# Patient Record
Sex: FEMALE | Race: ASIAN | NOT HISPANIC OR LATINO | URBAN - METROPOLITAN AREA
[De-identification: names, ages, dates, MRNs, and addresses within clinical notes are randomized per-mention and may not be internally consistent; named-entity substitution may affect disease eponyms.]

---

## 2018-03-01 ENCOUNTER — OUTPATIENT (OUTPATIENT)
Dept: OUTPATIENT SERVICES | Facility: HOSPITAL | Age: 83
LOS: 1 days | End: 2018-03-01
Payer: MEDICAID

## 2018-03-01 DIAGNOSIS — Z90.89 ACQUIRED ABSENCE OF OTHER ORGANS: Chronic | ICD-10-CM

## 2018-03-01 PROCEDURE — G9001: CPT

## 2018-03-09 ENCOUNTER — INPATIENT (INPATIENT)
Facility: HOSPITAL | Age: 83
LOS: 7 days | Discharge: HOME | DRG: 689 | End: 2018-03-17
Attending: HOSPITALIST | Admitting: SURGERY
Payer: MEDICARE

## 2018-03-09 VITALS
HEIGHT: 58 IN | SYSTOLIC BLOOD PRESSURE: 198 MMHG | HEART RATE: 87 BPM | OXYGEN SATURATION: 98 % | TEMPERATURE: 101 F | RESPIRATION RATE: 18 BRPM | DIASTOLIC BLOOD PRESSURE: 70 MMHG | WEIGHT: 134.92 LBS

## 2018-03-09 DIAGNOSIS — Z90.89 ACQUIRED ABSENCE OF OTHER ORGANS: Chronic | ICD-10-CM

## 2018-03-09 LAB
ALBUMIN SERPL ELPH-MCNC: 3.7 G/DL — SIGNIFICANT CHANGE UP (ref 3.3–5.2)
ALP SERPL-CCNC: 111 U/L — SIGNIFICANT CHANGE UP (ref 40–120)
ALT FLD-CCNC: 11 U/L — SIGNIFICANT CHANGE UP
ANION GAP SERPL CALC-SCNC: 13 MMOL/L — SIGNIFICANT CHANGE UP (ref 5–17)
APPEARANCE UR: CLEAR — SIGNIFICANT CHANGE UP
APTT BLD: 34.2 SEC — SIGNIFICANT CHANGE UP (ref 27.5–37.4)
AST SERPL-CCNC: 21 U/L — SIGNIFICANT CHANGE UP
BACTERIA # UR AUTO: ABNORMAL
BASOPHILS # BLD AUTO: 0 K/UL — SIGNIFICANT CHANGE UP (ref 0–0.2)
BASOPHILS NFR BLD AUTO: 0.5 % — SIGNIFICANT CHANGE UP (ref 0–2)
BILIRUB SERPL-MCNC: 0.2 MG/DL — LOW (ref 0.4–2)
BILIRUB UR-MCNC: NEGATIVE — SIGNIFICANT CHANGE UP
BUN SERPL-MCNC: 25 MG/DL — HIGH (ref 8–20)
CALCIUM SERPL-MCNC: 9.3 MG/DL — SIGNIFICANT CHANGE UP (ref 8.6–10.2)
CHLORIDE SERPL-SCNC: 100 MMOL/L — SIGNIFICANT CHANGE UP (ref 98–107)
CO2 SERPL-SCNC: 28 MMOL/L — SIGNIFICANT CHANGE UP (ref 22–29)
COLOR SPEC: YELLOW — SIGNIFICANT CHANGE UP
CREAT SERPL-MCNC: 1 MG/DL — SIGNIFICANT CHANGE UP (ref 0.5–1.3)
DIFF PNL FLD: ABNORMAL
EOSINOPHIL # BLD AUTO: 0 K/UL — SIGNIFICANT CHANGE UP (ref 0–0.5)
EOSINOPHIL NFR BLD AUTO: 0.6 % — SIGNIFICANT CHANGE UP (ref 0–6)
EPI CELLS # UR: ABNORMAL
GLUCOSE SERPL-MCNC: 171 MG/DL — HIGH (ref 70–115)
GLUCOSE UR QL: NEGATIVE MG/DL — SIGNIFICANT CHANGE UP
HCT VFR BLD CALC: 36 % — LOW (ref 37–47)
HGB BLD-MCNC: 11.4 G/DL — LOW (ref 12–16)
INR BLD: 1.03 RATIO — SIGNIFICANT CHANGE UP (ref 0.88–1.16)
KETONES UR-MCNC: NEGATIVE — SIGNIFICANT CHANGE UP
LEUKOCYTE ESTERASE UR-ACNC: ABNORMAL
LYMPHOCYTES # BLD AUTO: 1.2 K/UL — SIGNIFICANT CHANGE UP (ref 1–4.8)
LYMPHOCYTES # BLD AUTO: 19.7 % — LOW (ref 20–55)
MCHC RBC-ENTMCNC: 27.1 PG — SIGNIFICANT CHANGE UP (ref 27–31)
MCHC RBC-ENTMCNC: 31.7 G/DL — LOW (ref 32–36)
MCV RBC AUTO: 85.7 FL — SIGNIFICANT CHANGE UP (ref 81–99)
MONOCYTES # BLD AUTO: 0.7 K/UL — SIGNIFICANT CHANGE UP (ref 0–0.8)
MONOCYTES NFR BLD AUTO: 10.6 % — HIGH (ref 3–10)
NEUTROPHILS # BLD AUTO: 4.3 K/UL — SIGNIFICANT CHANGE UP (ref 1.8–8)
NEUTROPHILS NFR BLD AUTO: 68.6 % — SIGNIFICANT CHANGE UP (ref 37–73)
NITRITE UR-MCNC: POSITIVE
PH UR: 6 — SIGNIFICANT CHANGE UP (ref 5–8)
PLATELET # BLD AUTO: 274 K/UL — SIGNIFICANT CHANGE UP (ref 150–400)
POTASSIUM SERPL-MCNC: 4.7 MMOL/L — SIGNIFICANT CHANGE UP (ref 3.5–5.3)
POTASSIUM SERPL-SCNC: 4.7 MMOL/L — SIGNIFICANT CHANGE UP (ref 3.5–5.3)
PROT SERPL-MCNC: 7.1 G/DL — SIGNIFICANT CHANGE UP (ref 6.6–8.7)
PROT UR-MCNC: 100 MG/DL
PROTHROM AB SERPL-ACNC: 11.3 SEC — SIGNIFICANT CHANGE UP (ref 9.8–12.7)
RBC # BLD: 4.2 M/UL — LOW (ref 4.4–5.2)
RBC # FLD: 13.3 % — SIGNIFICANT CHANGE UP (ref 11–15.6)
RBC CASTS # UR COMP ASSIST: ABNORMAL /HPF (ref 0–4)
SODIUM SERPL-SCNC: 141 MMOL/L — SIGNIFICANT CHANGE UP (ref 135–145)
SP GR SPEC: 1.02 — SIGNIFICANT CHANGE UP (ref 1.01–1.02)
UROBILINOGEN FLD QL: NEGATIVE MG/DL — SIGNIFICANT CHANGE UP
WBC # BLD: 6.3 K/UL — SIGNIFICANT CHANGE UP (ref 4.8–10.8)
WBC # FLD AUTO: 6.3 K/UL — SIGNIFICANT CHANGE UP (ref 4.8–10.8)
WBC UR QL: ABNORMAL

## 2018-03-09 PROCEDURE — 93010 ELECTROCARDIOGRAM REPORT: CPT

## 2018-03-09 PROCEDURE — 71045 X-RAY EXAM CHEST 1 VIEW: CPT | Mod: 26

## 2018-03-09 PROCEDURE — 99285 EMERGENCY DEPT VISIT HI MDM: CPT

## 2018-03-09 PROCEDURE — 70450 CT HEAD/BRAIN W/O DYE: CPT | Mod: 26

## 2018-03-09 RX ORDER — SODIUM CHLORIDE 9 MG/ML
3 INJECTION INTRAMUSCULAR; INTRAVENOUS; SUBCUTANEOUS ONCE
Qty: 0 | Refills: 0 | Status: COMPLETED | OUTPATIENT
Start: 2018-03-09 | End: 2018-03-09

## 2018-03-09 RX ORDER — CEFTRIAXONE 500 MG/1
1 INJECTION, POWDER, FOR SOLUTION INTRAMUSCULAR; INTRAVENOUS ONCE
Qty: 0 | Refills: 0 | Status: DISCONTINUED | OUTPATIENT
Start: 2018-03-09 | End: 2018-03-09

## 2018-03-09 RX ORDER — VANCOMYCIN HCL 1 G
1000 VIAL (EA) INTRAVENOUS ONCE
Qty: 0 | Refills: 0 | Status: COMPLETED | OUTPATIENT
Start: 2018-03-09 | End: 2018-03-09

## 2018-03-09 RX ORDER — PIPERACILLIN AND TAZOBACTAM 4; .5 G/20ML; G/20ML
3.38 INJECTION, POWDER, LYOPHILIZED, FOR SOLUTION INTRAVENOUS ONCE
Qty: 0 | Refills: 0 | Status: COMPLETED | OUTPATIENT
Start: 2018-03-09 | End: 2018-03-09

## 2018-03-09 RX ORDER — LABETALOL HCL 100 MG
10 TABLET ORAL ONCE
Qty: 0 | Refills: 0 | Status: COMPLETED | OUTPATIENT
Start: 2018-03-09 | End: 2018-03-09

## 2018-03-09 RX ADMIN — PIPERACILLIN AND TAZOBACTAM 200 GRAM(S): 4; .5 INJECTION, POWDER, LYOPHILIZED, FOR SOLUTION INTRAVENOUS at 17:00

## 2018-03-09 RX ADMIN — SODIUM CHLORIDE 3 MILLILITER(S): 9 INJECTION INTRAMUSCULAR; INTRAVENOUS; SUBCUTANEOUS at 17:06

## 2018-03-09 RX ADMIN — Medication 250 MILLIGRAM(S): at 17:00

## 2018-03-09 RX ADMIN — Medication 1 MILLIGRAM(S): at 22:03

## 2018-03-09 NOTE — ED PROVIDER NOTE - OBJECTIVE STATEMENT
AS PER EMS CAME FROM DIALYSIS WITH AMS AS PER TRIAGE. PT NOT VERBAL UPON ARRIVAL AND NO FAMILY IS WITH HER. PT FOUND TO HAVE FEVER AND CODE SEPSIS CALLED   MED HXAtrial fibrillation    CVA (cerebral vascular accident)    Diabetes    High cholesterol    Hypertension

## 2018-03-09 NOTE — ED ADULT NURSE REASSESSMENT NOTE - NS ED NURSE REASSESS COMMENT FT1
Pt. received at 1930, awake, nonverbal, moves all extremities but does not follow commands. Family at bedside, states pt. does not receive Dialysis as EMS reported. Son Jack ([phone]) at bedside to provide Hx. Pt was home and not acting self, states pt. is normally A&Ox3 at baseline and walks without help. as per family pt. is altered, pt. pending CT of head.

## 2018-03-09 NOTE — ED ADULT NURSE NOTE - PMH
Atrial fibrillation    CVA (cerebral vascular accident)    Diabetes    High cholesterol    Hypertension

## 2018-03-09 NOTE — ED PROVIDER NOTE - SHIFT CHANGE DETAILS
PT WITH AMS AND FEVER RECTAL 101.3 IN RESUS ALTHOUGH NOT DOCUMENTED. EVAL SIG UTI  AWAITING CT RESULT FOR ADMIT  RECVD AB  DR WOODARD PCP

## 2018-03-09 NOTE — ED ADULT NURSE NOTE - OBJECTIVE STATEMENT
Patient found laying in stretcher, awake, alert, altered mental status, breathing unlabored.  Patient sent from dialysis due to increased altered mental status.  responsive to pain.

## 2018-03-09 NOTE — ED PROVIDER NOTE - PROGRESS NOTE DETAILS
surgery informed me that patient is to be admitted to SICU under Dr. Cunningham for subdural hematoma. Also confirmed with surgery that they know about the UTI; surgery will treat UTI.

## 2018-03-09 NOTE — ED PROVIDER NOTE - ENMT, MLM
Airway patent,  . Mouth with normal mucosa. Throat has no vesicles, no oropharyngeal exudates and uvula is midline.

## 2018-03-09 NOTE — ED ADULT TRIAGE NOTE - CHIEF COMPLAINT QUOTE
BIBA, patient is awake and not providing any hx, as per ems, patient was sent for eval of ams, patient has a hx of ams post dialysis, no family present for further hx

## 2018-03-10 DIAGNOSIS — N30.00 ACUTE CYSTITIS WITHOUT HEMATURIA: ICD-10-CM

## 2018-03-10 DIAGNOSIS — I10 ESSENTIAL (PRIMARY) HYPERTENSION: ICD-10-CM

## 2018-03-10 DIAGNOSIS — I63.513 CEREBRAL INFARCTION DUE TO UNSPECIFIED OCCLUSION OR STENOSIS OF BILATERAL MIDDLE CEREBRAL ARTERIES: ICD-10-CM

## 2018-03-10 DIAGNOSIS — E44.0 MODERATE PROTEIN-CALORIE MALNUTRITION: ICD-10-CM

## 2018-03-10 DIAGNOSIS — I62.00 NONTRAUMATIC SUBDURAL HEMORRHAGE, UNSPECIFIED: ICD-10-CM

## 2018-03-10 DIAGNOSIS — E11.9 TYPE 2 DIABETES MELLITUS WITHOUT COMPLICATIONS: ICD-10-CM

## 2018-03-10 DIAGNOSIS — N39.0 URINARY TRACT INFECTION, SITE NOT SPECIFIED: ICD-10-CM

## 2018-03-10 LAB
ANION GAP SERPL CALC-SCNC: 10 MMOL/L — SIGNIFICANT CHANGE UP (ref 5–17)
BASOPHILS # BLD AUTO: 0 K/UL — SIGNIFICANT CHANGE UP (ref 0–0.2)
BASOPHILS NFR BLD AUTO: 0.6 % — SIGNIFICANT CHANGE UP (ref 0–2)
BUN SERPL-MCNC: 21 MG/DL — HIGH (ref 8–20)
CALCIUM SERPL-MCNC: 8.7 MG/DL — SIGNIFICANT CHANGE UP (ref 8.6–10.2)
CHLORIDE SERPL-SCNC: 100 MMOL/L — SIGNIFICANT CHANGE UP (ref 98–107)
CO2 SERPL-SCNC: 29 MMOL/L — SIGNIFICANT CHANGE UP (ref 22–29)
CREAT SERPL-MCNC: 0.91 MG/DL — SIGNIFICANT CHANGE UP (ref 0.5–1.3)
EOSINOPHIL # BLD AUTO: 0.1 K/UL — SIGNIFICANT CHANGE UP (ref 0–0.5)
EOSINOPHIL NFR BLD AUTO: 1.9 % — SIGNIFICANT CHANGE UP (ref 0–6)
GLUCOSE BLDC GLUCOMTR-MCNC: 75 MG/DL — SIGNIFICANT CHANGE UP (ref 70–99)
GLUCOSE BLDC GLUCOMTR-MCNC: 84 MG/DL — SIGNIFICANT CHANGE UP (ref 70–99)
GLUCOSE BLDC GLUCOMTR-MCNC: 86 MG/DL — SIGNIFICANT CHANGE UP (ref 70–99)
GLUCOSE BLDC GLUCOMTR-MCNC: 96 MG/DL — SIGNIFICANT CHANGE UP (ref 70–99)
GLUCOSE BLDC GLUCOMTR-MCNC: 97 MG/DL — SIGNIFICANT CHANGE UP (ref 70–99)
GLUCOSE SERPL-MCNC: 86 MG/DL — SIGNIFICANT CHANGE UP (ref 70–115)
HBA1C BLD-MCNC: 5.7 % — HIGH (ref 4–5.6)
HCT VFR BLD CALC: 34 % — LOW (ref 37–47)
HGB BLD-MCNC: 11 G/DL — LOW (ref 12–16)
LYMPHOCYTES # BLD AUTO: 1.5 K/UL — SIGNIFICANT CHANGE UP (ref 1–4.8)
LYMPHOCYTES # BLD AUTO: 29.1 % — SIGNIFICANT CHANGE UP (ref 20–55)
MAGNESIUM SERPL-MCNC: 2 MG/DL — SIGNIFICANT CHANGE UP (ref 1.6–2.6)
MCHC RBC-ENTMCNC: 27 PG — SIGNIFICANT CHANGE UP (ref 27–31)
MCHC RBC-ENTMCNC: 32.4 G/DL — SIGNIFICANT CHANGE UP (ref 32–36)
MCV RBC AUTO: 83.3 FL — SIGNIFICANT CHANGE UP (ref 81–99)
MONOCYTES # BLD AUTO: 0.6 K/UL — SIGNIFICANT CHANGE UP (ref 0–0.8)
MONOCYTES NFR BLD AUTO: 10.9 % — HIGH (ref 3–10)
NEUTROPHILS # BLD AUTO: 3 K/UL — SIGNIFICANT CHANGE UP (ref 1.8–8)
NEUTROPHILS NFR BLD AUTO: 57.1 % — SIGNIFICANT CHANGE UP (ref 37–73)
PHOSPHATE SERPL-MCNC: 3.5 MG/DL — SIGNIFICANT CHANGE UP (ref 2.4–4.7)
PLATELET # BLD AUTO: 248 K/UL — SIGNIFICANT CHANGE UP (ref 150–400)
POTASSIUM SERPL-MCNC: 4.3 MMOL/L — SIGNIFICANT CHANGE UP (ref 3.5–5.3)
POTASSIUM SERPL-SCNC: 4.3 MMOL/L — SIGNIFICANT CHANGE UP (ref 3.5–5.3)
RBC # BLD: 4.08 M/UL — LOW (ref 4.4–5.2)
RBC # FLD: 13.2 % — SIGNIFICANT CHANGE UP (ref 11–15.6)
SODIUM SERPL-SCNC: 139 MMOL/L — SIGNIFICANT CHANGE UP (ref 135–145)
T3 SERPL-MCNC: 82 NG/DL — SIGNIFICANT CHANGE UP (ref 80–200)
T4 AB SER-ACNC: 6.2 UG/DL — SIGNIFICANT CHANGE UP (ref 4.5–12)
TSH SERPL-MCNC: 3 UIU/ML — SIGNIFICANT CHANGE UP (ref 0.27–4.2)
WBC # BLD: 5.2 K/UL — SIGNIFICANT CHANGE UP (ref 4.8–10.8)
WBC # FLD AUTO: 5.2 K/UL — SIGNIFICANT CHANGE UP (ref 4.8–10.8)

## 2018-03-10 PROCEDURE — 99291 CRITICAL CARE FIRST HOUR: CPT

## 2018-03-10 PROCEDURE — 70450 CT HEAD/BRAIN W/O DYE: CPT | Mod: 26

## 2018-03-10 PROCEDURE — 99222 1ST HOSP IP/OBS MODERATE 55: CPT | Mod: AI

## 2018-03-10 RX ORDER — ACETAMINOPHEN 500 MG
1000 TABLET ORAL ONCE
Qty: 0 | Refills: 0 | Status: COMPLETED | OUTPATIENT
Start: 2018-03-10 | End: 2018-03-10

## 2018-03-10 RX ORDER — PIPERACILLIN AND TAZOBACTAM 4; .5 G/20ML; G/20ML
3.38 INJECTION, POWDER, LYOPHILIZED, FOR SOLUTION INTRAVENOUS EVERY 8 HOURS
Qty: 0 | Refills: 0 | Status: DISCONTINUED | OUTPATIENT
Start: 2018-03-10 | End: 2018-03-11

## 2018-03-10 RX ORDER — SODIUM CHLORIDE 9 MG/ML
1000 INJECTION, SOLUTION INTRAVENOUS
Qty: 0 | Refills: 0 | Status: DISCONTINUED | OUTPATIENT
Start: 2018-03-10 | End: 2018-03-16

## 2018-03-10 RX ORDER — SODIUM CHLORIDE 9 MG/ML
1000 INJECTION INTRAMUSCULAR; INTRAVENOUS; SUBCUTANEOUS
Qty: 0 | Refills: 0 | Status: DISCONTINUED | OUTPATIENT
Start: 2018-03-10 | End: 2018-03-10

## 2018-03-10 RX ORDER — HYDRALAZINE HCL 50 MG
5 TABLET ORAL ONCE
Qty: 0 | Refills: 0 | Status: COMPLETED | OUTPATIENT
Start: 2018-03-10 | End: 2018-03-10

## 2018-03-10 RX ORDER — PIPERACILLIN AND TAZOBACTAM 4; .5 G/20ML; G/20ML
3.38 INJECTION, POWDER, LYOPHILIZED, FOR SOLUTION INTRAVENOUS ONCE
Qty: 0 | Refills: 0 | Status: COMPLETED | OUTPATIENT
Start: 2018-03-10 | End: 2018-03-10

## 2018-03-10 RX ORDER — ONDANSETRON 8 MG/1
4 TABLET, FILM COATED ORAL EVERY 6 HOURS
Qty: 0 | Refills: 0 | Status: DISCONTINUED | OUTPATIENT
Start: 2018-03-10 | End: 2018-03-17

## 2018-03-10 RX ORDER — INSULIN LISPRO 100/ML
VIAL (ML) SUBCUTANEOUS EVERY 4 HOURS
Qty: 0 | Refills: 0 | Status: DISCONTINUED | OUTPATIENT
Start: 2018-03-10 | End: 2018-03-11

## 2018-03-10 RX ADMIN — PIPERACILLIN AND TAZOBACTAM 200 GRAM(S): 4; .5 INJECTION, POWDER, LYOPHILIZED, FOR SOLUTION INTRAVENOUS at 11:31

## 2018-03-10 RX ADMIN — SODIUM CHLORIDE 75 MILLILITER(S): 9 INJECTION, SOLUTION INTRAVENOUS at 11:31

## 2018-03-10 RX ADMIN — SODIUM CHLORIDE 75 MILLILITER(S): 9 INJECTION INTRAMUSCULAR; INTRAVENOUS; SUBCUTANEOUS at 06:07

## 2018-03-10 RX ADMIN — PIPERACILLIN AND TAZOBACTAM 25 GRAM(S): 4; .5 INJECTION, POWDER, LYOPHILIZED, FOR SOLUTION INTRAVENOUS at 18:08

## 2018-03-10 RX ADMIN — Medication 1.25 MILLIGRAM(S): at 23:22

## 2018-03-10 RX ADMIN — Medication 1.25 MILLIGRAM(S): at 06:07

## 2018-03-10 RX ADMIN — Medication 400 MILLIGRAM(S): at 14:50

## 2018-03-10 RX ADMIN — Medication 1.25 MILLIGRAM(S): at 18:08

## 2018-03-10 RX ADMIN — Medication 1000 MILLIGRAM(S): at 15:00

## 2018-03-10 RX ADMIN — Medication 5 MILLIGRAM(S): at 23:34

## 2018-03-10 RX ADMIN — Medication 1.25 MILLIGRAM(S): at 12:53

## 2018-03-10 NOTE — H&P ADULT - NEUROLOGICAL COMMENTS
Non-verbal, No facial droop noted. Opens eyes to pain. Mumbles a few inappropriate words. Not able to follow simple commands. Moves all extremities spontaneously, Localizes to pain in all extremities.

## 2018-03-10 NOTE — H&P ADULT - HISTORY OF PRESENT ILLNESS
88F with PMH Afib, CVA, DM, benign heart murmur presents to ED with "Altered mental status" and "fever". Per her son, the patient became "non-verbal this AM". Per son, the patient does have dementia.  She is ambulatory with assistance.  She can usually speak and say her name/.  She was not able to do that this morning, and that was when her family became concerned.  Per the son, the patient also fell and hit her head about 2 weeks ago. Since her fall she had been in her usual state of health until today. Her appetite is unchanged. No nausea or vomiting as per son. She had been complaining of headache since her fall.  They did not seek any medical attention for the fall. The patient is prone to urinary tract infections. Pt currently takes ASA 81mg and last dose was yesterday 3/8/18.      Patient is lying comfortably in bed. She appears to be lethargic and non-verbal. Able to open her eyes to pain, but unable to sustain eye opening for more than a few seconds. Not able to follow simple commands. AOx0. She is moving all extremities spontaneously. Localizes to pain in all extremities.

## 2018-03-10 NOTE — CONSULT NOTE ADULT - ATTENDING COMMENTS
NSGY Attg:    see above  CT reviewed  unlikely for bilateral subdural collections to account for acute altered MS  will follow  supportive care per ICU

## 2018-03-10 NOTE — H&P ADULT - ASSESSMENT
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

## 2018-03-10 NOTE — ED ADULT NURSE REASSESSMENT NOTE - NS ED NURSE REASSESS COMMENT FT1
SICU informed of pt. bed, questioned about pt. BP. ED MD Acuña states pt. does not need to be treated for current pressure. ok for transport to SICU

## 2018-03-10 NOTE — PROGRESS NOTE ADULT - SUBJECTIVE AND OBJECTIVE BOX
INTERVAL HPI/OVERNIGHT EVENTS/SUBJECTIVE:  Admitted to ICU For SDH. Patient has dementia at baseline but not acting like herself per family. Upon arrival to ICU following simple commands and opening eye to verbal. Best GCS 13. Given Vanc/zosyn in ED for code sepsis.       ICU Vital Signs Last 24 Hrs  T(C): 36.8 (10 Mar 2018 04:00), Max: 38.3 (09 Mar 2018 16:22)  T(F): 98.3 (10 Mar 2018 04:00), Max: 100.9 (09 Mar 2018 16:22)  HR: 64 (10 Mar 2018 03:53) (63 - 87)  BP: 134/87 (10 Mar 2018 03:53) (134/87 - 198/70)  BP(mean): 101 (10 Mar 2018 03:53) (101 - 101)  ABP: --  ABP(mean): --  RR: 17 (10 Mar 2018 03:53) (16 - 18)  SpO2: 100% (10 Mar 2018 03:53) (98% - 100%)      I&O's Detail            MEDICATIONS  (STANDING):  insulin lispro (HumaLOG) corrective regimen sliding scale   SubCutaneous every 4 hours  sodium chloride 0.9%. 1000 milliLiter(s) (75 mL/Hr) IV Continuous <Continuous>    MEDICATIONS  (PRN):  ondansetron Injectable 4 milliGRAM(s) IV Push every 6 hours PRN Nausea      NUTRITION/IVF: NPO NS @  75    MISC:     PHYSICAL EXAM:    Gen:lethargic but arousable     Eyes: pupils reactive     Neurological: gcs 13     ENMT: no issues     Neck:full painfree rom     Pulmonary: cta bilat     Cardiovascular: NSR     Gastrointestinal:soft nd nttp     Genitourinary:voiding     Back: nttp     Extremities: nttp, moving all     Skin:intact               LABS:  CBC Full  -  ( 09 Mar 2018 17:01 )  WBC Count : 6.3 K/uL  Hemoglobin : 11.4 g/dL  Hematocrit : 36.0 %  Platelet Count - Automated : 274 K/uL  Mean Cell Volume : 85.7 fl  Mean Cell Hemoglobin : 27.1 pg  Mean Cell Hemoglobin Concentration : 31.7 g/dL  Auto Neutrophil # : 4.3 K/uL  Auto Lymphocyte # : 1.2 K/uL  Auto Monocyte # : 0.7 K/uL  Auto Eosinophil # : 0.0 K/uL  Auto Basophil # : 0.0 K/uL  Auto Neutrophil % : 68.6 %  Auto Lymphocyte % : 19.7 %  Auto Monocyte % : 10.6 %  Auto Eosinophil % : 0.6 %  Auto Basophil % : 0.5 %        141  |  100  |  25.0<H>  ----------------------------<  171<H>  4.7   |  28.0  |  1.00    Ca    9.3      09 Mar 2018 17:01    TPro  7.1  /  Alb  3.7  /  TBili  0.2<L>  /  DBili  x   /  AST  21  /  ALT  11  /  AlkPhos  111  03-09    PT/INR - ( 09 Mar 2018 17:01 )   PT: 11.3 sec;   INR: 1.03 ratio         PTT - ( 09 Mar 2018 17:01 )  PTT:34.2 sec  Urinalysis Basic - ( 09 Mar 2018 17:03 )    Color: Yellow / Appearance: Clear / S.020 / pH: x  Gluc: x / Ketone: Negative  / Bili: Negative / Urobili: Negative mg/dL   Blood: x / Protein: 100 mg/dL / Nitrite: Positive   Leuk Esterase: Trace / RBC: 3-5 /HPF / WBC 6-10   Sq Epi: x / Non Sq Epi: Moderate / Bacteria: Many      RECENT CULTURES:      LIVER FUNCTIONS - ( 09 Mar 2018 17:01 )  Alb: 3.7 g/dL / Pro: 7.1 g/dL / ALK PHOS: 111 U/L / ALT: 11 U/L / AST: 21 U/L / GGT: x               CAPILLARY BLOOD GLUCOSE      RADIOLOGY & ADDITIONAL STUDIES:    ASSESSMENT/PLAN:  88yFemale presenting with SDH and + UA    Neuro: neurochecks q1 hr, no narcotic use, sleep hygiene repeat head CT at 0530    CV: monitor hemodynamics, on BP meds at home, may need to convert to IV     Pulm: no issues, O2 only as needed to maintain stats >92%     GI/Nutrition:NPO, possible bedside swallow eval after repreat head CT     /Renal: voiding, monitor I & Os, target Na     ID: given vanc/zosyn for code sespis in ED. No documented fever leukocytosis or tachycardia. Treating +UA and h/o UTIs    Lines/Tubes: PIV     Endo: FS 24, type2 DM on metformin held for now, corrective ISS ordered if needed     Skin: no issues     Proph:  scd, no pharm ppx   Dispo: SICU       CRITICAL CARE TIME SPENT:

## 2018-03-10 NOTE — ED ADULT NURSE REASSESSMENT NOTE - NS ED NURSE REASSESS COMMENT FT1
CT results discussed with MD Brand. Pt. remains altered, sleeping but opens eyes to verbal stimulation. Vital signs remain stable, pt. moves all extremities but does not follow commands. will continue to monitor and maintain safety. CT results discussed with MD Brand. Pt. remains altered, sleeping but opens eyes to verbal stimulation. unable to obtain accurate NIH scale due to pt. inability to follow commands. Vital signs remain stable, pt. moves all extremities but does not follow commands. will continue to monitor and maintain safety.

## 2018-03-10 NOTE — CONSULT NOTE ADULT - ASSESSMENT
ASSESSMENT/PLAN:    88F with PMH Afib, CVA, DM, benign heart murmur presents to ED with "Altered mental status" and "fever". 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 No associated midline shift. Small focal more acute appearing right frontal convexity subdural hemorrhage measuring up to 5 mm in depth".   There is no acute neurosurgical intervention indicated at this time.      s/w her son Jack Zhang (health care proxy) by phone. Per her son, "they do not want any surgical intervention at this time for the patient".      -Discussed case and treatment plan with Dr. Jose Martin Disla. The CT Scan images were reviewed by Dr. Jose Martin Disla.   -Recommend admit to Medicine/ICU for further evaluation & management  -Continue neurochecks  -HOLD anticoagulation/antiplatelet therapy  -SBP control (GOAL <140)  -HOB to 30 degrees  -Repeat CT Scan in 6 hours with low threshold to repeat scan sooner if there are any neurological changes in the patient's condition  -Defer the treatment and management of patient's co-morbid medical conditions to the medicine team (i.e. UTI, infectious, metabolic, etc.)    -Discussed all treatment recommendations above with Dr. South Brand. ASSESSMENT/PLAN:    88F with PMH Afib, CVA, DM, benign heart murmur presents to ED with "Altered mental status" and "fever". 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 No associated midline shift. Small focal more acute appearing right frontal convexity subdural hemorrhage measuring up to 5 mm in depth".   There is no acute neurosurgical intervention indicated at this time.      s/w her son Jack Zhang (health care proxy) by phone. Per her son, "they do not want any surgical intervention at this time for the patient".      -Discussed case and treatment plan with Dr. Jose Martin Disla. The CT Scan images were reviewed by Dr. Jose Martin Disla.   -Recommend admit to Medicine/ICU for further evaluation & management  -Continue neurochecks  -HOLD anticoagulation/antiplatelet therapy  -SBP control (GOAL <140)  -HOB to 30 degrees  -Repeat CT Scan in 6 hours with low threshold to repeat scan sooner if there are any neurological changes in the patient's condition  -Defer the treatment and management of patient's co-morbid medical conditions to the medicine/ICU team (i.e. UTI, infectious, metabolic, etc.)    -Discussed all treatment recommendations above with Dr. South Brand.

## 2018-03-10 NOTE — H&P ADULT - ATTENDING COMMENTS
Agree with above.  The patient is an 88 year old female who fell about 2 weeks ago. The patient was not evaluated at the time of the fall, but was brought in to the ER last night after the patient's son noted a change in her mental status becoming more somnolent and withdrawn.  The patient on exam is a GCS 10, no overt signs of traumatic injury, HEENT NC/AT EOMI, pupils reactive to light, no cervical tenderness, trachea midline, chest bilateral air entry, abdomen is soft non tender, extremities grossly without deformity.  Head CT scan reveals a SDH.  The patient is to be admitted to the SICU for neuro checks, neurosurgery consultation, and repeat head CT scan. The patient will benefit from antibiotic therapy for UTI.

## 2018-03-10 NOTE — PROGRESS NOTE ADULT - ATTENDING COMMENTS
by appearance she moves all four extremtieis but doesn't speak; not sure re command of english. Her CT shows a number of findings that are chronic: bilateral cerebral infarctions and chronic hygromas in addition to more acute appearing right frontal SDH with NO mass affect. I'm not sure how to account for an acute behavioral change given the apparent chronicity of these findings, all existing labs are ok, will check TFT's....double back on medication changes she might have gotten at home...? by appearance she moves all four extremtieis but doesn't speak; not sure re command of english. Her CT shows a number of findings that are chronic: bilateral cerebral infarctions and chronic hygromas in addition to more acute appearing right frontal SDH with NO mass affect. I'm not sure how to account for an acute behavioral change given the apparent chronicity of these findings, she does have a positive urine culture though reportedly this is chronic, all existing labs are ok, will check TFT's....double back on medication changes she might have gotten at home...? Failing a different dx urosepsis might account for her altered mental status so we will treat and await C / S from the urine cx, check on bloods.

## 2018-03-10 NOTE — CONSULT NOTE ADULT - SUBJECTIVE AND OBJECTIVE BOX
HPI:  Source:  ED Physician Dr. Dawkins and phone call with son (Jack Zhang)  Patient is non-verbal.     88F with PMH Afib, CVA, DM, benign heart murmur presents to ED with "Altered mental status" and "fever". Per her son, the patient became "non-verbal this AM" and they decided to call 911 around 4pm to take her to the emergency room.  Per son, the patient does have "dementia".  She is ambulatory with assistance.  She can usually speak and say her "name/".  She was not able to do that this morning, and that was when her family became concerned.  Per the son, the patient also fell and hit her head about 2 weeks ago.  They did not seek any medical attention for the fall. The patient is prone to urinary tract infections. Pt currently takes ASA 81mg and last dose was yesterday 3/8/18.      Patient is lying comfortably in bed. She appears to be lethargic and non-verbal. Able to open her eyes to voice, but unable to sustain eye opening for more than a few seconds. Not able to follow simple commands. AOx0. She is moving all extremities spontaneously. Localizes to pain in all extremities.     PAST MEDICAL & SURGICAL HISTORY:  CVA (cerebral vascular accident)  Diabetes  Atrial fibrillation  High cholesterol  Hypertension  Delivered by  section  S/P tonsillectomy    REVIEW OF SYSTEMS: Unable to obtain a ROS from the patient    Allergies:  No Known Drug Allergies  Shellfish (Rash)    MEDICATIONS  (STANDING): Unable to obtain a list of home medications from patient/son.     SOCIAL HISTORY: Unknown ETOH/Cigarette smoking history.  FAMILY HISTORY:  No pertinent family history in first degree relatives    Vital Signs Last 24 Hrs  T(C): 36.8 (09 Mar 2018 19:30), Max: 38.3 (09 Mar 2018 16:22)  T(F): 98.3 (09 Mar 2018 19:30), Max: 100.9 (09 Mar 2018 16:22)  HR: 63 (10 Mar 2018 01:01) (63 - 87)  BP: 149/66 (10 Mar 2018 01:01) (149/66 - 198/70)  BP(mean): --  RR: 16 (10 Mar 2018 01:01) (16 - 18)  SpO2: 100% (10 Mar 2018 01:01) (98% - 100%)    PHYSICAL EXAM:  GENERAL: Appears lethargic. NAD. Non-verbal.  HEAD:  Atraumatic, Normocephalic  EYES: EOMI, PERRLA, conjunctiva and sclera clear, Unable to assess for visual fields.   ENMT: No tonsillar erythema, exudates, or enlargement; Moist mucous membranes, Good dentition, No lesions  NECK: Supple, No JVD, Normal thyroid  NERVOUS SYSTEM:  Alert & Oriented X 0, Non-verbal, No facial droop noted. Opens eyes to voice, but unable to sustain eye opening for more than a few seconds. Mumbles a few inappropriate words. Not able to follow simple commands. Moves all extremities spontaneously, Localizes to pain in all extremities.   CHEST/LUNG: Clear to percussion bilaterally; No rales, rhonchi, wheezing, or rubs  HEART: Regular rate and rhythm; No murmurs, rubs, or gallops  ABDOMEN: Soft, Nontender, Nondistended; Bowel sounds present  EXTREMITIES:  2+ Peripheral Pulses, No clubbing, cyanosis, or edema  LYMPH: No lymphadenopathy noted  SKIN: No rashes or lesions    LABS:                        11.4   6.3   )-----------( 274      ( 09 Mar 2018 17:01 )             36.0     03-09    141  |  100  |  25.0<H>  ----------------------------<  171<H>  4.7   |  28.0  |  1.00    Ca    9.3      09 Mar 2018 17:01    TPro  7.1  /  Alb  3.7  /  TBili  0.2<L>  /  DBili  x   /  AST  21  /  ALT  11  /  AlkPhos  111  03-09    PT/INR - ( 09 Mar 2018 17:01 )   PT: 11.3 sec;   INR: 1.03 ratio         PTT - ( 09 Mar 2018 17:01 )  PTT:34.2 sec  Urinalysis Basic - ( 09 Mar 2018 17:03 )    Color: Yellow / Appearance: Clear / S.020 / pH: x  Gluc: x / Ketone: Negative  / Bili: Negative / Urobili: Negative mg/dL   Blood: x / Protein: 100 mg/dL / Nitrite: Positive   Leuk Esterase: Trace / RBC: 3-5 /HPF / WBC 6-10   Sq Epi: x / Non Sq Epi: Moderate / Bacteria: Many      RADIOLOGY & ADDITIONAL STUDIES:  ~~~~~~~~~~~~~~~~~~~~~~~~~~~~~~  from: CT Head No Cont (18 @ 23:53) >  EXAM:  CT BRAIN                          PROCEDURE DATE:  2018    INTERPRETATION:  CLINICAL HISTORY:  Altered mental status. Fever and UTI.  TECHNIQUE:  CT of the head without contrast.  Contiguous transaxial images of the head were acquired from the skull   base to the vertex without the administration of iodinated contrast.   Coronal and sagittal reformatted images are provided.     COMPARISON: MRI of the brain and CT abdomen 9/3/2016     FINDINGS:  There is motion artifact present which degrades image quality.    There is a mixed attenuation, predominantly isodense to hypodense to   adjacent brain parenchyma, left frontal, parietal and temporal convexity   subdural collection measuring up to 8 mm in depth. Additional focal  smaller more hyperdense subdural collection is seen along the right   frontal convexity measuring 5 mm. There is no acute parenchymal   hemorrhage, mass effect from vasogenic edema or evidence for large   vascular territory infarct. There is a chronic right parietal temporal   lobes infarct as well as small bilateral basal ganglia infarcts. There   are extensive chronic microvascular changes.    There is cerebral volume loss with secondary proportional prominence of   ventricles and sulci. There is no midline shift.    The calvarium is intact.  There are no osteoblastic or lytic calvarial or   skull base lesions.  The paranasal sinuses and mastoid air cells are   clear.    IMPRESSION:  Subacute on chronic left frontoparietal temporal convexity subdural   hemorrhage measuring up to 8 mm in maximal depth. No associated midline   shift. Small focal more acute appearing right frontal convexity subdural   hemorrhage measuring up to 5 mm in depth.     No acute parenchymal hemorrhage or evidence for large acute vascular   territory infarct.  Chronic infarcts and extensive microvascular changes   as above.    I discussed the findings with Dr. Brand at 12:29 AM on 3/10/2018 with   read back verification.      DEDE GARCIA, RADIOLOGIST  This document has been electronically signed. Mar 10 2018 12:29AM    < end of copied text > HPI:  Source:  ED Physician Dr. Dawkins and phone call with son (Jack Zhang)  Patient is non-verbal.     88F with PMH Afib, CVA, DM, benign heart murmur presents to ED with "Altered mental status" and "fever". Per her son, the patient became "non-verbal this AM" and they decided to call 911 around 4pm to take her to the emergency room.  Per son, the patient does have "dementia".  She is ambulatory with assistance.  She can usually speak and say her "name/".  She was not able to do that this morning, and that was when her family became concerned.  Per the son, the patient also fell and hit her head about 2 weeks ago.  They did not seek any medical attention for the fall. The patient is prone to urinary tract infections. Pt currently takes ASA 81mg and last dose was yesterday 3/8/18.      Patient is lying comfortably in bed. She appears to be lethargic and non-verbal. Able to open her eyes to voice, but unable to sustain eye opening for more than a few seconds. Not able to follow simple commands. AOx0. She is moving all extremities spontaneously. Localizes to pain in all extremities.     PAST MEDICAL & SURGICAL HISTORY:  CVA (cerebral vascular accident)  Diabetes  Atrial fibrillation  High cholesterol  Hypertension  Delivered by  section  S/P tonsillectomy    REVIEW OF SYSTEMS: Unable to obtain a ROS from the patient    Allergies:  No Known Drug Allergies  Shellfish (Rash)    MEDICATIONS  (STANDING):   acetaminophen 325 mg oral tablet: 2 tab(s) orally every 6 hours, As needed, Mild Pain (1 - 3)  · 	enalapril 5 mg oral tablet: 1 tab(s) orally once a day  · 	aspirin:  orally   · 	diltiazem 180 mg/24 hours oral tablet, extended release: 1 tab(s) orally once a day  · 	pravastatin 20 mg oral tablet: 1 tab(s) orally once a day (at bedtime)  · 	metFORMIN 500 mg oral tablet: 1 tab(s) orally     SOCIAL HISTORY: Unknown ETOH/Cigarette smoking history.  FAMILY HISTORY:  No pertinent family history in first degree relatives    Vital Signs Last 24 Hrs  T(C): 36.8 (09 Mar 2018 19:30), Max: 38.3 (09 Mar 2018 16:22)  T(F): 98.3 (09 Mar 2018 19:30), Max: 100.9 (09 Mar 2018 16:22)  HR: 63 (10 Mar 2018 01:01) (63 - 87)  BP: 149/66 (10 Mar 2018 01:01) (149/66 - 198/70)  BP(mean): --  RR: 16 (10 Mar 2018 01:01) (16 - 18)  SpO2: 100% (10 Mar 2018 01:) (98% - 100%)    PHYSICAL EXAM:  GENERAL: Appears lethargic. NAD. Non-verbal.  HEAD:  Atraumatic, Normocephalic  EYES: EOMI, PERRLA, conjunctiva and sclera clear, Unable to assess for visual fields.   ENMT: No tonsillar erythema, exudates, or enlargement; Moist mucous membranes, Good dentition, No lesions  NECK: Supple, No JVD, Normal thyroid  NERVOUS SYSTEM:  Alert & Oriented X 0, Non-verbal, No facial droop noted. Opens eyes to voice, but unable to sustain eye opening for more than a few seconds. Mumbles a few inappropriate words. Not able to follow simple commands. Moves all extremities spontaneously, Localizes to pain in all extremities.   CHEST/LUNG: Clear to percussion bilaterally; No rales, rhonchi, wheezing, or rubs  HEART: Regular rate and rhythm; No murmurs, rubs, or gallops  ABDOMEN: Soft, Nontender, Nondistended; Bowel sounds present  EXTREMITIES:  2+ Peripheral Pulses, No clubbing, cyanosis, or edema  LYMPH: No lymphadenopathy noted  SKIN: No rashes or lesions    LABS:                        11.4   6.3   )-----------( 274      ( 09 Mar 2018 17:01 )             36.0     03-    141  |  100  |  25.0<H>  ----------------------------<  171<H>  4.7   |  28.0  |  1.00    Ca    9.3      09 Mar 2018 17:01    TPro  7.1  /  Alb  3.7  /  TBili  0.2<L>  /  DBili  x   /  AST  21  /  ALT  11  /  AlkPhos  111  03-09    PT/INR - ( 09 Mar 2018 17:01 )   PT: 11.3 sec;   INR: 1.03 ratio         PTT - ( 09 Mar 2018 17:01 )  PTT:34.2 sec  Urinalysis Basic - ( 09 Mar 2018 17:03 )    Color: Yellow / Appearance: Clear / S.020 / pH: x  Gluc: x / Ketone: Negative  / Bili: Negative / Urobili: Negative mg/dL   Blood: x / Protein: 100 mg/dL / Nitrite: Positive   Leuk Esterase: Trace / RBC: 3-5 /HPF / WBC 6-10   Sq Epi: x / Non Sq Epi: Moderate / Bacteria: Many      RADIOLOGY & ADDITIONAL STUDIES:  ~~~~~~~~~~~~~~~~~~~~~~~~~~~~~~  from: CT Head No Cont (18 @ 23:53) >  EXAM:  CT BRAIN                          PROCEDURE DATE:  2018    INTERPRETATION:  CLINICAL HISTORY:  Altered mental status. Fever and UTI.  TECHNIQUE:  CT of the head without contrast.  Contiguous transaxial images of the head were acquired from the skull   base to the vertex without the administration of iodinated contrast.   Coronal and sagittal reformatted images are provided.     COMPARISON: MRI of the brain and CT abdomen 9/3/2016     FINDINGS:  There is motion artifact present which degrades image quality.    There is a mixed attenuation, predominantly isodense to hypodense to   adjacent brain parenchyma, left frontal, parietal and temporal convexity   subdural collection measuring up to 8 mm in depth. Additional focal  smaller more hyperdense subdural collection is seen along the right   frontal convexity measuring 5 mm. There is no acute parenchymal   hemorrhage, mass effect from vasogenic edema or evidence for large   vascular territory infarct. There is a chronic right parietal temporal   lobes infarct as well as small bilateral basal ganglia infarcts. There   are extensive chronic microvascular changes.    There is cerebral volume loss with secondary proportional prominence of   ventricles and sulci. There is no midline shift.    The calvarium is intact.  There are no osteoblastic or lytic calvarial or   skull base lesions.  The paranasal sinuses and mastoid air cells are   clear.    IMPRESSION:  Subacute on chronic left frontoparietal temporal convexity subdural   hemorrhage measuring up to 8 mm in maximal depth. No associated midline   shift. Small focal more acute appearing right frontal convexity subdural   hemorrhage measuring up to 5 mm in depth.     No acute parenchymal hemorrhage or evidence for large acute vascular   territory infarct.  Chronic infarcts and extensive microvascular changes   as above.    I discussed the findings with Dr. Brand at 12:29 AM on 3/10/2018 with   read back verification.      DEDE GARCIA, RADIOLOGIST  This document has been electronically signed. Mar 10 2018 12:29AM    < end of copied text >

## 2018-03-11 LAB
-  AMIKACIN: SIGNIFICANT CHANGE UP
-  AMPICILLIN/SULBACTAM: SIGNIFICANT CHANGE UP
-  AMPICILLIN: SIGNIFICANT CHANGE UP
-  AZTREONAM: SIGNIFICANT CHANGE UP
-  CEFAZOLIN: SIGNIFICANT CHANGE UP
-  CEFEPIME: SIGNIFICANT CHANGE UP
-  CEFOXITIN: SIGNIFICANT CHANGE UP
-  CEFTAZIDIME: SIGNIFICANT CHANGE UP
-  CEFTRIAXONE: SIGNIFICANT CHANGE UP
-  CIPROFLOXACIN: SIGNIFICANT CHANGE UP
-  ERTAPENEM: SIGNIFICANT CHANGE UP
-  GENTAMICIN: SIGNIFICANT CHANGE UP
-  IMIPENEM: SIGNIFICANT CHANGE UP
-  LEVOFLOXACIN: SIGNIFICANT CHANGE UP
-  MEROPENEM: SIGNIFICANT CHANGE UP
-  NITROFURANTOIN: SIGNIFICANT CHANGE UP
-  PIPERACILLIN/TAZOBACTAM: SIGNIFICANT CHANGE UP
-  TOBRAMYCIN: SIGNIFICANT CHANGE UP
-  TRIMETHOPRIM/SULFAMETHOXAZOLE: SIGNIFICANT CHANGE UP
ANION GAP SERPL CALC-SCNC: 13 MMOL/L — SIGNIFICANT CHANGE UP (ref 5–17)
BASOPHILS # BLD AUTO: 0 K/UL — SIGNIFICANT CHANGE UP (ref 0–0.2)
BASOPHILS NFR BLD AUTO: 0.7 % — SIGNIFICANT CHANGE UP (ref 0–2)
BUN SERPL-MCNC: 16 MG/DL — SIGNIFICANT CHANGE UP (ref 8–20)
CALCIUM SERPL-MCNC: 9.1 MG/DL — SIGNIFICANT CHANGE UP (ref 8.6–10.2)
CHLORIDE SERPL-SCNC: 102 MMOL/L — SIGNIFICANT CHANGE UP (ref 98–107)
CO2 SERPL-SCNC: 25 MMOL/L — SIGNIFICANT CHANGE UP (ref 22–29)
CREAT SERPL-MCNC: 1.03 MG/DL — SIGNIFICANT CHANGE UP (ref 0.5–1.3)
CULTURE RESULTS: SIGNIFICANT CHANGE UP
EOSINOPHIL # BLD AUTO: 0.2 K/UL — SIGNIFICANT CHANGE UP (ref 0–0.5)
EOSINOPHIL NFR BLD AUTO: 2.8 % — SIGNIFICANT CHANGE UP (ref 0–6)
GLUCOSE BLDC GLUCOMTR-MCNC: 101 MG/DL — HIGH (ref 70–99)
GLUCOSE BLDC GLUCOMTR-MCNC: 110 MG/DL — HIGH (ref 70–99)
GLUCOSE BLDC GLUCOMTR-MCNC: 99 MG/DL — SIGNIFICANT CHANGE UP (ref 70–99)
GLUCOSE SERPL-MCNC: 111 MG/DL — SIGNIFICANT CHANGE UP (ref 70–115)
HCT VFR BLD CALC: 34.8 % — LOW (ref 37–47)
HGB BLD-MCNC: 11.1 G/DL — LOW (ref 12–16)
LYMPHOCYTES # BLD AUTO: 1.6 K/UL — SIGNIFICANT CHANGE UP (ref 1–4.8)
LYMPHOCYTES # BLD AUTO: 27.5 % — SIGNIFICANT CHANGE UP (ref 20–55)
MAGNESIUM SERPL-MCNC: 2.1 MG/DL — SIGNIFICANT CHANGE UP (ref 1.6–2.6)
MCHC RBC-ENTMCNC: 27.1 PG — SIGNIFICANT CHANGE UP (ref 27–31)
MCHC RBC-ENTMCNC: 31.9 G/DL — LOW (ref 32–36)
MCV RBC AUTO: 85.1 FL — SIGNIFICANT CHANGE UP (ref 81–99)
METHOD TYPE: SIGNIFICANT CHANGE UP
MONOCYTES # BLD AUTO: 0.6 K/UL — SIGNIFICANT CHANGE UP (ref 0–0.8)
MONOCYTES NFR BLD AUTO: 9.2 % — SIGNIFICANT CHANGE UP (ref 3–10)
NEUTROPHILS # BLD AUTO: 3.6 K/UL — SIGNIFICANT CHANGE UP (ref 1.8–8)
NEUTROPHILS NFR BLD AUTO: 59.6 % — SIGNIFICANT CHANGE UP (ref 37–73)
ORGANISM # SPEC MICROSCOPIC CNT: SIGNIFICANT CHANGE UP
ORGANISM # SPEC MICROSCOPIC CNT: SIGNIFICANT CHANGE UP
OVALOCYTES BLD QL SMEAR: SLIGHT — SIGNIFICANT CHANGE UP
PHOSPHATE SERPL-MCNC: 3.4 MG/DL — SIGNIFICANT CHANGE UP (ref 2.4–4.7)
PLAT MORPH BLD: NORMAL — SIGNIFICANT CHANGE UP
PLATELET # BLD AUTO: 271 K/UL — SIGNIFICANT CHANGE UP (ref 150–400)
POIKILOCYTOSIS BLD QL AUTO: SLIGHT — SIGNIFICANT CHANGE UP
POTASSIUM SERPL-MCNC: 4.5 MMOL/L — SIGNIFICANT CHANGE UP (ref 3.5–5.3)
POTASSIUM SERPL-SCNC: 4.5 MMOL/L — SIGNIFICANT CHANGE UP (ref 3.5–5.3)
RBC # BLD: 4.09 M/UL — LOW (ref 4.4–5.2)
RBC # FLD: 13.5 % — SIGNIFICANT CHANGE UP (ref 11–15.6)
RBC BLD AUTO: ABNORMAL
SODIUM SERPL-SCNC: 140 MMOL/L — SIGNIFICANT CHANGE UP (ref 135–145)
SPECIMEN SOURCE: SIGNIFICANT CHANGE UP
WBC # BLD: 6 K/UL — SIGNIFICANT CHANGE UP (ref 4.8–10.8)
WBC # FLD AUTO: 6 K/UL — SIGNIFICANT CHANGE UP (ref 4.8–10.8)

## 2018-03-11 PROCEDURE — 99233 SBSQ HOSP IP/OBS HIGH 50: CPT

## 2018-03-11 RX ORDER — CEFTRIAXONE 500 MG/1
1 INJECTION, POWDER, FOR SOLUTION INTRAMUSCULAR; INTRAVENOUS EVERY 24 HOURS
Qty: 0 | Refills: 0 | Status: DISCONTINUED | OUTPATIENT
Start: 2018-03-12 | End: 2018-03-12

## 2018-03-11 RX ORDER — ACETAMINOPHEN 500 MG
650 TABLET ORAL EVERY 6 HOURS
Qty: 0 | Refills: 0 | Status: DISCONTINUED | OUTPATIENT
Start: 2018-03-11 | End: 2018-03-12

## 2018-03-11 RX ORDER — CEFAZOLIN SODIUM 1 G
1000 VIAL (EA) INJECTION EVERY 8 HOURS
Qty: 0 | Refills: 0 | Status: DISCONTINUED | OUTPATIENT
Start: 2018-03-11 | End: 2018-03-11

## 2018-03-11 RX ORDER — CEFTRIAXONE 500 MG/1
1 INJECTION, POWDER, FOR SOLUTION INTRAMUSCULAR; INTRAVENOUS ONCE
Qty: 0 | Refills: 0 | Status: COMPLETED | OUTPATIENT
Start: 2018-03-11 | End: 2018-03-11

## 2018-03-11 RX ORDER — CEFTRIAXONE 500 MG/1
INJECTION, POWDER, FOR SOLUTION INTRAMUSCULAR; INTRAVENOUS
Qty: 0 | Refills: 0 | Status: DISCONTINUED | OUTPATIENT
Start: 2018-03-11 | End: 2018-03-12

## 2018-03-11 RX ADMIN — PIPERACILLIN AND TAZOBACTAM 25 GRAM(S): 4; .5 INJECTION, POWDER, LYOPHILIZED, FOR SOLUTION INTRAVENOUS at 01:29

## 2018-03-11 RX ADMIN — SODIUM CHLORIDE 75 MILLILITER(S): 9 INJECTION, SOLUTION INTRAVENOUS at 01:28

## 2018-03-11 RX ADMIN — CEFTRIAXONE 100 GRAM(S): 500 INJECTION, POWDER, FOR SOLUTION INTRAMUSCULAR; INTRAVENOUS at 10:28

## 2018-03-11 RX ADMIN — Medication 1.25 MILLIGRAM(S): at 17:16

## 2018-03-11 RX ADMIN — Medication 1.25 MILLIGRAM(S): at 12:30

## 2018-03-11 RX ADMIN — Medication 1.25 MILLIGRAM(S): at 23:35

## 2018-03-11 RX ADMIN — Medication 1.25 MILLIGRAM(S): at 06:29

## 2018-03-11 NOTE — SWALLOW BEDSIDE ASSESSMENT ADULT - SLP GENERAL OBSERVATIONS
Easily aroused but lethargic, decreased cognition, decreased verbal output, Speaks Tagali but reportedly understand some basic English

## 2018-03-11 NOTE — PROGRESS NOTE ADULT - ASSESSMENT
1) toxic metabolic encephalopathy --> likley secondary to uti  --> c.w iv abx  --> supportive care  --> npo for now    2) SDH --> neurosurgery consult appreciated  --> repeat ct performed    3) htn --> iv meds    4) dvt prop --> scds

## 2018-03-11 NOTE — PROGRESS NOTE ADULT - SUBJECTIVE AND OBJECTIVE BOX
SIMON BRISENO    [id number]    88y      Female    INTERVAL HPI/OVERNIGHT EVENTS:    patient being seen for SDH, uti and med management. Patient seen at bedside and is awake but non verbal.       REVIEW OF SYSTEMS:    unable to obtain secondary to mental status    Vital Signs Last 24 Hrs  T(C): 37.1 (11 Mar 2018 12:00), Max: 37.7 (11 Mar 2018 08:00)  T(F): 98.7 (11 Mar 2018 12:00), Max: 99.8 (11 Mar 2018 08:00)  HR: 71 (11 Mar 2018 12:00) (57 - 87)  BP: 148/64 (11 Mar 2018 12:00) (99/64 - 200/79)  BP(mean): 92 (11 Mar 2018 12:00) (73 - 114)  RR: 21 (11 Mar 2018 12:00) (14 - 39)  SpO2: 100% (11 Mar 2018 12:00) (96% - 100%)    PHYSICAL EXAM:    GENERAL: NAD,   HEENT: PERRL,   NECK: soft,    CHEST/LUNG: Clear to auscultation bilaterally; No wheezing  HEART: S1S2+, Regular rate and rhythm; No murmurs  ABDOMEN: Soft, Nontender,   EXTREMITIES:  2+ Peripheral Pulses, No edema  SKIN: No rashes or lesions  NEURO: Awake, slight facial droop      LABS:                        11.1   6.0   )-----------( 271      ( 11 Mar 2018 06:37 )             34.8     03-11    140  |  102  |  16.0  ----------------------------<  111  4.5   |  25.0  |  1.03    Ca    9.1      11 Mar 2018 06:37  Phos  3.4     03-11  Mg     2.1     03-11    TPro  7.1  /  Alb  3.7  /  TBili  0.2<L>  /  DBili  x   /  AST  21  /  ALT  11  /  AlkPhos  111  03-09    PT/INR - ( 09 Mar 2018 17:01 )   PT: 11.3 sec;   INR: 1.03 ratio         PTT - ( 09 Mar 2018 17:01 )  PTT:34.2 sec  Urinalysis Basic - ( 09 Mar 2018 17:03 )    Color: Yellow / Appearance: Clear / S.020 / pH: x  Gluc: x / Ketone: Negative  / Bili: Negative / Urobili: Negative mg/dL   Blood: x / Protein: 100 mg/dL / Nitrite: Positive   Leuk Esterase: Trace / RBC: 3-5 /HPF / WBC 6-10   Sq Epi: x / Non Sq Epi: Moderate / Bacteria: Many      MEDICATIONS  (STANDING):  cefTRIAXone   IVPB      dextrose 5% + lactated ringers. 1000 milliLiter(s) (75 mL/Hr) IV Continuous <Continuous>  enalaprilat Injectable 1.25 milliGRAM(s) IV Push every 6 hours  insulin lispro (HumaLOG) corrective regimen sliding scale   SubCutaneous every 4 hours    MEDICATIONS  (PRN):  acetaminophen  Suppository. 650 milliGRAM(s) Rectal every 6 hours PRN fever or pain  ondansetron Injectable 4 milliGRAM(s) IV Push every 6 hours PRN Nausea      RADIOLOGY & ADDITIONAL TESTS:

## 2018-03-11 NOTE — SWALLOW BEDSIDE ASSESSMENT ADULT - SLP PERTINENT HISTORY OF CURRENT PROBLEM
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

## 2018-03-11 NOTE — CHART NOTE - NSCHARTNOTEFT_GEN_A_CORE
Pt no longer has need for SICU care. Pt's continued issue is with UTI. Dr. King has agreed to assume care for the patient once on the floors. He has been signed out the patient's hospital course and has no further questions.

## 2018-03-11 NOTE — PROGRESS NOTE ADULT - SUBJECTIVE AND OBJECTIVE BOX
INTERVAL HPI/OVERNIGHT EVENTS/SUBJECTIVE: given 5mg hydralazine x1 for , good response.     ICU Vital Signs Last 24 Hrs  T(C): 36.8 (10 Mar 2018 20:00), Max: 37 (10 Mar 2018 16:00)  T(F): 98.3 (10 Mar 2018 20:00), Max: 98.6 (10 Mar 2018 16:00)  HR: 87 (11 Mar 2018 01:00) (57 - 87)  BP: 141/64 (11 Mar 2018 01:00) (128/63 - 200/79)  BP(mean): 92 (11 Mar 2018 01:) (85 - 114)  ABP: --  ABP(mean): --  RR: 23 (11 Mar 2018 01:00) (14 - 39)  SpO2: 99% (11 Mar 2018 01:) (80% - 100%)      I&O's Detail    09 Mar 2018 07:01  -  10 Mar 2018 07:00  --------------------------------------------------------  IN:    sodium chloride 0.9%: 225 mL  Total IN: 225 mL    OUT:  Total OUT: 0 mL    Total NET: 225 mL      10 Mar 2018 06:01  -  11 Mar 2018 06:23  --------------------------------------------------------  IN:    dextrose 5% + lactated ringers.: 1050 mL    sodium chloride 0.9%: 225 mL    Solution: 100 mL    Solution: 225 mL  Total IN: 1600 mL    OUT:  Total OUT: 0 mL    Total NET: 1600 mL                MEDICATIONS  (STANDING):  dextrose 5% + lactated ringers. 1000 milliLiter(s) (75 mL/Hr) IV Continuous <Continuous>  enalaprilat Injectable 1.25 milliGRAM(s) IV Push every 6 hours  insulin lispro (HumaLOG) corrective regimen sliding scale   SubCutaneous every 4 hours  piperacillin/tazobactam IVPB. 3.375 Gram(s) IV Intermittent every 8 hours    MEDICATIONS  (PRN):  ondansetron Injectable 4 milliGRAM(s) IV Push every 6 hours PRN Nausea      NUTRITION/IVF: NPO, d5 lr @ 75     MISC:     PHYSICAL EXAM:    Gen:more easily arousable     Eyes: pupils reactive     Neurological: gcs 14     ENMT: no issues     Neck:full painfree rom     Pulmonary: cta bilat     Cardiovascular: NSR     Gastrointestinal:soft nd nttp     Genitourinary:voiding     Back: nttp     Extremities: nttp, moving all     Skin:intact             LABS:  CBC Full  -  ( 10 Mar 2018 05:25 )  WBC Count : 5.2 K/uL  Hemoglobin : 11.0 g/dL  Hematocrit : 34.0 %  Platelet Count - Automated : 248 K/uL  Mean Cell Volume : 83.3 fl  Mean Cell Hemoglobin : 27.0 pg  Mean Cell Hemoglobin Concentration : 32.4 g/dL  Auto Neutrophil # : 3.0 K/uL  Auto Lymphocyte # : 1.5 K/uL  Auto Monocyte # : 0.6 K/uL  Auto Eosinophil # : 0.1 K/uL  Auto Basophil # : 0.0 K/uL  Auto Neutrophil % : 57.1 %  Auto Lymphocyte % : 29.1 %  Auto Monocyte % : 10.9 %  Auto Eosinophil % : 1.9 %  Auto Basophil % : 0.6 %    03-10    139  |  100  |  21.0<H>  ----------------------------<  86  4.3   |  29.0  |  0.91    Ca    8.7      10 Mar 2018 05:25  Phos  3.5     03-10  Mg     2.0     03-10    TPro  7.1  /  Alb  3.7  /  TBili  0.2<L>  /  DBili  x   /  AST  21  /  ALT  11  /  AlkPhos  111  03-09    PT/INR - ( 09 Mar 2018 17:01 )   PT: 11.3 sec;   INR: 1.03 ratio         PTT - ( 09 Mar 2018 17:01 )  PTT:34.2 sec  Urinalysis Basic - ( 09 Mar 2018 17:03 )    Color: Yellow / Appearance: Clear / S.020 / pH: x  Gluc: x / Ketone: Negative  / Bili: Negative / Urobili: Negative mg/dL   Blood: x / Protein: 100 mg/dL / Nitrite: Positive   Leuk Esterase: Trace / RBC: 3-5 /HPF / WBC 6-10   Sq Epi: x / Non Sq Epi: Moderate / Bacteria: Many      RECENT CULTURES:  - .Urine Catheterized XXXX XXXX   >100,000 CFU/ml Gram Negative Rods Identification and susceptibility to  follow.  Culture in progress        LIVER FUNCTIONS - ( 09 Mar 2018 17:01 )  Alb: 3.7 g/dL / Pro: 7.1 g/dL / ALK PHOS: 111 U/L / ALT: 11 U/L / AST: 21 U/L / GGT: x               CAPILLARY BLOOD GLUCOSE      RADIOLOGY & ADDITIONAL STUDIES:    ASSESSMENT/PLAN:        88yFemale presenting with SDH and + UA    Neuro: neurochecks q4 hr, no narcotic use, sleep hygiene, safety checks     CV: monitor hemodynamics, cont vasotec, add hydralazine as needed     Pulm: no issues, O2 only as needed to maintain stats >92%     GI/Nutrition:NPO, possible bedside swallow eval, SS consult placed     /Renal: voiding, monitor I & Os, target Na     ID: due to +UA  and ams cont with zosyn. f/u UCx and de escalate abx as appropriate     Lines/Tubes: PIV     Endo: FS q6, type2 DM on metformin held for now, corrective ISS ordered if needed     Skin: no issues       dispo: to monitored floor bed   CRITICAL CARE TIME SPENT:

## 2018-03-12 LAB
CHOLEST SERPL-MCNC: 205 MG/DL — HIGH (ref 110–199)
HDLC SERPL-MCNC: 44 MG/DL — LOW
LIPID PNL WITH DIRECT LDL SERPL: 135 MG/DL — SIGNIFICANT CHANGE UP
TOTAL CHOLESTEROL/HDL RATIO MEASUREMENT: 5 RATIO — SIGNIFICANT CHANGE UP (ref 3.3–7.1)
TRIGL SERPL-MCNC: 128 MG/DL — SIGNIFICANT CHANGE UP (ref 10–200)
VIT B12 SERPL-MCNC: 297 PG/ML — SIGNIFICANT CHANGE UP (ref 180–914)

## 2018-03-12 PROCEDURE — 99222 1ST HOSP IP/OBS MODERATE 55: CPT

## 2018-03-12 PROCEDURE — 99233 SBSQ HOSP IP/OBS HIGH 50: CPT

## 2018-03-12 RX ORDER — PREGABALIN 225 MG/1
1000 CAPSULE ORAL ONCE
Qty: 0 | Refills: 0 | Status: COMPLETED | OUTPATIENT
Start: 2018-03-12 | End: 2018-03-12

## 2018-03-12 RX ORDER — AMANTADINE HCL 100 MG
100 CAPSULE ORAL
Qty: 0 | Refills: 0 | Status: DISCONTINUED | OUTPATIENT
Start: 2018-03-12 | End: 2018-03-12

## 2018-03-12 RX ORDER — LANOLIN ALCOHOL/MO/W.PET/CERES
3 CREAM (GRAM) TOPICAL AT BEDTIME
Qty: 0 | Refills: 0 | Status: DISCONTINUED | OUTPATIENT
Start: 2018-03-12 | End: 2018-03-17

## 2018-03-12 RX ORDER — ACETAMINOPHEN 500 MG
650 TABLET ORAL EVERY 6 HOURS
Qty: 0 | Refills: 0 | Status: DISCONTINUED | OUTPATIENT
Start: 2018-03-12 | End: 2018-03-17

## 2018-03-12 RX ORDER — FLUOXETINE HCL 10 MG
10 CAPSULE ORAL DAILY
Qty: 0 | Refills: 0 | Status: DISCONTINUED | OUTPATIENT
Start: 2018-03-12 | End: 2018-03-17

## 2018-03-12 RX ORDER — AMANTADINE HCL 100 MG
100 CAPSULE ORAL
Qty: 0 | Refills: 0 | Status: DISCONTINUED | OUTPATIENT
Start: 2018-03-12 | End: 2018-03-17

## 2018-03-12 RX ADMIN — CEFTRIAXONE 100 GRAM(S): 500 INJECTION, POWDER, FOR SOLUTION INTRAMUSCULAR; INTRAVENOUS at 11:50

## 2018-03-12 RX ADMIN — SODIUM CHLORIDE 75 MILLILITER(S): 9 INJECTION, SOLUTION INTRAVENOUS at 08:35

## 2018-03-12 RX ADMIN — SODIUM CHLORIDE 75 MILLILITER(S): 9 INJECTION, SOLUTION INTRAVENOUS at 21:28

## 2018-03-12 RX ADMIN — Medication 1.25 MILLIGRAM(S): at 05:23

## 2018-03-12 RX ADMIN — Medication 650 MILLIGRAM(S): at 02:08

## 2018-03-12 RX ADMIN — Medication 650 MILLIGRAM(S): at 02:52

## 2018-03-12 RX ADMIN — Medication 2.5 MILLIGRAM(S): at 17:26

## 2018-03-12 RX ADMIN — Medication 2.5 MILLIGRAM(S): at 11:50

## 2018-03-12 RX ADMIN — PREGABALIN 1000 MICROGRAM(S): 225 CAPSULE ORAL at 11:50

## 2018-03-12 NOTE — PHYSICAL THERAPY INITIAL EVALUATION ADULT - CRITERIA FOR SKILLED THERAPEUTIC INTERVENTIONS
functional limitations in following categories/risk reduction/prevention/anticipated discharge recommendation/rehab potential/therapy frequency/anticipated equipment needs at discharge/impairments found/predicted duration of therapy intervention

## 2018-03-12 NOTE — PROGRESS NOTE ADULT - ASSESSMENT
1) toxic metabolic encephalopathy --> likley secondary to uti  --> c.w iv abx  --> supportive care  --> npo for now, speech following    2) SDH --> neurosurgery consult appreciated  --> repeat ct performed and was stable     3) htn --> iv meds increased to 2.5mg    4) dvt prop --> scds    5) drowsiness --> pmr consult appreciated will add meds

## 2018-03-12 NOTE — PROGRESS NOTE ADULT - SUBJECTIVE AND OBJECTIVE BOX
SIMON BRISENO    [id number]    88y      Female    INTERVAL HPI/OVERNIGHT EVENTS:    REVIEW OF SYSTEMS:    CONSTITUTIONAL: No fever, weight loss, or fatigue  RESPIRATORY: No cough, wheezing, hemoptysis; No shortness of breath  CARDIOVASCULAR: No chest pain, palpitations  GASTROINTESTINAL: No abdominal or epigastric pain. No nausea, vomiting  NEUROLOGICAL: No headaches, memory loss, loss of strength.  MISCELLANEOUS:      Vital Signs Last 24 Hrs  T(C): 37.4 (12 Mar 2018 10:17), Max: 38.1 (12 Mar 2018 02:00)  T(F): 99.3 (12 Mar 2018 10:17), Max: 100.6 (12 Mar 2018 02:00)  HR: 54 (12 Mar 2018 08:38) (54 - 82)  BP: 150/64 (12 Mar 2018 08:38) (124/64 - 185/84)  BP(mean): 101 (11 Mar 2018 17:00) (86 - 110)  RR: 18 (12 Mar 2018 08:38) (13 - 26)  SpO2: 96% (12 Mar 2018 08:38) (96% - 100%)    PHYSICAL EXAM:    GENERAL: NAD, well-groomed  HEENT: PERRL, +EOMI  NECK: soft, Supple, No JVD,   CHEST/LUNG: Clear to auscultation bilaterally; No wheezing  HEART: S1S2+, Regular rate and rhythm; No murmurs, rubs, or gallops  ABDOMEN: Soft, Nontender, Nondistended; Bowel sounds present  EXTREMITIES:  2+ Peripheral Pulses, No clubbing, cyanosis, or edema  SKIN: No rashes or lesions  NEURO: AAOX3, no focal deficits, no motor r sensory loss  PSYCH: normal mood      LABS:                        11.1   6.0   )-----------( 271      ( 11 Mar 2018 06:37 )             34.8     03-11    140  |  102  |  16.0  ----------------------------<  111  4.5   |  25.0  |  1.03    Ca    9.1      11 Mar 2018 06:37  Phos  3.4     03-11  Mg     2.1     03-11        MEDICATIONS  (STANDING):  cefTRIAXone   IVPB      cefTRIAXone   IVPB 1 Gram(s) IV Intermittent every 24 hours  cyanocobalamin Injectable 1000 MICROGram(s) SubCutaneous once  dextrose 5% + lactated ringers. 1000 milliLiter(s) (75 mL/Hr) IV Continuous <Continuous>  enalaprilat Injectable 2.5 milliGRAM(s) IV Push every 6 hours    MEDICATIONS  (PRN):  acetaminophen  Suppository 650 milliGRAM(s) Rectal every 6 hours PRN For Temp greater than 38 C (100.4 F)  acetaminophen  Suppository. 650 milliGRAM(s) Rectal every 6 hours PRN Mild Pain (1 - 3)  ondansetron Injectable 4 milliGRAM(s) IV Push every 6 hours PRN Nausea      RADIOLOGY & ADDITIONAL TESTS: SIMON BRISENO    [id number]    88y      Female    INTERVAL HPI/OVERNIGHT EVENTS:    patient being seen for uti.     REVIEW OF SYSTEMS:    unable to obtain seocndary to mental status     Vital Signs Last 24 Hrs  T(C): 37.4 (12 Mar 2018 10:17), Max: 38.1 (12 Mar 2018 02:00)  T(F): 99.3 (12 Mar 2018 10:17), Max: 100.6 (12 Mar 2018 02:00)  HR: 54 (12 Mar 2018 08:38) (54 - 82)  BP: 150/64 (12 Mar 2018 08:38) (124/64 - 185/84)  BP(mean): 101 (11 Mar 2018 17:00) (86 - 110)  RR: 18 (12 Mar 2018 08:38) (13 - 26)  SpO2: 96% (12 Mar 2018 08:38) (96% - 100%)    PHYSICAL EXAM:    GENERAL: NAD,   HEENT: PERRL,   NECK: soft,    CHEST/LUNG: Clear to auscultation bilaterally; No wheezing  HEART: S1S2+, Regular rate and rhythm; No murmurs  ABDOMEN: Soft, Nontender,   EXTREMITIES:  2+ Peripheral Pulses, No edema  SKIN: No rashes or lesions  NEURO: Awake, slight facial droop        LABS:                        11.1   6.0   )-----------( 271      ( 11 Mar 2018 06:37 )             34.8     03-11    140  |  102  |  16.0  ----------------------------<  111  4.5   |  25.0  |  1.03    Ca    9.1      11 Mar 2018 06:37  Phos  3.4     03-11  Mg     2.1     03-11        MEDICATIONS  (STANDING):  cefTRIAXone   IVPB      cefTRIAXone   IVPB 1 Gram(s) IV Intermittent every 24 hours  cyanocobalamin Injectable 1000 MICROGram(s) SubCutaneous once  dextrose 5% + lactated ringers. 1000 milliLiter(s) (75 mL/Hr) IV Continuous <Continuous>  enalaprilat Injectable 2.5 milliGRAM(s) IV Push every 6 hours    MEDICATIONS  (PRN):  acetaminophen  Suppository 650 milliGRAM(s) Rectal every 6 hours PRN For Temp greater than 38 C (100.4 F)  acetaminophen  Suppository. 650 milliGRAM(s) Rectal every 6 hours PRN Mild Pain (1 - 3)  ondansetron Injectable 4 milliGRAM(s) IV Push every 6 hours PRN Nausea      RADIOLOGY & ADDITIONAL TESTS:

## 2018-03-12 NOTE — CONSULT NOTE ADULT - SUBJECTIVE AND OBJECTIVE BOX
88F was admitted on 3/10 after a fall 2 weeks prior, now presents with new onset AMS and not being able to speak.     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 No associated midline   shift. Small focal more acute appearing right frontal convexity subdural   hemorrhage measuring up to 5 mm in depth.     A repeat was unchanged.     Patient is NPO.    REVIEW OF SYSTEMS  Constitutional - No fever, No weight loss, No fatigue  HEENT - No eye pain, No visual disturbances, No difficulty hearing, No tinnitus, No vertigo, No neck pain  Respiratory - No cough, No wheezing, No shortness of breath  Cardiovascular - No chest pain, No palpitations  Gastrointestinal - No abdominal pain, No nausea, No vomiting, No diarrhea, No constipation  Genitourinary - No dysuria, No frequency, No hematuria, No incontinence  Neurological - No headaches, No memory loss, No loss of strength, No numbness, No tremors  Skin - No itching, No rashes, No lesions   Endocrine - No temperature intolerance  Musculoskeletal - No joint pain, No joint swelling, No muscle pain  Psychiatric - No depression, No anxiety    VITALS  T(C): 36.9 (18 @ 04:55), Max: 38.1 (18 @ 02:00)  HR: 73 (18 @ 04:55) (66 - 83)  BP: 149/74 (18 @ 04:55) (99/64 - 185/84)  RR: 18 (18 @ 04:55) (13 - 39)  SpO2: 98% (18 @ 18:01) (97% - 100%)  Wt(kg): --    PAST MEDICAL & SURGICAL HISTORY  CVA (cerebral vascular accident)  Diabetes  Atrial fibrillation  High cholesterol  Hypertension  Delivered by  section  S/P tonsillectomy  No significant past surgical history      SOCIAL HISTORY  Smoking - Denied  EtOH - Denied   Drugs - Denied    FUNCTIONAL HISTORY  Lives   Independent    CURRENT FUNCTIONAL STATUS      FAMILY HISTORY   No pertinent family history in first degree relatives      RECENT LABS/IMAGING  CBC Full  -  ( 11 Mar 2018 06:37 )  WBC Count : 6.0 K/uL  Hemoglobin : 11.1 g/dL  Hematocrit : 34.8 %  Platelet Count - Automated : 271 K/uL  Mean Cell Volume : 85.1 fl  Mean Cell Hemoglobin : 27.1 pg  Mean Cell Hemoglobin Concentration : 31.9 g/dL  Auto Neutrophil # : 3.6 K/uL  Auto Lymphocyte # : 1.6 K/uL  Auto Monocyte # : 0.6 K/uL  Auto Eosinophil # : 0.2 K/uL  Auto Basophil # : 0.0 K/uL  Auto Neutrophil % : 59.6 %  Auto Lymphocyte % : 27.5 %  Auto Monocyte % : 9.2 %  Auto Eosinophil % : 2.8 %  Auto Basophil % : 0.7 %    03-11    140  |  102  |  16.0  ----------------------------<  111  4.5   |  25.0  |  1.03    Ca    9.1      11 Mar 2018 06:37  Phos  3.4     -11  Mg     2.1     -11          ALLERGIES  No Known Drug Allergies  Shellfish (Rash)      MEDICATIONS   acetaminophen  Suppository. 650 milliGRAM(s) Rectal every 6 hours PRN  cefTRIAXone   IVPB      cefTRIAXone   IVPB 1 Gram(s) IV Intermittent every 24 hours  dextrose 5% + lactated ringers. 1000 milliLiter(s) IV Continuous <Continuous>  enalaprilat Injectable 1.25 milliGRAM(s) IV Push every 6 hours  ondansetron Injectable 4 milliGRAM(s) IV Push every 6 hours PRN 88F was admitted on 3/10 after a fall 2 weeks prior, now presents with new onset AMS and not being able to speak.     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 No associated midline   shift. Small focal more acute appearing right frontal convexity subdural   hemorrhage measuring up to 5 mm in depth.     A repeat was unchanged. Patient is NPO. Course also complicated by UTI.     Patient is nonverbal, awake, does not paticipate in exam.   Moves to side, and legs with stimulation,    REVIEW OF SYSTEMS  Unable to obtain.     VITALS  T(C): 36.9 (18 @ 04:55), Max: 38.1 (18 @ 02:00)  HR: 73 (18 @ 04:55) (66 - 83)  BP: 149/74 (18 @ 04:55) (99/64 - 185/84)  RR: 18 (18 @ 04:55) (13 - 39)  SpO2: 98% (18 @ 18:01) (97% - 100%)  Wt(kg): --    PAST MEDICAL & SURGICAL HISTORY  CVA (cerebral vascular accident)  Diabetes  Atrial fibrillation  High cholesterol  Hypertension  Delivered by  section  S/P tonsillectomy  No significant past surgical history      SOCIAL HISTORY  Smoking - Denied  EtOH - Denied   Drugs - Denied    CURRENT FUNCTIONAL STATUS  Total A  unclear about previous level of function    FAMILY HISTORY   No pertinent family history in first degree relatives      RECENT LABS/IMAGING  CBC Full  -  ( 11 Mar 2018 06:37 )  WBC Count : 6.0 K/uL  Hemoglobin : 11.1 g/dL  Hematocrit : 34.8 %  Platelet Count - Automated : 271 K/uL  Mean Cell Volume : 85.1 fl  Mean Cell Hemoglobin : 27.1 pg  Mean Cell Hemoglobin Concentration : 31.9 g/dL  Auto Neutrophil # : 3.6 K/uL  Auto Lymphocyte # : 1.6 K/uL  Auto Monocyte # : 0.6 K/uL  Auto Eosinophil # : 0.2 K/uL  Auto Basophil # : 0.0 K/uL  Auto Neutrophil % : 59.6 %  Auto Lymphocyte % : 27.5 %  Auto Monocyte % : 9.2 %  Auto Eosinophil % : 2.8 %  Auto Basophil % : 0.7 %        140  |  102  |  16.0  ----------------------------<  111  4.5   |  25.0  |  1.03    Ca    9.1      11 Mar 2018 06:37  Phos  3.4     03-11  Mg     2.1     03-11          ALLERGIES  No Known Drug Allergies  Shellfish (Rash)      MEDICATIONS   acetaminophen  Suppository. 650 milliGRAM(s) Rectal every 6 hours PRN  cefTRIAXone   IVPB      cefTRIAXone   IVPB 1 Gram(s) IV Intermittent every 24 hours  dextrose 5% + lactated ringers. 1000 milliLiter(s) IV Continuous <Continuous>  enalaprilat Injectable 1.25 milliGRAM(s) IV Push every 6 hours  ondansetron Injectable 4 milliGRAM(s) IV Push every 6 hours PRN      ----------------------------------------------------------------------------------------  PHYSICAL EXAM  Constitutional - NAD, Comfortable  HEENT - NCAT   Neck - Supple, No limited ROM  Chest - Breathing comfortably, No wheezing  Cardiovascular - S1S2   Abdomen - Soft   Extremities - No C/C/E, No calf tenderness   Neurologic Exam -                    Cognitive - Awake, Alert     Communication - Nonverbal     Cranial Nerves - Facial droop     Motor - Unable to assess, grabs rail with left hand  Psychiatric - Affect flat  ----------------------------------------------------------------------------------------  ASSESSMENT/PLAN  88y Female with functional deficits after sustaining a left SDH from a fall  Pain - Tylenol  Diet - NPO, IVF  UTI - Rocephin  DVT PPX - SCDs  TBI - Recommend Prozac 10mg daily, Melatonin 3mg HS, Amantadine 100mg Q6AM/12PM to assist with arousal/executive function  Rehab - Will need better info of prior functional status prior to making recommendations for rehab. If patient was independent prior and the TBI contributed to her current functional status, then ACUTE rehab may be appropriate.

## 2018-03-12 NOTE — PHYSICAL THERAPY INITIAL EVALUATION ADULT - DISCHARGE DISPOSITION, PT EVAL
Subacute-Rehab, further clarification of previous functional level needed for the best discharge deposition, RN, CCC made aware/rehabilitation facility

## 2018-03-12 NOTE — PHYSICAL THERAPY INITIAL EVALUATION ADULT - ADDITIONAL COMMENTS
Pt. none verbal, as per ER and RN pt. leaves in private house with family, ambulation with assistance, further clarification needed for the best discharge deposition, RN, CCC made aware.

## 2018-03-12 NOTE — PHYSICAL THERAPY INITIAL EVALUATION ADULT - IMPAIRMENTS FOUND, PT EVAL
poor safety awareness/posture/neuromotor development and sensory integration/cognitive impairment/muscle strength/ROM

## 2018-03-12 NOTE — PHYSICAL THERAPY INITIAL EVALUATION ADULT - TRANSFER SAFETY CONCERNS NOTED: SIT/STAND, REHAB EVAL
decreased sequencing ability/decreased safety awareness/losing balance/decreased weight-shifting ability

## 2018-03-13 LAB
24R-OH-CALCIDIOL SERPL-MCNC: 25.9 NG/ML — LOW (ref 30–80)
ANION GAP SERPL CALC-SCNC: 12 MMOL/L — SIGNIFICANT CHANGE UP (ref 5–17)
BUN SERPL-MCNC: 10 MG/DL — SIGNIFICANT CHANGE UP (ref 8–20)
CALCIUM SERPL-MCNC: 8.7 MG/DL — SIGNIFICANT CHANGE UP (ref 8.6–10.2)
CHLORIDE SERPL-SCNC: 103 MMOL/L — SIGNIFICANT CHANGE UP (ref 98–107)
CO2 SERPL-SCNC: 24 MMOL/L — SIGNIFICANT CHANGE UP (ref 22–29)
CREAT SERPL-MCNC: 0.86 MG/DL — SIGNIFICANT CHANGE UP (ref 0.5–1.3)
GLUCOSE SERPL-MCNC: 97 MG/DL — SIGNIFICANT CHANGE UP (ref 70–115)
POTASSIUM SERPL-MCNC: 3.8 MMOL/L — SIGNIFICANT CHANGE UP (ref 3.5–5.3)
POTASSIUM SERPL-SCNC: 3.8 MMOL/L — SIGNIFICANT CHANGE UP (ref 3.5–5.3)
SODIUM SERPL-SCNC: 139 MMOL/L — SIGNIFICANT CHANGE UP (ref 135–145)

## 2018-03-13 PROCEDURE — 99233 SBSQ HOSP IP/OBS HIGH 50: CPT

## 2018-03-13 PROCEDURE — 99232 SBSQ HOSP IP/OBS MODERATE 35: CPT

## 2018-03-13 RX ORDER — CHOLECALCIFEROL (VITAMIN D3) 125 MCG
1000 CAPSULE ORAL DAILY
Qty: 0 | Refills: 0 | Status: DISCONTINUED | OUTPATIENT
Start: 2018-03-13 | End: 2018-03-17

## 2018-03-13 RX ADMIN — ONDANSETRON 4 MILLIGRAM(S): 8 TABLET, FILM COATED ORAL at 18:06

## 2018-03-13 RX ADMIN — SODIUM CHLORIDE 75 MILLILITER(S): 9 INJECTION, SOLUTION INTRAVENOUS at 19:56

## 2018-03-13 RX ADMIN — Medication 2.5 MILLIGRAM(S): at 00:13

## 2018-03-13 RX ADMIN — Medication 2.5 MILLIGRAM(S): at 12:53

## 2018-03-13 RX ADMIN — Medication 2.5 MILLIGRAM(S): at 05:59

## 2018-03-13 RX ADMIN — Medication 2.5 MILLIGRAM(S): at 19:56

## 2018-03-13 NOTE — PROGRESS NOTE ADULT - SUBJECTIVE AND OBJECTIVE BOX
SIMON BRISENO    [id number]    88y      Female    INTERVAL HPI/OVERNIGHT EVENTS:    patient being seen for     REVIEW OF SYSTEMS:    CONSTITUTIONAL: No fever, weight loss, or fatigue  RESPIRATORY: No cough, wheezing, hemoptysis; No shortness of breath  CARDIOVASCULAR: No chest pain, palpitations  GASTROINTESTINAL: No abdominal or epigastric pain. No nausea, vomiting  NEUROLOGICAL: No headaches, memory loss, loss of strength.  MISCELLANEOUS:      Vital Signs Last 24 Hrs  T(C): 37.5 (13 Mar 2018 09:00), Max: 37.5 (13 Mar 2018 09:00)  T(F): 99.5 (13 Mar 2018 09:00), Max: 99.5 (13 Mar 2018 09:00)  HR: 80 (13 Mar 2018 09:00) (69 - 80)  BP: 165/78 (13 Mar 2018 06:10) (147/73 - 165/78)  BP(mean): --  RR: 16 (13 Mar 2018 09:00) (16 - 18)  SpO2: 96% (12 Mar 2018 21:30) (96% - 96%)    PHYSICAL EXAM:    GENERAL: NAD, well-groomed  HEENT: PERRL, +EOMI  NECK: soft, Supple, No JVD,   CHEST/LUNG: Clear to auscultation bilaterally; No wheezing  HEART: S1S2+, Regular rate and rhythm; No murmurs, rubs, or gallops  ABDOMEN: Soft, Nontender, Nondistended; Bowel sounds present  EXTREMITIES:  2+ Peripheral Pulses, No clubbing, cyanosis, or edema  SKIN: No rashes or lesions  NEURO: AAOX3, no focal deficits, no motor r sensory loss  PSYCH: normal mood      LABS:    03-13    139  |  103  |  10.0  ----------------------------<  97  3.8   |  24.0  |  0.86    Ca    8.7      13 Mar 2018 06:20        MEDICATIONS  (STANDING):  amantadine 100 milliGRAM(s) Oral <User Schedule>  cholecalciferol 1000 Unit(s) Oral daily  dextrose 5% + lactated ringers. 1000 milliLiter(s) (75 mL/Hr) IV Continuous <Continuous>  enalaprilat Injectable 2.5 milliGRAM(s) IV Push every 6 hours  FLUoxetine 10 milliGRAM(s) Oral daily  melatonin 3 milliGRAM(s) Oral at bedtime    MEDICATIONS  (PRN):  acetaminophen  Suppository 650 milliGRAM(s) Rectal every 6 hours PRN For Temp greater than 38 C (100.4 F)  acetaminophen  Suppository. 650 milliGRAM(s) Rectal every 6 hours PRN Mild Pain (1 - 3)  ondansetron Injectable 4 milliGRAM(s) IV Push every 6 hours PRN Nausea      RADIOLOGY & ADDITIONAL TESTS: SIMON BRISENO    [id number]    88y      Female    INTERVAL HPI/OVERNIGHT EVENTS:    patient being seen for metabolic encephalopathy, uti and med management. Patient seen at bedside and is more awake but still non verbal     REVIEW OF SYSTEMS:    unable to obtain secondary to mental status    Vital Signs Last 24 Hrs  T(C): 37.5 (13 Mar 2018 09:00), Max: 37.5 (13 Mar 2018 09:00)  T(F): 99.5 (13 Mar 2018 09:00), Max: 99.5 (13 Mar 2018 09:00)  HR: 80 (13 Mar 2018 09:00) (69 - 80)  BP: 165/78 (13 Mar 2018 06:10) (147/73 - 165/78)  BP(mean): --  RR: 16 (13 Mar 2018 09:00) (16 - 18)  SpO2: 96% (12 Mar 2018 21:30) (96% - 96%)    PHYSICAL EXAM:    GENERAL: NAD,   NECK: soft,    CHEST/LUNG: Clear to auscultation bilaterally; No wheezing  HEART: S1S2+, Regular rate and rhythm; No murmurs  ABDOMEN: Soft, Nontender,   EXTREMITIES:  2+ Peripheral Pulses, No edema  SKIN: No rashes or lesions  NEURO: slightly more Awake, slight facial droop    LABS:    03-13    139  |  103  |  10.0  ----------------------------<  97  3.8   |  24.0  |  0.86    Ca    8.7      13 Mar 2018 06:20        MEDICATIONS  (STANDING):  amantadine 100 milliGRAM(s) Oral <User Schedule>  cholecalciferol 1000 Unit(s) Oral daily  dextrose 5% + lactated ringers. 1000 milliLiter(s) (75 mL/Hr) IV Continuous <Continuous>  enalaprilat Injectable 2.5 milliGRAM(s) IV Push every 6 hours  FLUoxetine 10 milliGRAM(s) Oral daily  melatonin 3 milliGRAM(s) Oral at bedtime    MEDICATIONS  (PRN):  acetaminophen  Suppository 650 milliGRAM(s) Rectal every 6 hours PRN For Temp greater than 38 C (100.4 F)  acetaminophen  Suppository. 650 milliGRAM(s) Rectal every 6 hours PRN Mild Pain (1 - 3)  ondansetron Injectable 4 milliGRAM(s) IV Push every 6 hours PRN Nausea      RADIOLOGY & ADDITIONAL TESTS:

## 2018-03-13 NOTE — PROGRESS NOTE ADULT - ASSESSMENT
1) toxic metabolic encephalopathy --> secondary to uti  -->completed abx  --> supportive care  --> npo for now, speech following, fluids    2) SDH --> neurosurgery consult appreciated  --> repeat ct performed and was stable     3) htn --> elevated  --> iv prn meds    4) dvt prop --> scds    5) drowsiness --> slighlty more awake  --> supportive care    6) vit d def --> vitamin d started

## 2018-03-13 NOTE — PROGRESS NOTE ADULT - SUBJECTIVE AND OBJECTIVE BOX
Patient in bed, on her side, able to move her right side and BLE, but difficult to assess due to limited command following.  Patient opens eyes, states "yeah" to having pain in her head.   Otherwise, no other verbalizations.   Patient is still NPO.  TBI medication appreciated for start.     FUNCTIONAL PROGRESS  3/12  Bed Mobility: Scooting/Bridging:     · Level of [place]	dependent (less than 25% patients effort)	  · Physical Assist/Nonphysical Assist	2 person assist; 1 person assist; nonverbal cues (demo/gestures)	    Bed Mobility: Sit to Supine:     · Level of [place]	moderate assist (50% patients effort)	  · Physical Assist/Nonphysical Assist	1 person assist; verbal cues; nonverbal cues (demo/gestures)	    Bed Mobility: Supine to Sit:     · Level of [place]	moderate assist (50% patients effort)	  · Physical Assist/Nonphysical Assist	1 person assist; nonverbal cues (demo/gestures); verbal cues	    Bed Mobility Analysis:     · Bed Mobility Limitations	decreased ability to use arms for pushing/pulling; decreased ability to use legs for bridging/pushing	  · Impairments Contributing to Impaired Bed Mobility	impaired balance; cognition; decreased flexibility; decreased strength	    Transfer: Sit to Stand:     · Level of [place]	maximum assist (25% patients effort)	  · Physical Assist/Nonphysical Assist	verbal cues; nonverbal cues (demo/gestures); 1 person assist	  · Weight-Bearing Restrictions	full weight-bearing	  · Assistive Device	rolling walker; or without	    Transfer: Stand to Sit:     · Physical Assist/Nonphysical Assist	1 person assist; nonverbal cues (demo/gestures); verbal cues	  · Weight-Bearing Restrictions	full weight-bearing	  · Assistive Device	rolling walker; or without	    Sit/Stand Transfer Safety Analysis:     · Transfer Safety Concerns Noted	decreased safety awareness; decreased weight-shifting ability; decreased sequencing ability; losing balance	  · Impairments Contributing to Impaired Transfers	cognition; decreased flexibility; decreased strength; decreased ROM; impaired balance	    Gait Skills:     · Level of [place]	maximum assist (25% patients effort)	  · Physical Assist/Nonphysical Assist	1 person assist; nonverbal cues (demo/gestures); verbal cues	  · Weight-Bearing Restrictions	full weight-bearing	  · Assistive Device	rolling walker	  · Gait Distance	1 step with left LE	    REVIEW OF SYSTEMS  Constitutional - No fever   Neurological - +loss of strength    VITALS  T(C): 26.7 (03-13-18 @ 06:10), Max: 37.4 (03-12-18 @ 08:38)  HR: 80 (03-13-18 @ 06:10) (54 - 80)  BP: 165/78 (03-13-18 @ 06:10) (147/73 - 165/78)  RR: 18 (03-12-18 @ 21:30) (18 - 18)  SpO2: 96% (03-12-18 @ 21:30) (96% - 96%)  Wt(kg): --    MEDICATIONS   acetaminophen  Suppository 650 milliGRAM(s) every 6 hours PRN  acetaminophen  Suppository. 650 milliGRAM(s) every 6 hours PRN  amantadine 100 milliGRAM(s) <User Schedule>  dextrose 5% + lactated ringers. 1000 milliLiter(s) <Continuous>  enalaprilat Injectable 2.5 milliGRAM(s) every 6 hours  FLUoxetine 10 milliGRAM(s) daily  melatonin 3 milliGRAM(s) at bedtime  ondansetron Injectable 4 milliGRAM(s) every 6 hours PRN    RECENT LABS/IMAGING    03-13    139  |  103  |  10.0  ----------------------------<  97  3.8   |  24.0  |  0.86    Ca    8.7      13 Mar 2018 06:20      ----------------------------------------------------------------------  PHYSICAL EXAM  Constitutional - NAD  Extremities - No edema  Neurologic Exam -                    Cognitive - Awake, Alert     Communication - Stated yes x1     Cranial Nerves - Facial droop     Motor - Moves all extremitities  ----------------------------------------------------------------------------------------  ASSESSMENT/PLAN  88y Female with functional deficits after sustaining a left SDH from a fall  Pain - Tylenol  Diet - NPO, IVF  UTI - Rocephin  DVT PPX - SCDs  TBI - Prozac 10mg daily, Melatonin 3mg HS, Amantadine 100mg Q6AM/12PM to assist with arousal/executive function  Rehab - Will need better info of prior functional status prior to making recommendations for rehab. If patient was independent prior and the TBI contributed to her current functional status, then ACUTE rehab may be appropriate.

## 2018-03-14 LAB
CULTURE RESULTS: SIGNIFICANT CHANGE UP
CULTURE RESULTS: SIGNIFICANT CHANGE UP
SPECIMEN SOURCE: SIGNIFICANT CHANGE UP
SPECIMEN SOURCE: SIGNIFICANT CHANGE UP

## 2018-03-14 PROCEDURE — 99232 SBSQ HOSP IP/OBS MODERATE 35: CPT

## 2018-03-14 PROCEDURE — 99233 SBSQ HOSP IP/OBS HIGH 50: CPT

## 2018-03-14 RX ORDER — ACETAMINOPHEN 500 MG
1000 TABLET ORAL EVERY 12 HOURS
Qty: 0 | Refills: 0 | Status: COMPLETED | OUTPATIENT
Start: 2018-03-14 | End: 2018-03-14

## 2018-03-14 RX ADMIN — Medication 2.5 MILLIGRAM(S): at 11:14

## 2018-03-14 RX ADMIN — Medication 1 PATCH: at 11:12

## 2018-03-14 RX ADMIN — Medication 3 MILLIGRAM(S): at 21:55

## 2018-03-14 RX ADMIN — SODIUM CHLORIDE 75 MILLILITER(S): 9 INJECTION, SOLUTION INTRAVENOUS at 02:07

## 2018-03-14 RX ADMIN — SODIUM CHLORIDE 75 MILLILITER(S): 9 INJECTION, SOLUTION INTRAVENOUS at 17:39

## 2018-03-14 RX ADMIN — Medication 1000 MILLIGRAM(S): at 17:15

## 2018-03-14 RX ADMIN — Medication 400 MILLIGRAM(S): at 17:36

## 2018-03-14 RX ADMIN — Medication 2.5 MILLIGRAM(S): at 06:12

## 2018-03-14 RX ADMIN — Medication 2.5 MILLIGRAM(S): at 17:38

## 2018-03-14 RX ADMIN — Medication 2.5 MILLIGRAM(S): at 02:07

## 2018-03-14 RX ADMIN — SODIUM CHLORIDE 75 MILLILITER(S): 9 INJECTION, SOLUTION INTRAVENOUS at 06:13

## 2018-03-14 NOTE — PROGRESS NOTE ADULT - SUBJECTIVE AND OBJECTIVE BOX
Patient is a 88y old  Female who presents with a chief complaint of SDH s/p fall 2 weeks prior (10 Mar 2018 02:51)      HPI:  88F with PMH Afib, CVA, DM, benign heart murmur presents to ED with "Altered mental status" and "fever". Per her son, the patient became "non-verbal this AM". Per son, the patient does have dementia.  She is ambulatory with assistance.  She can usually speak and say her name/.  She was not able to do that this morning, and that was when her family became concerned.  Per the son, the patient also fell and hit her head about 2 weeks ago. Since her fall she had been in her usual state of health until today. Her appetite is unchanged. No nausea or vomiting as per son. She had been complaining of headache since her fall.  They did not seek any medical attention for the fall. The patient is prone to urinary tract infections. Pt currently takes ASA 81mg and last dose was yesterday 3/8/18.      Patient is lying comfortably in bed. She appears to be lethargic and non-verbal. Able to open her eyes to pain, but unable to sustain eye opening for more than a few seconds. Not able to follow simple commands. AOx0. She is moving all extremities spontaneously. Localizes to pain in all extremities. (10 Mar 2018 02:51)    FAMILY HISTORY:  No pertinent family history in first degree relatives      INTERVAL HPI/OVERNIGHT EVENTS:  Pt. in bed sleeping, unchanged mental state, non-verbal  Pt. is seen and examined at bedside on 5T.  Unable to assess ROS due to mentation.      PAST MEDICAL & SURGICAL HISTORY:  CVA (cerebral vascular accident)  Diabetes  Atrial fibrillation  High cholesterol  Hypertension  Delivered by  section  S/P tonsillectomy      Allergies    No Known Drug Allergies  Shellfish (Rash)    Intolerances    Vital Signs Last 24 Hrs  T(C): 37 (14 Mar 2018 08:42), Max: 37.7 (13 Mar 2018 19:20)  T(F): 98.6 (14 Mar 2018 08:42), Max: 99.8 (13 Mar 2018 19:20)  HR: 68 (14 Mar 2018 08:42) (67 - 84)  BP: 166/69 (14 Mar 2018 08:42) (148/74 - 175/74)  BP(mean): --  RR: 18 (14 Mar 2018 08:42) (16 - 18)  SpO2: 98% (14 Mar 2018 08:42) (97% - 98%)      RADIOLOGY & ADDITIONAL STUDIES:    MEDICATIONS:  acetaminophen  Suppository 650 milliGRAM(s) Rectal every 6 hours PRN  acetaminophen  Suppository. 650 milliGRAM(s) Rectal every 6 hours PRN  amantadine 100 milliGRAM(s) Oral <User Schedule>  cholecalciferol 1000 Unit(s) Oral daily  cloNIDine Patch 0.1 mG/24Hr(s) 1 patch Topical once  dextrose 5% + lactated ringers. 1000 milliLiter(s) IV Continuous <Continuous>  enalaprilat Injectable 2.5 milliGRAM(s) IV Push every 6 hours  FLUoxetine 10 milliGRAM(s) Oral daily  melatonin 3 milliGRAM(s) Oral at bedtime  ondansetron Injectable 4 milliGRAM(s) IV Push every 6 hours PRN

## 2018-03-14 NOTE — PROGRESS NOTE ADULT - ASSESSMENT
1) toxic metabolic encephalopathy --> secondary to uti  -->completed abx  --> supportive care  --> npo for now, speech following, fluids    2) SDH --> neurosurgery consult appreciated  --> repeat ct performed and was stable     3) htn --> elevated  --> iv prn meds    4) dvt prop --> scds    5) drowsiness --> slighlty more awake  --> supportive care    6) vit d def --> vitamin d started 88F with PMH Afib, CVA, DM, benign heart murmur presents to ED with "Altered mental status" and "fever".   Pt. is here post SDH after the fall, and UTI (as per family hx of frequent UTIs)  1) toxic metabolic encephalopathy --> secondary to uti  -->completed abx  --> supportive care  --> npo for now, speech following,   --> IVF  --> Swallow re-eval when family is present  --> Palliative Consult    2) SDH --> neurosurgery consult appreciated  --> functional deficits after sustaining a left SDH from a fall  --> repeat ct performed and was stable   --> Tylenol BID recommended by  for probable headaches and discomfort    3) htn --> elevated  --> iv prn meds    4) dvt prop --> scds    5) drowsiness --> slighlty more awake  --> supportive care    6) vit d def --> vitamin d started

## 2018-03-14 NOTE — PROGRESS NOTE ADULT - SUBJECTIVE AND OBJECTIVE BOX
Patient is in bed, eyes are closed, does not open them. But moves in bed, with her legs flexing up and bending her arms.   No verbalization. Moaning.    FUNCTIONAL PROGRESS  Total     REVIEW OF SYSTEMS  Constitutional - No fever,  +fatigue  Neurological - +loss of strength    VITALS  T(C): 37 (03-14-18 @ 08:42), Max: 37.7 (03-13-18 @ 19:20)  HR: 68 (03-14-18 @ 08:42) (67 - 84)  BP: 166/69 (03-14-18 @ 08:42) (129/67 - 175/74)  RR: 18 (03-14-18 @ 08:42) (16 - 18)  SpO2: 98% (03-14-18 @ 08:42) (97% - 98%)  Wt(kg): --    MEDICATIONS   acetaminophen  Suppository 650 milliGRAM(s) every 6 hours PRN  acetaminophen  Suppository. 650 milliGRAM(s) every 6 hours PRN  amantadine 100 milliGRAM(s) <User Schedule>  cholecalciferol 1000 Unit(s) daily  cloNIDine Patch 0.1 mG/24Hr(s) 1 patch once  dextrose 5% + lactated ringers. 1000 milliLiter(s) <Continuous>  enalaprilat Injectable 2.5 milliGRAM(s) every 6 hours  FLUoxetine 10 milliGRAM(s) daily  melatonin 3 milliGRAM(s) at bedtime  ondansetron Injectable 4 milliGRAM(s) every 6 hours PRN      RECENT LABS/IMAGING    03-13    139  |  103  |  10.0  ----------------------------<  97  3.8   |  24.0  |  0.86    Ca    8.7      13 Mar 2018 06:20      ---------------------------------------------------------  PHYSICAL EXAM  Constitutional - Uncomfortable, Moaning a little  Extremities - No edema  Neurologic Exam -                    Cognitive - Eyes closed     Communication - No verbalizations, moaning intermittently     Cranial Nerves - Facial droop     Motor - Spontaneously flexes UE/LE, needs stimulation  ----------------------------------------------------------------------------------------  ASSESSMENT/PLAN  88y Female with functional deficits after sustaining a left SDH from a fall  Pain - Tylenol - recommend BID, patient did state yes to head pain yesterday and is moaning intermittently  Diet - NPO, IVF  UTI - Rocephin  DVT PPX - SCDs  TBI - Trialing Prozac 10mg daily, Melatonin 3mg HS, Amantadine 100mg Q6AM/12PM to assist with arousal/executive function. If no improvement, likely representation of poor recovery resilience   Rehab - Unclear what the previous functional level was. But patient is elderly and had significant injury. Patient may not achieve a significant recovery that would lead to prior functional level of independence, if that is what it was. Would recommend palliative consultation.

## 2018-03-15 PROCEDURE — 99233 SBSQ HOSP IP/OBS HIGH 50: CPT

## 2018-03-15 PROCEDURE — 99232 SBSQ HOSP IP/OBS MODERATE 35: CPT

## 2018-03-15 RX ORDER — LISINOPRIL 2.5 MG/1
10 TABLET ORAL DAILY
Qty: 0 | Refills: 0 | Status: DISCONTINUED | OUTPATIENT
Start: 2018-03-15 | End: 2018-03-16

## 2018-03-15 RX ORDER — HYDRALAZINE HCL 50 MG
10 TABLET ORAL ONCE
Qty: 0 | Refills: 0 | Status: COMPLETED | OUTPATIENT
Start: 2018-03-15 | End: 2018-03-15

## 2018-03-15 RX ORDER — AMLODIPINE BESYLATE 2.5 MG/1
10 TABLET ORAL DAILY
Qty: 0 | Refills: 0 | Status: DISCONTINUED | OUTPATIENT
Start: 2018-03-15 | End: 2018-03-17

## 2018-03-15 RX ADMIN — SODIUM CHLORIDE 75 MILLILITER(S): 9 INJECTION, SOLUTION INTRAVENOUS at 05:52

## 2018-03-15 RX ADMIN — Medication 100 MILLIGRAM(S): at 05:51

## 2018-03-15 RX ADMIN — Medication 2.5 MILLIGRAM(S): at 00:10

## 2018-03-15 RX ADMIN — SODIUM CHLORIDE 75 MILLILITER(S): 9 INJECTION, SOLUTION INTRAVENOUS at 17:55

## 2018-03-15 RX ADMIN — Medication 3 MILLIGRAM(S): at 21:27

## 2018-03-15 RX ADMIN — AMLODIPINE BESYLATE 10 MILLIGRAM(S): 2.5 TABLET ORAL at 11:24

## 2018-03-15 RX ADMIN — Medication 2.5 MILLIGRAM(S): at 05:51

## 2018-03-15 RX ADMIN — LISINOPRIL 10 MILLIGRAM(S): 2.5 TABLET ORAL at 17:55

## 2018-03-15 RX ADMIN — Medication 10 MILLIGRAM(S): at 20:11

## 2018-03-15 NOTE — PROGRESS NOTE ADULT - SUBJECTIVE AND OBJECTIVE BOX
Chart reviewed Patient seen at bedside.   Keeps eyes closed, but opened them twice with max encouragement.      FUNCTIONAL PROGRESS  Total assist at this time, but per nurse more interactive when family aroaund    REVIEW OF SYSTEMS  limited due to sleepiness/? cooperation    Vital Signs Last 24 Hrs  T(C): 36.6 (15 Mar 2018 07:54), Max: 36.9 (14 Mar 2018 21:50)  T(F): 97.8 (15 Mar 2018 07:54), Max: 98.5 (14 Mar 2018 21:50)  HR: 73 (15 Mar 2018 07:54) (70 - 76)  BP: 174/86 (15 Mar 2018 07:54) (168/80 - 174/86)  BP(mean): --  RR: 18 (15 Mar 2018 07:54) (18 - 18)  SpO2: 97% (15 Mar 2018 07:54) (96% - 97%)      --------------------------------------------------------  PHYSICAL EXAM  Constitutional - NAD, eyes closed  Heart:S1S2+  Neurologic Exam -                    Cognitive - Eyes closed, did not follow commands for me, but moves limbs spontaneously    MEDICATIONS  (STANDING):  amantadine 100 milliGRAM(s) Oral <User Schedule>  amLODIPine   Tablet 10 milliGRAM(s) Oral daily  cholecalciferol 1000 Unit(s) Oral daily  dextrose 5% + lactated ringers. 1000 milliLiter(s) (75 mL/Hr) IV Continuous <Continuous>  FLUoxetine 10 milliGRAM(s) Oral daily  lisinopril 10 milliGRAM(s) Oral daily  melatonin 3 milliGRAM(s) Oral at bedtime  oseltamivir 30 milliGRAM(s) Oral daily    MEDICATIONS  (PRN):  acetaminophen  Suppository 650 milliGRAM(s) Rectal every 6 hours PRN For Temp greater than 38 C (100.4 F)  acetaminophen  Suppository. 650 milliGRAM(s) Rectal every 6 hours PRN Mild Pain (1 - 3)  ondansetron Injectable 4 milliGRAM(s) IV Push every 6 hours PRN Nausea         ----------------------------------------------------------------------------------------  ASSESSMENT/PLAN  88y Female with functional deficits after sustaining a left SDH from a fall- Most likely at baseline  Pain - Tylenol - prn  UTI - Rocephin  DVT PPX - SCDs  TBI - continue Prozac 10mg daily, Melatonin 3mg HS, Amantadine 100mg Q6AM/12PM to assist with arousal/executive function.  Rehab - As per discussion with RN, patient's son reported that patient is close to baseline and hence discharge home with prior aide services, is more appropriate.  Participation in rehab to achieve any functional progress is unlikely.   Thank you.

## 2018-03-15 NOTE — PROGRESS NOTE ADULT - SUBJECTIVE AND OBJECTIVE BOX
Patient is a 88y old  Female who presents with a chief complaint of SDH s/p fall 2 weeks prior.     HPI: 88F with PMH Afib, CVA, DM, benign heart murmur presents to ED with "Altered mental status" and "fever". Per her son, the patient became "non-verbal this AM". Per son, the patient does have dementia.  She is ambulatory with assistance.  She can usually speak and say her name/.  She was not able to do that this morning, and that was when her family became concerned.  Per the son, the patient also fell and hit her head about 2 weeks ago. Since her fall she had been in her usual state of health until today. Her appetite is unchanged. No nausea or vomiting as per son. She had been complaining of headache since her fall.  They did not seek any medical attention for the fall. The patient is prone to urinary tract infections. Pt currently takes ASA 81mg and last dose was yesterday 3/8/18.      INTERVAL HPI/OVERNIGHT EVENTS:   Pt. in bed, unchanged mental state, non-verbal  Pt. is seen and examined at bedside  Unable to assess ROS due to mentation, appears comfortable, NAD.     MEDICATIONS  (STANDING):  amantadine 100 milliGRAM(s) Oral <User Schedule>  amLODIPine   Tablet 10 milliGRAM(s) Oral daily  cholecalciferol 1000 Unit(s) Oral daily  dextrose 5% + lactated ringers. 1000 milliLiter(s) (75 mL/Hr) IV Continuous <Continuous>  enalaprilat Injectable 2.5 milliGRAM(s) IV Push every 6 hours  FLUoxetine 10 milliGRAM(s) Oral daily  melatonin 3 milliGRAM(s) Oral at bedtime  oseltamivir 30 milliGRAM(s) Oral daily    MEDICATIONS  (PRN):  acetaminophen  Suppository 650 milliGRAM(s) Rectal every 6 hours PRN For Temp greater than 38 C (100.4 F)  acetaminophen  Suppository. 650 milliGRAM(s) Rectal every 6 hours PRN Mild Pain (1 - 3)  ondansetron Injectable 4 milliGRAM(s) IV Push every 6 hours PRN Nausea    Allergies: No Known Drug Allergies  Shellfish (Rash)    REVIEW OF SYSTEMS:  Unable to assess due to mentation    Vital Signs Last 24 Hrs  T(C): 36.6 (15 Mar 2018 07:54), Max: 36.9 (14 Mar 2018 21:50)  T(F): 97.8 (15 Mar 2018 07:54), Max: 98.5 (14 Mar 2018 21:50)  HR: 73 (15 Mar 2018 07:54) (70 - 76)  BP: 174/86 (15 Mar 2018 07:54) (168/80 - 174/86)  BP(mean): --  RR: 18 (15 Mar 2018 07:54) (18 - 18)  SpO2: 97% (15 Mar 2018 07:54) (96% - 97%)    PHYSICAL EXAM:  GENERAL: NAD, well-groomed, well-developed  HEAD:  Atraumatic, Normocephalic  EENT: eyes opened today, able to keep eyes open.   CHEST/LUNG: Clear to auscultation bilaterally; No rales, rhonchi, wheezing, or rubs  HEART: Regular rate and rhythm; No murmurs, rubs, or gallops  ABDOMEN: Soft, Nontender, Nondistended; Bowel sounds present  EXTREMITIES:  2+ Peripheral Pulses, No clubbing, cyanosis, or edema      LABS:    18 @ 17:05  .Blood Blood-Peripheral    No growth at 5 days.    18 @ 17:03  .Urine Catheterized    >100,000 CFU/ml Escherichia coli    ROB  Escherichia coli  Escherichia coli    RADIOLOGY & ADDITIONAL TESTS:    Imaging Personally Reviewed: Patient is a 88y old  Female who presents with a chief complaint of SDH s/p fall 2 weeks prior.     HPI: 88F with PMH Afib, CVA, DM, benign heart murmur presents to ED with "Altered mental status" and "fever". Per her son, the patient became "non-verbal this AM". Per son, the patient does have dementia.  She is ambulatory with assistance.  She can usually speak and say her name/.  She was not able to do that this morning, and that was when her family became concerned.  Per the son, the patient also fell and hit her head about 2 weeks ago. Since her fall she had been in her usual state of health until today. Her appetite is unchanged. No nausea or vomiting as per son. She had been complaining of headache since her fall.  They did not seek any medical attention for the fall. The patient is prone to urinary tract infections. Pt currently takes ASA 81mg and last dose was yesterday 3/8/18.      INTERVAL HPI/OVERNIGHT EVENTS:   Pt. in bed, unchanged mental state, non-verbal  Pt. is seen and examined at bedside  Unable to assess ROS due to mentation, appears comfortable, NAD, pt able to keep eyes opened today.   *Was given report by RN that pt perks up and is more attentive when family visits. Pt was able to eat entire meal family brought for pt yesterday.     MEDICATIONS  (STANDING):  amantadine 100 milliGRAM(s) Oral <User Schedule>  amLODIPine   Tablet 10 milliGRAM(s) Oral daily  cholecalciferol 1000 Unit(s) Oral daily  dextrose 5% + lactated ringers. 1000 milliLiter(s) (75 mL/Hr) IV Continuous <Continuous>  enalaprilat Injectable 2.5 milliGRAM(s) IV Push every 6 hours  FLUoxetine 10 milliGRAM(s) Oral daily  melatonin 3 milliGRAM(s) Oral at bedtime  oseltamivir 30 milliGRAM(s) Oral daily    MEDICATIONS  (PRN):  acetaminophen  Suppository 650 milliGRAM(s) Rectal every 6 hours PRN For Temp greater than 38 C (100.4 F)  acetaminophen  Suppository. 650 milliGRAM(s) Rectal every 6 hours PRN Mild Pain (1 - 3)  ondansetron Injectable 4 milliGRAM(s) IV Push every 6 hours PRN Nausea    Allergies: No Known Drug Allergies  Shellfish (Rash)    REVIEW OF SYSTEMS:  Unable to assess due to mentation    Vital Signs Last 24 Hrs  T(C): 36.6 (15 Mar 2018 07:54), Max: 36.9 (14 Mar 2018 21:50)  T(F): 97.8 (15 Mar 2018 07:54), Max: 98.5 (14 Mar 2018 21:50)  HR: 73 (15 Mar 2018 07:54) (70 - 76)  BP: 174/86 (15 Mar 2018 07:54) (168/80 - 174/86)  BP(mean): --  RR: 18 (15 Mar 2018 07:54) (18 - 18)  SpO2: 97% (15 Mar 2018 07:54) (96% - 97%)    PHYSICAL EXAM:  GENERAL: NAD, well-groomed, well-developed  HEAD:  Atraumatic, Normocephalic  EENT: eyes opened today, able to keep eyes open.   CHEST/LUNG: Clear to auscultation bilaterally; No rales, rhonchi, wheezing, or rubs  HEART: Regular rate and rhythm; No murmurs, rubs, or gallops  ABDOMEN: Soft, Nontender, Nondistended; Bowel sounds present  EXTREMITIES:  2+ Peripheral Pulses, No clubbing, cyanosis, or edema      LABS:    18 @ 17:05  .Blood Blood-Peripheral    No growth at 5 days.    18 @ 17:03  .Urine Catheterized    >100,000 CFU/ml Escherichia coli    ROB  Escherichia coli  Escherichia coli    RADIOLOGY & ADDITIONAL TESTS:    Imaging Personally Reviewed:

## 2018-03-15 NOTE — PROGRESS NOTE ADULT - ASSESSMENT
88F with PMH Afib, CVA, DM, benign heart murmur admitted to [facility] with AMS/fever. As per son, pt can usually speak and say her name/ at baseline, ambulatory with assistance. As reported by son, pt fell 2 weeks ago and they did not seek medical treatment. In ED, pts CT showed chronic bilateral cerebral infarctions and chronic hygromas in addition to more acute appearing right frontal SDH with NO mass affect. Pt also with positive UTI on admission, though reportedly has chronic UTI hx. Pt initially admitted to SICU and later downgraded to medical floor for management of metabolic encephalopathy secondary to UTI. Pt has completed abx and receiving supportive treatment. Pt NPO after failing speech eval. Palliative to consult pt.     1) toxic metabolic encephalopathy   - secondary to uti  - completed abx  - supportive care, IVF   - npo for now, speech following.. swallow re-eval when family is present  - Palliative consult   *pt still not at baseline as per son. Questionable return to baseline given recent trauma/tbi/sdh.     2) SDH   - neurosurgery consult appreciated  - functional deficits after sustaining a left SDH from a fall  - repeat ct performed and was stable   - Tylenol BID recommended by  for probable headaches and discomfort    3)TBI   -Trialing Prozac 10mg daily, Melatonin 3mg HS, Amantadine 100mg Q6AM/12PM to assist with arousal/executive function.   - If no improvement, likely representation of poor recovery resilience   - Rehab: Unclear what the previous functional level was. But patient is elderly and had significant injury. Patient may not achieve a significant recovery that would lead to prior functional level of independence, if that is what it was. Will need better info of prior functional status .. will give family a call today to clarify functional status prior to TBI.   - Palliative care consult     4) htn   - elevated  - Norvasc to be added  - IV PRN BP meds     5) dvt prop   - scds    6) drowsiness   - slighlty more awake  -  supportive care 88F with PMH Afib, CVA, DM, benign heart murmur admitted to [facility] with AMS/fever. As per son, pt can usually speak and say her name/ at baseline, ambulatory with assistance. As reported by son, pt fell 2 weeks ago and they did not seek medical treatment. In ED, pts CT showed chronic bilateral cerebral infarctions and chronic hygromas in addition to more acute appearing right frontal SDH with NO mass affect. Pt also with positive UTI on admission, though reportedly has chronic UTI hx. Pt initially admitted to SICU and later downgraded to medical floor for management of metabolic encephalopathy secondary to UTI.    *Was given report by RN that pt perks up and is more attentive when family visits. Pt was able to eat entire meal family brought for pt yesterday.     1) toxic metabolic encephalopathy   - secondary to uti  - completed abx  - supportive care, IVF   - Questionable full return to baseline given recent trauma/tbi/sdh.     2) SDH   - neurosurgery consult appreciated  - functional deficits after sustaining a left SDH from a fall  - repeat ct performed and was stable   - Tylenol BID recommended by  for probable headaches and discomfort    3)TBI   - Trialing Prozac 10mg daily, Melatonin 3mg HS, Amantadine 100mg Q6AM/12PM to assist with arousal/executive function.   - If no improvement, likely representation of poor recovery resilience   - Adult rehab medicine consult appreciated and recs implemented  - Pt likely to be discharged home tomorrow to home. Family is insisting pt to be discharged home, does not want acute or subacute rehab. As per family, pt has 78 hours of home health care in addition to family at home.     4) htn   - elevated  - Norvasc to be added  - IV PRN BP meds     5) dvt prop   - scds    6) drowsiness   - slighlty more awake  -  supportive care

## 2018-03-15 NOTE — PROGRESS NOTE ADULT - PROVIDER SPECIALTY LIST ADULT
Hospitalist
Internal Medicine
Rehab Medicine
SICU
Internal Medicine
SICU

## 2018-03-16 ENCOUNTER — TRANSCRIPTION ENCOUNTER (OUTPATIENT)
Age: 83
End: 2018-03-16

## 2018-03-16 PROCEDURE — 87086 URINE CULTURE/COLONY COUNT: CPT

## 2018-03-16 PROCEDURE — 80053 COMPREHEN METABOLIC PANEL: CPT

## 2018-03-16 PROCEDURE — 97163 PT EVAL HIGH COMPLEX 45 MIN: CPT

## 2018-03-16 PROCEDURE — 85730 THROMBOPLASTIN TIME PARTIAL: CPT

## 2018-03-16 PROCEDURE — 83605 ASSAY OF LACTIC ACID: CPT

## 2018-03-16 PROCEDURE — 87040 BLOOD CULTURE FOR BACTERIA: CPT

## 2018-03-16 PROCEDURE — 84480 ASSAY TRIIODOTHYRONINE (T3): CPT

## 2018-03-16 PROCEDURE — 81001 URINALYSIS AUTO W/SCOPE: CPT

## 2018-03-16 PROCEDURE — 92610 EVALUATE SWALLOWING FUNCTION: CPT

## 2018-03-16 PROCEDURE — 80048 BASIC METABOLIC PNL TOTAL CA: CPT

## 2018-03-16 PROCEDURE — 85610 PROTHROMBIN TIME: CPT

## 2018-03-16 PROCEDURE — 84100 ASSAY OF PHOSPHORUS: CPT

## 2018-03-16 PROCEDURE — 87186 SC STD MICRODIL/AGAR DIL: CPT

## 2018-03-16 PROCEDURE — 71045 X-RAY EXAM CHEST 1 VIEW: CPT

## 2018-03-16 PROCEDURE — 83735 ASSAY OF MAGNESIUM: CPT

## 2018-03-16 PROCEDURE — 80061 LIPID PANEL: CPT

## 2018-03-16 PROCEDURE — 99285 EMERGENCY DEPT VISIT HI MDM: CPT | Mod: 25

## 2018-03-16 PROCEDURE — 93005 ELECTROCARDIOGRAM TRACING: CPT

## 2018-03-16 PROCEDURE — 83036 HEMOGLOBIN GLYCOSYLATED A1C: CPT

## 2018-03-16 PROCEDURE — 82607 VITAMIN B-12: CPT

## 2018-03-16 PROCEDURE — 70450 CT HEAD/BRAIN W/O DYE: CPT

## 2018-03-16 PROCEDURE — 82306 VITAMIN D 25 HYDROXY: CPT

## 2018-03-16 PROCEDURE — 82962 GLUCOSE BLOOD TEST: CPT

## 2018-03-16 PROCEDURE — 36415 COLL VENOUS BLD VENIPUNCTURE: CPT

## 2018-03-16 PROCEDURE — 96375 TX/PRO/DX INJ NEW DRUG ADDON: CPT

## 2018-03-16 PROCEDURE — 85027 COMPLETE CBC AUTOMATED: CPT

## 2018-03-16 PROCEDURE — 84436 ASSAY OF TOTAL THYROXINE: CPT

## 2018-03-16 PROCEDURE — 99232 SBSQ HOSP IP/OBS MODERATE 35: CPT

## 2018-03-16 PROCEDURE — 84443 ASSAY THYROID STIM HORMONE: CPT

## 2018-03-16 PROCEDURE — 96374 THER/PROPH/DIAG INJ IV PUSH: CPT

## 2018-03-16 PROCEDURE — 92526 ORAL FUNCTION THERAPY: CPT

## 2018-03-16 RX ORDER — HYDRALAZINE HCL 50 MG
1 TABLET ORAL
Qty: 42 | Refills: 0 | OUTPATIENT
Start: 2018-03-16 | End: 2018-03-29

## 2018-03-16 RX ORDER — HYDRALAZINE HCL 50 MG
1 TABLET ORAL
Qty: 0 | Refills: 0 | COMMUNITY

## 2018-03-16 RX ORDER — LISINOPRIL 2.5 MG/1
1 TABLET ORAL
Qty: 0 | Refills: 0 | COMMUNITY
Start: 2018-03-16

## 2018-03-16 RX ORDER — AMLODIPINE BESYLATE 2.5 MG/1
1 TABLET ORAL
Qty: 30 | Refills: 0 | OUTPATIENT
Start: 2018-03-16 | End: 2018-04-14

## 2018-03-16 RX ORDER — LISINOPRIL 2.5 MG/1
20 TABLET ORAL AT BEDTIME
Qty: 0 | Refills: 0 | Status: DISCONTINUED | OUTPATIENT
Start: 2018-03-16 | End: 2018-03-17

## 2018-03-16 RX ORDER — AMLODIPINE BESYLATE 2.5 MG/1
1 TABLET ORAL
Qty: 0 | Refills: 0 | COMMUNITY
Start: 2018-03-16

## 2018-03-16 RX ORDER — HYDRALAZINE HCL 50 MG
10 TABLET ORAL ONCE
Qty: 0 | Refills: 0 | Status: COMPLETED | OUTPATIENT
Start: 2018-03-16 | End: 2018-03-16

## 2018-03-16 RX ORDER — HYDRALAZINE HCL 50 MG
25 TABLET ORAL EVERY 8 HOURS
Qty: 0 | Refills: 0 | Status: DISCONTINUED | OUTPATIENT
Start: 2018-03-16 | End: 2018-03-17

## 2018-03-16 RX ORDER — HYDRALAZINE HCL 50 MG
1 TABLET ORAL
Qty: 90 | Refills: 0 | OUTPATIENT
Start: 2018-03-16 | End: 2018-04-14

## 2018-03-16 RX ADMIN — Medication 30 MILLIGRAM(S): at 12:48

## 2018-03-16 RX ADMIN — Medication 10 MILLIGRAM(S): at 12:48

## 2018-03-16 RX ADMIN — Medication 10 MILLIGRAM(S): at 20:57

## 2018-03-16 RX ADMIN — Medication 100 MILLIGRAM(S): at 12:48

## 2018-03-16 RX ADMIN — Medication 1000 UNIT(S): at 12:48

## 2018-03-16 NOTE — DISCHARGE NOTE ADULT - PATIENT PORTAL LINK FT
You can access the [email or web address], offered by [facility], by registering with the following website: [email or web address]

## 2018-03-16 NOTE — DISCHARGE NOTE ADULT - MEDICATION SUMMARY - MEDICATIONS TO STOP TAKING
I will STOP taking the medications listed below when I get home from the hospital:    enalapril 5 mg oral tablet  -- 1 tab(s) by mouth once a day

## 2018-03-16 NOTE — DISCHARGE NOTE ADULT - MEDICATION SUMMARY - MEDICATIONS TO TAKE
I will START or STAY ON the medications listed below when I get home from the hospital:    aspirin  --  by mouth   -- Indication: For Cerebrovascular accident (CVA) due to bilateral occlusion of middle cerebral arteries    acetaminophen 325 mg oral tablet  -- 2 tab(s) by mouth every 6 hours, As needed, Mild Pain (1 - 3)  -- Indication: For Headache    enalapril 10 mg oral tablet  -- 1 tab(s) by mouth once a day  -- Indication: For HTN    diltiazem 180 mg/24 hours oral tablet, extended release  -- 1 tab(s) by mouth once a day  -- Indication: For Afib    metFORMIN 500 mg oral tablet  -- 1 tab(s) by mouth   -- Indication: For Diabetes    pravastatin 20 mg oral tablet  -- 1 tab(s) by mouth once a day (at bedtime)  -- Indication: For HLD    amLODIPine 10 mg oral tablet  -- 1 tab(s) by mouth once a day  -- Indication: For HTN    hydrALAZINE 50 mg oral tablet  -- 1 tab(s) by mouth 3 times a day  -- Indication: For HTN I will START or STAY ON the medications listed below when I get home from the hospital:    aspirin  --  by mouth   -- Indication: For Cerebrovascular accident (CVA) due to bilateral occlusion of middle cerebral arteries    acetaminophen 325 mg oral tablet  -- 2 tab(s) by mouth every 6 hours, As needed, Mild Pain (1 - 3)  -- Indication: For Headache    enalapril 10 mg oral tablet  -- 1 tab(s) by mouth once a day  -- Indication: For HTN    diltiazem 180 mg/24 hours oral tablet, extended release  -- 1 tab(s) by mouth once a day  -- Indication: For Afib    metFORMIN 500 mg oral tablet  -- 1 tab(s) by mouth   -- Indication: For Diabetes    pravastatin 20 mg oral tablet  -- 1 tab(s) by mouth once a day (at bedtime)  -- Indication: For HLD    amLODIPine 10 mg oral tablet  -- 1 tab(s) by mouth once a day  -- Indication: For HTN    hydrALAZINE 50 mg oral tablet  -- 0.5 tab(s) by mouth 3 times a day   -- Indication: For Htn

## 2018-03-16 NOTE — DISCHARGE NOTE ADULT - PLAN OF CARE
Resolved Secondary to uti  Pt completed antibiotics in hospital  Pt with return to baseline as per patients family  Pt to be discharged home, follow up outpatient with PMD, return to ED if symptoms worsen Tylenol BID for probable headaches and discomfort  Unchanged  Stable for discharge  Follow up with o/p neurology as needed Resume all home meds Enalapril increased from 5mg PO qd to 10mg PO QD  Norvasc 10mg PO QD Added  Hydralazine 50mg PO Q8 Added  Follow up with outpatient PMD for further BP managment/evaluation Statin Diltiazem to be continued

## 2018-03-16 NOTE — DISCHARGE NOTE ADULT - CARE PLAN
Principal Discharge DX:	Metabolic encephalopathy  Goal:	Resolved  Assessment and plan of treatment:	Secondary to uti  Pt completed antibiotics in hospital  Pt with return to baseline as per patients family  Pt to be discharged home, follow up outpatient with PMD, return to ED if symptoms worsen  Secondary Diagnosis:	Subdural hematoma  Assessment and plan of treatment:	Tylenol BID for probable headaches and discomfort  Unchanged  Stable for discharge  Follow up with o/p neurology as needed  Secondary Diagnosis:	Diabetes  Assessment and plan of treatment:	Resume all home meds  Secondary Diagnosis:	Hypertension  Assessment and plan of treatment:	Enalapril increased from 5mg PO qd to 10mg PO QD  Norvasc 10mg PO QD Added  Hydralazine 50mg PO Q8 Added  Follow up with outpatient PMD for further BP managment/evaluation  Secondary Diagnosis:	High cholesterol  Assessment and plan of treatment:	Statin  Secondary Diagnosis:	Atrial fibrillation  Assessment and plan of treatment:	Diltiazem to be continued

## 2018-03-16 NOTE — DISCHARGE NOTE ADULT - HOSPITAL COURSE
88F with PMH Afib, CVA, DM, benign heart murmur admitted to [facility] with AMS/fever. As per son, pt can usually speak and say her name/ at baseline, ambulatory with assistance. As reported by son, pt fell 2 weeks ago and they did not seek medical treatment. In ED, pts CT showed chronic bilateral cerebral infarctions and chronic hygromas in addition to more acute appearing right frontal SDH with NO mass affect. Pt also with positive UTI on admission, though reportedly has chronic UTI hx. Pt initially admitted to SICU and later downgraded to medical floor for management of metabolic encephalopathy secondary to UTI. Pt treated wtih abx, supportive treatment with appropriate response to treatment. According to family, pt at baseline mental functioning. Pt with repeated events of hypertension despite medication. HTN medications readjusted to enalapril 10mg PO QD, norvasc 10 mg PO QD, hydralazine 50 mg po Q8. Pt to follow up wtih o/p PMD for further treatment/evaluation of BP. PMNR has evaluated pt and agrees with discharge to home with prior aide services is appropriate. 88F with PMH Afib, CVA, DM, benign heart murmur admitted to [facility] with AMS/fever. As per son, pt can usually speak and say her name/ at baseline, ambulatory with assistance. As reported by son, pt fell 2 weeks ago and they did not seek medical treatment. In ED, pts CT showed chronic bilateral cerebral infarctions and chronic hygromas in addition to more acute appearing right frontal SDH with NO mass affect. Pt also with positive UTI on admission, though reportedly has chronic UTI hx. Pt initially admitted to SICU and later downgraded to medical floor for management of metabolic encephalopathy secondary to UTI. Pt treated wtih abx, supportive treatment with appropriate response to treatment. According to family, pt at baseline mental functioning. Pt with repeated events of hypertension despite medication. HTN medications readjusted to enalapril 10mg PO QD, norvasc 10 mg PO QD, hydralazine 50 mg po Q8. Pt to follow up wtih o/p PMD for further treatment/evaluation of BP. PMNR has evaluated pt and agrees with discharge to home with prior aide services is appropriate.     Spoke to pts son on telephone before discharge. Instructed son to have pt see PMD within 1 week for BP managemnet. Also informed son that new BP scripts were sent to pts preferred pharmacy [address].

## 2018-03-17 VITALS
TEMPERATURE: 99 F | SYSTOLIC BLOOD PRESSURE: 154 MMHG | HEART RATE: 86 BPM | RESPIRATION RATE: 20 BRPM | DIASTOLIC BLOOD PRESSURE: 84 MMHG

## 2018-03-17 PROCEDURE — 99239 HOSP IP/OBS DSCHRG MGMT >30: CPT

## 2018-03-17 RX ORDER — HYDRALAZINE HCL 50 MG
0.5 TABLET ORAL
Qty: 45 | Refills: 0 | OUTPATIENT
Start: 2018-03-17 | End: 2018-04-15

## 2018-03-19 ENCOUNTER — INPATIENT (INPATIENT)
Facility: HOSPITAL | Age: 83
LOS: 1 days | Discharge: HOME | DRG: 304 | End: 2018-03-21
Attending: HOSPITALIST | Admitting: HOSPITALIST
Payer: MEDICARE

## 2018-03-19 VITALS
WEIGHT: 104.94 LBS | OXYGEN SATURATION: 99 % | RESPIRATION RATE: 16 BRPM | TEMPERATURE: 97 F | SYSTOLIC BLOOD PRESSURE: 240 MMHG | DIASTOLIC BLOOD PRESSURE: 79 MMHG | HEART RATE: 86 BPM | HEIGHT: 59 IN

## 2018-03-19 DIAGNOSIS — R74.8 ABNORMAL LEVELS OF OTHER SERUM ENZYMES: ICD-10-CM

## 2018-03-19 DIAGNOSIS — Z90.89 ACQUIRED ABSENCE OF OTHER ORGANS: Chronic | ICD-10-CM

## 2018-03-19 DIAGNOSIS — I62.00 NONTRAUMATIC SUBDURAL HEMORRHAGE, UNSPECIFIED: ICD-10-CM

## 2018-03-19 DIAGNOSIS — I24.9 ACUTE ISCHEMIC HEART DISEASE, UNSPECIFIED: ICD-10-CM

## 2018-03-19 DIAGNOSIS — R50.9 FEVER, UNSPECIFIED: ICD-10-CM

## 2018-03-19 DIAGNOSIS — I48.2 CHRONIC ATRIAL FIBRILLATION: ICD-10-CM

## 2018-03-19 DIAGNOSIS — I16.0 HYPERTENSIVE URGENCY: ICD-10-CM

## 2018-03-19 DIAGNOSIS — E78.00 PURE HYPERCHOLESTEROLEMIA, UNSPECIFIED: ICD-10-CM

## 2018-03-19 DIAGNOSIS — E11.9 TYPE 2 DIABETES MELLITUS WITHOUT COMPLICATIONS: ICD-10-CM

## 2018-03-19 LAB
ALBUMIN SERPL ELPH-MCNC: 3.5 G/DL — SIGNIFICANT CHANGE UP (ref 3.3–5.2)
ALP SERPL-CCNC: 83 U/L — SIGNIFICANT CHANGE UP (ref 40–120)
ALT FLD-CCNC: 9 U/L — SIGNIFICANT CHANGE UP
ANION GAP SERPL CALC-SCNC: 16 MMOL/L — SIGNIFICANT CHANGE UP (ref 5–17)
APPEARANCE UR: CLEAR — SIGNIFICANT CHANGE UP
APTT BLD: 30.6 SEC — SIGNIFICANT CHANGE UP (ref 27.5–37.4)
AST SERPL-CCNC: 21 U/L — SIGNIFICANT CHANGE UP
BACTERIA # UR AUTO: ABNORMAL
BASOPHILS # BLD AUTO: 0 K/UL — SIGNIFICANT CHANGE UP (ref 0–0.2)
BASOPHILS NFR BLD AUTO: 0.3 % — SIGNIFICANT CHANGE UP (ref 0–2)
BILIRUB SERPL-MCNC: 0.4 MG/DL — SIGNIFICANT CHANGE UP (ref 0.4–2)
BILIRUB UR-MCNC: NEGATIVE — SIGNIFICANT CHANGE UP
BUN SERPL-MCNC: 16 MG/DL — SIGNIFICANT CHANGE UP (ref 8–20)
CALCIUM SERPL-MCNC: 8.8 MG/DL — SIGNIFICANT CHANGE UP (ref 8.6–10.2)
CHLORIDE SERPL-SCNC: 103 MMOL/L — SIGNIFICANT CHANGE UP (ref 98–107)
CO2 SERPL-SCNC: 25 MMOL/L — SIGNIFICANT CHANGE UP (ref 22–29)
COLOR SPEC: SIGNIFICANT CHANGE UP
CREAT SERPL-MCNC: 0.83 MG/DL — SIGNIFICANT CHANGE UP (ref 0.5–1.3)
DIFF PNL FLD: ABNORMAL
EOSINOPHIL # BLD AUTO: 0 K/UL — SIGNIFICANT CHANGE UP (ref 0–0.5)
EOSINOPHIL NFR BLD AUTO: 0.5 % — SIGNIFICANT CHANGE UP (ref 0–6)
EPI CELLS # UR: SIGNIFICANT CHANGE UP
GLUCOSE SERPL-MCNC: 142 MG/DL — HIGH (ref 70–115)
GLUCOSE UR QL: NEGATIVE MG/DL — SIGNIFICANT CHANGE UP
HCT VFR BLD CALC: 35.3 % — LOW (ref 37–47)
HGB BLD-MCNC: 11.1 G/DL — LOW (ref 12–16)
INR BLD: 1.04 RATIO — SIGNIFICANT CHANGE UP (ref 0.88–1.16)
KETONES UR-MCNC: NEGATIVE — SIGNIFICANT CHANGE UP
LEUKOCYTE ESTERASE UR-ACNC: NEGATIVE — SIGNIFICANT CHANGE UP
LYMPHOCYTES # BLD AUTO: 0.8 K/UL — LOW (ref 1–4.8)
LYMPHOCYTES # BLD AUTO: 10.9 % — LOW (ref 20–55)
MCHC RBC-ENTMCNC: 27.1 PG — SIGNIFICANT CHANGE UP (ref 27–31)
MCHC RBC-ENTMCNC: 31.4 G/DL — LOW (ref 32–36)
MCV RBC AUTO: 86.1 FL — SIGNIFICANT CHANGE UP (ref 81–99)
MONOCYTES # BLD AUTO: 0.5 K/UL — SIGNIFICANT CHANGE UP (ref 0–0.8)
MONOCYTES NFR BLD AUTO: 7.3 % — SIGNIFICANT CHANGE UP (ref 3–10)
NEUTROPHILS # BLD AUTO: 5.9 K/UL — SIGNIFICANT CHANGE UP (ref 1.8–8)
NEUTROPHILS NFR BLD AUTO: 80.5 % — HIGH (ref 37–73)
NITRITE UR-MCNC: NEGATIVE — SIGNIFICANT CHANGE UP
PH UR: 6 — SIGNIFICANT CHANGE UP (ref 5–8)
PLATELET # BLD AUTO: 254 K/UL — SIGNIFICANT CHANGE UP (ref 150–400)
POTASSIUM SERPL-MCNC: 3.5 MMOL/L — SIGNIFICANT CHANGE UP (ref 3.5–5.3)
POTASSIUM SERPL-SCNC: 3.5 MMOL/L — SIGNIFICANT CHANGE UP (ref 3.5–5.3)
PROT SERPL-MCNC: 6.7 G/DL — SIGNIFICANT CHANGE UP (ref 6.6–8.7)
PROT UR-MCNC: 500 MG/DL
PROTHROM AB SERPL-ACNC: 11.4 SEC — SIGNIFICANT CHANGE UP (ref 9.8–12.7)
RBC # BLD: 4.1 M/UL — LOW (ref 4.4–5.2)
RBC # FLD: 14.1 % — SIGNIFICANT CHANGE UP (ref 11–15.6)
RBC CASTS # UR COMP ASSIST: ABNORMAL /HPF (ref 0–4)
SODIUM SERPL-SCNC: 144 MMOL/L — SIGNIFICANT CHANGE UP (ref 135–145)
SP GR SPEC: 1.01 — SIGNIFICANT CHANGE UP (ref 1.01–1.02)
TROPONIN T SERPL-MCNC: 0.09 NG/ML — HIGH (ref 0–0.06)
UROBILINOGEN FLD QL: NEGATIVE MG/DL — SIGNIFICANT CHANGE UP
WBC # BLD: 7.3 K/UL — SIGNIFICANT CHANGE UP (ref 4.8–10.8)
WBC # FLD AUTO: 7.3 K/UL — SIGNIFICANT CHANGE UP (ref 4.8–10.8)
WBC UR QL: SIGNIFICANT CHANGE UP

## 2018-03-19 PROCEDURE — 99285 EMERGENCY DEPT VISIT HI MDM: CPT

## 2018-03-19 PROCEDURE — 99497 ADVNCD CARE PLAN 30 MIN: CPT | Mod: 25

## 2018-03-19 PROCEDURE — 71250 CT THORAX DX C-: CPT | Mod: 26

## 2018-03-19 PROCEDURE — 93010 ELECTROCARDIOGRAM REPORT: CPT | Mod: 76

## 2018-03-19 PROCEDURE — 99223 1ST HOSP IP/OBS HIGH 75: CPT

## 2018-03-19 PROCEDURE — 71045 X-RAY EXAM CHEST 1 VIEW: CPT | Mod: 26

## 2018-03-19 PROCEDURE — 93971 EXTREMITY STUDY: CPT | Mod: 26,LT

## 2018-03-19 PROCEDURE — 70450 CT HEAD/BRAIN W/O DYE: CPT | Mod: 26

## 2018-03-19 RX ORDER — HYDRALAZINE HCL 50 MG
25 TABLET ORAL THREE TIMES A DAY
Qty: 0 | Refills: 0 | Status: DISCONTINUED | OUTPATIENT
Start: 2018-03-19 | End: 2018-03-21

## 2018-03-19 RX ORDER — DILTIAZEM HCL 120 MG
180 CAPSULE, EXT RELEASE 24 HR ORAL DAILY
Qty: 0 | Refills: 0 | Status: DISCONTINUED | OUTPATIENT
Start: 2018-03-19 | End: 2018-03-21

## 2018-03-19 RX ORDER — DEXTROSE 50 % IN WATER 50 %
25 SYRINGE (ML) INTRAVENOUS ONCE
Qty: 0 | Refills: 0 | Status: DISCONTINUED | OUTPATIENT
Start: 2018-03-19 | End: 2018-03-21

## 2018-03-19 RX ORDER — ATORVASTATIN CALCIUM 80 MG/1
10 TABLET, FILM COATED ORAL AT BEDTIME
Qty: 0 | Refills: 0 | Status: DISCONTINUED | OUTPATIENT
Start: 2018-03-19 | End: 2018-03-21

## 2018-03-19 RX ORDER — ASPIRIN/CALCIUM CARB/MAGNESIUM 324 MG
81 TABLET ORAL DAILY
Qty: 0 | Refills: 0 | Status: DISCONTINUED | OUTPATIENT
Start: 2018-03-19 | End: 2018-03-21

## 2018-03-19 RX ORDER — LABETALOL HCL 100 MG
10 TABLET ORAL ONCE
Qty: 0 | Refills: 0 | Status: COMPLETED | OUTPATIENT
Start: 2018-03-19 | End: 2018-03-19

## 2018-03-19 RX ORDER — VANCOMYCIN HCL 1 G
750 VIAL (EA) INTRAVENOUS EVERY 12 HOURS
Qty: 0 | Refills: 0 | Status: DISCONTINUED | OUTPATIENT
Start: 2018-03-20 | End: 2018-03-21

## 2018-03-19 RX ORDER — GLUCAGON INJECTION, SOLUTION 0.5 MG/.1ML
1 INJECTION, SOLUTION SUBCUTANEOUS ONCE
Qty: 0 | Refills: 0 | Status: DISCONTINUED | OUTPATIENT
Start: 2018-03-19 | End: 2018-03-21

## 2018-03-19 RX ORDER — FUROSEMIDE 40 MG
40 TABLET ORAL
Qty: 0 | Refills: 0 | Status: DISCONTINUED | OUTPATIENT
Start: 2018-03-19 | End: 2018-03-21

## 2018-03-19 RX ORDER — SODIUM CHLORIDE 9 MG/ML
1000 INJECTION, SOLUTION INTRAVENOUS
Qty: 0 | Refills: 0 | Status: DISCONTINUED | OUTPATIENT
Start: 2018-03-19 | End: 2018-03-21

## 2018-03-19 RX ORDER — ACETAMINOPHEN 500 MG
650 TABLET ORAL EVERY 6 HOURS
Qty: 0 | Refills: 0 | Status: DISCONTINUED | OUTPATIENT
Start: 2018-03-19 | End: 2018-03-21

## 2018-03-19 RX ORDER — CLOPIDOGREL BISULFATE 75 MG/1
300 TABLET, FILM COATED ORAL ONCE
Qty: 0 | Refills: 0 | Status: COMPLETED | OUTPATIENT
Start: 2018-03-19 | End: 2018-03-19

## 2018-03-19 RX ORDER — INSULIN LISPRO 100/ML
VIAL (ML) SUBCUTANEOUS
Qty: 0 | Refills: 0 | Status: DISCONTINUED | OUTPATIENT
Start: 2018-03-19 | End: 2018-03-21

## 2018-03-19 RX ORDER — ONDANSETRON 8 MG/1
4 TABLET, FILM COATED ORAL ONCE
Qty: 0 | Refills: 0 | Status: DISCONTINUED | OUTPATIENT
Start: 2018-03-19 | End: 2018-03-21

## 2018-03-19 RX ORDER — PIPERACILLIN AND TAZOBACTAM 4; .5 G/20ML; G/20ML
3.38 INJECTION, POWDER, LYOPHILIZED, FOR SOLUTION INTRAVENOUS EVERY 8 HOURS
Qty: 0 | Refills: 0 | Status: DISCONTINUED | OUTPATIENT
Start: 2018-03-19 | End: 2018-03-21

## 2018-03-19 RX ORDER — AMLODIPINE BESYLATE 2.5 MG/1
10 TABLET ORAL DAILY
Qty: 0 | Refills: 0 | Status: DISCONTINUED | OUTPATIENT
Start: 2018-03-19 | End: 2018-03-21

## 2018-03-19 RX ORDER — VANCOMYCIN HCL 1 G
VIAL (EA) INTRAVENOUS
Qty: 0 | Refills: 0 | Status: DISCONTINUED | OUTPATIENT
Start: 2018-03-19 | End: 2018-03-21

## 2018-03-19 RX ORDER — ASPIRIN/CALCIUM CARB/MAGNESIUM 324 MG
325 TABLET ORAL ONCE
Qty: 0 | Refills: 0 | Status: COMPLETED | OUTPATIENT
Start: 2018-03-19 | End: 2018-03-19

## 2018-03-19 RX ORDER — VANCOMYCIN HCL 1 G
VIAL (EA) INTRAVENOUS
Qty: 0 | Refills: 0 | Status: DISCONTINUED | OUTPATIENT
Start: 2018-03-19 | End: 2018-03-19

## 2018-03-19 RX ORDER — HYDRALAZINE HCL 50 MG
10 TABLET ORAL EVERY 4 HOURS
Qty: 0 | Refills: 0 | Status: DISCONTINUED | OUTPATIENT
Start: 2018-03-19 | End: 2018-03-21

## 2018-03-19 RX ORDER — DEXTROSE 50 % IN WATER 50 %
1 SYRINGE (ML) INTRAVENOUS ONCE
Qty: 0 | Refills: 0 | Status: DISCONTINUED | OUTPATIENT
Start: 2018-03-19 | End: 2018-03-21

## 2018-03-19 RX ORDER — VANCOMYCIN HCL 1 G
750 VIAL (EA) INTRAVENOUS ONCE
Qty: 0 | Refills: 0 | Status: COMPLETED | OUTPATIENT
Start: 2018-03-19 | End: 2018-03-19

## 2018-03-19 RX ORDER — ACETAMINOPHEN 500 MG
650 TABLET ORAL ONCE
Qty: 0 | Refills: 0 | Status: DISCONTINUED | OUTPATIENT
Start: 2018-03-19 | End: 2018-03-21

## 2018-03-19 RX ORDER — DEXTROSE 50 % IN WATER 50 %
12.5 SYRINGE (ML) INTRAVENOUS ONCE
Qty: 0 | Refills: 0 | Status: DISCONTINUED | OUTPATIENT
Start: 2018-03-19 | End: 2018-03-21

## 2018-03-19 RX ADMIN — Medication 25 MILLIGRAM(S): at 22:37

## 2018-03-19 RX ADMIN — Medication 10 MILLIGRAM(S): at 19:45

## 2018-03-19 RX ADMIN — Medication 150 MILLIGRAM(S): at 22:41

## 2018-03-19 RX ADMIN — Medication 325 MILLIGRAM(S): at 18:47

## 2018-03-19 RX ADMIN — Medication 10 MILLIGRAM(S): at 18:09

## 2018-03-19 RX ADMIN — Medication 40 MILLIGRAM(S): at 22:37

## 2018-03-19 RX ADMIN — CLOPIDOGREL BISULFATE 300 MILLIGRAM(S): 75 TABLET, FILM COATED ORAL at 18:49

## 2018-03-19 RX ADMIN — ATORVASTATIN CALCIUM 10 MILLIGRAM(S): 80 TABLET, FILM COATED ORAL at 22:37

## 2018-03-19 RX ADMIN — Medication 180 MILLIGRAM(S): at 19:45

## 2018-03-19 NOTE — CHART NOTE - NSCHARTNOTEFT_GEN_A_CORE
patient being seen for metabolic encephalopathy, uti and med management.    vitals reviewed    pe:  GENERAL: NAD,   NECK: soft,    CHEST/LUNG: Clear to auscultation bilaterally; No wheezing  HEART: S1S2+, Regular rate and rhythm; No murmurs  ABDOMEN: Soft, Nontender,   EXTREMITIES:  2+ Peripheral Pulses, No edema  SKIN: No rashes or lesions  NEURO: slightly more Awake, slight facial droop      a/p  1) toxic metabolic encephalopathy --> secondary to uti  -->completed abx  --> supportive care  --> at baseline    2) SDH --> neurosurgery consult appreciated  --> repeat ct performed and was stable   --> outpatient follow up     3) htn --> elevated  --> iv prn meds    4) drowsiness --> slighlty more awake  --> supportive care    5) vit d def --> vitamin d started       dispo --> dc postponed due to htn  dc tomorrow this note is for the day of 3/16/2018       patient being seen for metabolic encephalopathy, uti and med management.    vitals reviewed    pe:  GENERAL: NAD,   NECK: soft,    CHEST/LUNG: Clear to auscultation bilaterally; No wheezing  HEART: S1S2+, Regular rate and rhythm; No murmurs  ABDOMEN: Soft, Nontender,   EXTREMITIES:  2+ Peripheral Pulses, No edema  SKIN: No rashes or lesions  NEURO: slightly more Awake, slight facial droop      a/p  1) toxic metabolic encephalopathy --> secondary to uti  -->completed abx  --> supportive care  --> at baseline    2) SDH --> neurosurgery consult appreciated  --> repeat ct performed and was stable   --> outpatient follow up     3) htn --> elevated  --> iv prn meds    4) drowsiness --> slighlty more awake  --> supportive care    5) vit d def --> vitamin d started       dispo --> dc postponed due to htn  dc tomorrow

## 2018-03-19 NOTE — ED PROVIDER NOTE - OBJECTIVE STATEMENT
88 year old female presenting to the ED complaining of chest pain. Pt states that she is also having pain to her left lower extremity and difficulty breathing, but denies having any abdominal pain. Pt was recently admitted and dx with diagnosis for UTI/metabolic encephalotomy. No further complaints at this time.    : Pacific  ([id number]) 88 year old female presenting to the ED complaining of chest pain. Pt states that she is also having pain to her left lower extremity and difficulty breathing, but denies having any abdominal pain. PT. however shows no signs of SOB. Pt. is a poor historian. Pt. does not answer all questions. Pt was recently admitted and dx with diagnosis of SDH and UTI/metabolic encephalotomy. No further complaints at this time.  NO family at bedside.   : Pacific  ([id number])

## 2018-03-19 NOTE — ED PROVIDER NOTE - CONSTITUTIONAL, MLM
normal... In no acute distress. Poor historian. May be at baseline mental status. No family in ED currently. Not retracting. No respiratory distress.

## 2018-03-19 NOTE — ED PROVIDER NOTE - MEDICAL DECISION MAKING DETAILS
Pt with chest pain. Pt was recently discharged with diagnosis for UTI/metabolic encephalotomy. Will check labs and EKG. Will re-evaluate.

## 2018-03-19 NOTE — ED PROVIDER NOTE - PROGRESS NOTE DETAILS
PT. re-evaluate. NO family at beside. Unable to access if pt's chest pain is worst. Pt. appears very comfortable. NO diaphoresis. NO SOB. Cardiology consulted. Dr. Ross has accepted the patient for ACS. CAse discussed with Dr. Fleming. He will come to see the patient. Repeat EKG ordered. PT started spitting up after the plavix. PT. with rectal temp of 100.6.

## 2018-03-19 NOTE — H&P ADULT - RS GEN PE MLT RESP DETAILS PC
respirations non-labored/respirations labored/diminished breath sounds, L/diminished breath sounds, R

## 2018-03-19 NOTE — H&P ADULT - PROBLEM SELECTOR PLAN 2
likely due to demand ischemia  continue to trend  pt without active cp  hold AC due to recent SDH  s/p plavix loading dose

## 2018-03-19 NOTE — ED ADULT NURSE REASSESSMENT NOTE - NS ED NURSE REASSESS COMMENT FT1
Son at bedside, pt vomited after PO medication administration, MD Guo made aware, report given to night shift RN. pt in no apparent distress, son made ware of plan of care and son verbalized understanding
pt in no apparent distress, MD Guo made aware of pts elevated BP, awaiting further orders.
report received from previous shift RN Pt received sitting on stretcher in NAD. Pt alert, only speaks tagalog family at bedside to translate.  PERRLA. Lungs CTA, RR even unlabored. Ab soft non tender, + bowel sounds x 4quads. Denies Nausea, Vomiting, Diarrhea. Skin warm, dry, color appropriate for age and race. Dr. Mckeon at bedside aware of patients blood pressure, patient medicated with PO medication in apple sauce, patient on monitor awaiting bed will continue to monitor patient

## 2018-03-19 NOTE — ED ADULT TRIAGE NOTE - CHIEF COMPLAINT QUOTE
pt biba c/o pain to upper thigh and chest. pt speaks tagalog.  pt was recently in hospital but unsure why, no family with pt, ems reports family will come "later"

## 2018-03-19 NOTE — H&P ADULT - PROBLEM SELECTOR PLAN 6
give vancomycin and zosyn  f/u urine cx  urinalysis without findings consistent with UTI  f/u Chest ct, r/o PNA in setting of fever and c/o chills and subjective fever

## 2018-03-19 NOTE — CONSULT NOTE ADULT - SUBJECTIVE AND OBJECTIVE BOX
[place] [facility] - [facility], THE HEART CENTER                                   [address] [place] [address]                                                      PHONE: [phone]                                                         FAX: [phone]  [email or web address]  ---------------------------------------------------------------------------------------------------------------------------------    Reason for Consult: RADHA  CVC: Wilfr[place]  HPI:  SIMON BRISENO is an 88y Female PMHx SDH, HTN, HLD, Afib not on AC secondary to SDH,, Dementia but able to answer simple questions, DM, CVA, limited mobility, Severe AS admitted after visiting nurse states pt was not acting right.  In ER noted to have fever 100.6 and BL pleural effusions on CT scan. Pt is arrousable and able to answer simple questions but not able to give detailed history. Son at bedside.    PAST MEDICAL & SURGICAL HISTORY:  Oral phase dysphagia  Fall, initial encounter  SDH (subdural hematoma)  CVA (cerebral vascular accident)  Diabetes  Atrial fibrillation  High cholesterol  Hypertension  Delivered by  section  S/P tonsillectomy      No Known Drug Allergies  Shellfish (Rash)      MEDICATIONS  (STANDING):  acetaminophen  Suppository 650 milliGRAM(s) Rectal once  amLODIPine   Tablet 10 milliGRAM(s) Oral daily  aspirin enteric coated 81 milliGRAM(s) Oral daily  atorvastatin 10 milliGRAM(s) Oral at bedtime  dextrose 5%. 1000 milliLiter(s) (50 mL/Hr) IV Continuous <Continuous>  dextrose 50% Injectable 12.5 Gram(s) IV Push once  dextrose 50% Injectable 25 Gram(s) IV Push once  dextrose 50% Injectable 25 Gram(s) IV Push once  diltiazem    milliGRAM(s) Oral daily  enalapril 10 milliGRAM(s) Oral daily  hydrALAZINE 25 milliGRAM(s) Oral three times a day  insulin lispro (HumaLOG) corrective regimen sliding scale   SubCutaneous three times a day before meals  ondansetron Injectable 4 milliGRAM(s) IV Push once  piperacillin/tazobactam IVPB. 3.375 Gram(s) IV Intermittent every 8 hours  vancomycin  IVPB      vancomycin  IVPB 750 milliGRAM(s) IV Intermittent once    MEDICATIONS  (PRN):  acetaminophen   Tablet 650 milliGRAM(s) Oral every 6 hours PRN For Temp greater than 38 C (100.4 F)  acetaminophen   Tablet. 650 milliGRAM(s) Oral every 6 hours PRN Mild Pain (1 - 3)  dextrose Gel 1 Dose(s) Oral once PRN Blood Glucose LESS THAN 70 milliGRAM(s)/deciliter  glucagon  Injectable 1 milliGRAM(s) IntraMuscular once PRN Glucose LESS THAN 70 milligrams/deciliter  hydrALAZINE Injectable 10 milliGRAM(s) IV Push every 4 hours PRN SBP>190      Social History:  Cigarettes:     no               Alchohol:     no            Illicit Drug Abuse:  no  FHx NC  ROS: Negative other than as mentioned in HPI.    Vital Signs Last 24 Hrs  T(C): 36.3 (19 Mar 2018 17:49), Max: 36.3 (19 Mar 2018 17:49)  T(F): 97.4 (19 Mar 2018 17:49), Max: 97.4 (19 Mar 2018 17:49)  HR: 66 (19 Mar 2018 19:44) (66 - 95)  BP: 176/77 (19 Mar 2018 19:44) (176/77 - 240/79)  BP(mean): --  RR: 20 (19 Mar 2018 19:44) (16 - 20)  SpO2: 94% (19 Mar 2018 19:44) (94% - 99%)  ICU Vital Signs Last 24 Hrs  SIMON BRISENO  I&O's Detail    I&O's Summary    Drug Dosing Weight  SIMON BRISENO      PHYSICAL EXAM:  General: Appears well developed, well nourished alert and cooperative.  HEENT: Head; normocephalic, atraumatic.  Eyes: Pupils reactive, cornea wnl.  Neck: Supple, no nodes adenopathy, no NVD or carotid bruit or thyromegaly.  CARDIOVASCULAR: 3/6 systolic murmur, rub, gallop or lift.   LUNGS: No rales, rhonchi or wheeze. Normal breath sounds bilaterally.  ABDOMEN: Soft, nontender without mass or organomegaly. bowel sounds normoactive.  EXTREMITIES: No clubbing, cyanosis or edema. Distal pulses wnl.   SKIN: warm and dry with normal turgor.  NEURO: Alert   PSYCH: normal affect.        LABS:                        11.1   7.3   )-----------( 254      ( 19 Mar 2018 16:27 )             35.3     -    144  |  103  |  16.0  ----------------------------<  142<H>  3.5   |  25.0  |  0.83    Ca    8.8      19 Mar 2018 16:27    TPro  6.7  /  Alb  3.5  /  TBili  0.4  /  DBili  x   /  AST  21  /  ALT  9   /  AlkPhos  83      SIMON BRISENO  CARDIAC MARKERS ( 19 Mar 2018 16:27 )  x     / 0.09 ng/mL / x     / x     / x          PT/INR - ( 19 Mar 2018 16:27 )   PT: 11.4 sec;   INR: 1.04 ratio         PTT - ( 19 Mar 2018 16:27 )  PTT:30.6 sec  Urinalysis Basic - ( 19 Mar 2018 18:42 )    Color: Pale Yellow / Appearance: Clear / S.010 / pH: x  Gluc: x / Ketone: Negative  / Bili: Negative / Urobili: Negative mg/dL   Blood: x / Protein: 500 mg/dL / Nitrite: Negative   Leuk Esterase: Negative / RBC: 3-5 /HPF / WBC 0-2   Sq Epi: x / Non Sq Epi: Occasional / Bacteria: Occasional        RADIOLOGY & ADDITIONAL STUDIES:    INTERPRETATION OF TELEMETRY (personally reviewed): no events    ECG: NS @ 89 no acute ischemic changes      Assessment and Plan:  In summary, SIMON BRISENO is an 88y Female with past medical history significant for SDH, HTN, HLD, Afib not on AC secondary to SDH,, Dementia but able to answer simple questions, DM, CVA, limited mobility, Severe AS (no planned workup as poor baseline mental status) admitted after visiting nurse states pt was not acting right.  In ER noted to have fever 100.6 and BL pleural effusions on CT scan. Pt is arrousable and able to answer simple questions but not able to give detailed history. Son at bedside.    1) FU echo  2) Lasix 40mg IV 2x/day  3) If no intervention on valve then would get hospice involved

## 2018-03-19 NOTE — H&P ADULT - HISTORY OF PRESENT ILLNESS
Pt is an 89 yo Female with PMH Grade I diastolic dysfunction with EF 70%, Afib not on AC due to recent SDH, CVA, DMII, benign heart murmur, falls, frequent UTI, metabolic encephalopathy, UTI who is chronically colonized as per son, who is admitted to [facility] with HTN urgency after arriving with AMS/fever/sob/chest pain/leg pain after being BIBA by home visiting nurse who states she called because pt did not seem like herself. As per son, pt can usually speak and say her name/ at baseline and is ambulatory with assistance. Son is unsure if the patient took BP meds today. Son states pt is only c/o HA at this moment. Fever noted 100.6 in ED. Son states pt has dysphagia at baseline and has to be fully awake and have intention to swallow food/pills. Pt denies cp, n/v/d, abdominal pain, dizziness, visual changes.

## 2018-03-19 NOTE — CONSULT NOTE ADULT - SUBJECTIVE AND OBJECTIVE BOX
[place] [facility]                                                        Office: [address] [address]                                                       Telephone: [phone]. Fax:[phone]                                                              CARDIOLOGY CONSULTATION NOTE                                                                                             Consult requested by:      PCP    Primary Cardiologist.    Reason for Consultation:     History obtained by: Patient and medical record     obtained: No    Chief complaint:    Patient is a 88y old  Female who presents with a chief complaint of     HPI:        REVIEW OF SYSTEMS:  CONSTITUTIONAL:  as per HPI  HEENT:  Eyes:  No diplopia or blurred vision. ENT:  No earache, sore throat or runny nose.  CARDIOVASCULAR:  No pressure, squeezing, strangling, tightness, heaviness or aching about the chest, neck, axilla or epigastrium.  RESPIRATORY:  No cough, shortness of breath, PND or orthopnea.  GASTROINTESTINAL:  No nausea, vomiting or diarrhea.  GENITOURINARY:  No dysuria, frequency or urgency. No Blood in urine  MUSCULOSKELETAL:  no joint aches, no muscle pain  SKIN:  No change in skin, hair or nails.  NEUROLOGIC:  No paresthesias, fasciculations, seizures or weakness.  PSYCHIATRIC:  No disorder of thought or mood.  ENDOCRINE:  No heat or cold intolerance, polyuria or polydipsia.  HEMATOLOGICAL:  No easy bruising or bleeding.           	        ALLERGIES: Allergies    No Known Drug Allergies  Shellfish (Rash)    Intolerances          CURRENT MEDICATIONS:  hydrALAZINE Injectable 10 milliGRAM(s) IV Push every 4 hours PRN     dextrose 5%.  dextrose 50% Injectable  dextrose 50% Injectable  dextrose 50% Injectable  insulin lispro (HumaLOG) corrective regimen sliding scale      HOME MEDICATIONS:    PAST MEDICAL HISTORY  CVA (cerebral vascular accident)  Diabetes  Atrial fibrillation  High cholesterol  Hypertension      PAST SURGICAL HISTORY  Delivered by  section  S/P tonsillectomy  No significant past surgical history      FAMILY HISTORY:  No pertinent family history in first degree relatives      SOCIAL HISTORY:       CIGARETTES:       ALCOHOL    DRUG ABUSE    Vital Signs Last 24 Hrs  T(C): 36.3 (19 Mar 2018 17:49), Max: 36.3 (19 Mar 2018 17:49)  T(F): 97.4 (19 Mar 2018 17:49), Max: 97.4 (19 Mar 2018 17:49)  HR: 93 (19 Mar 2018 17:49) (86 - 95)  BP: 199/80 (19 Mar 2018 17:49) (189/77 - 240/79)  BP(mean): --  RR: 18 (19 Mar 2018 17:49) (16 - 18)  SpO2: 99% (19 Mar 2018 17:49) (99% - 99%)      PHYSICAL EXAM:  Constitutional: Comfortable . No acute distress.   HEENT: Atraumatic and normcephalic , neck is supple . no JVD. No carotid bruit. PEERL   CNS: A&Ox3. No focal deficits. EOMI. Cranial nerves II-IX are intact.   Lymph Nodes: Cervical : Not palpable.  Respiratory: CTAB  Cardiovascular: S1S2 RRR. No murmur/rubs or gallop.  Gastrointestinal: Soft non-tender and non distended . +Bowel sounds. negative Dumont's sign.  Extremities: No edema.   Psychiatric: Calm . no agitation.  Skin: No skin rash/ulcers visualized to face, hands or feet.    Intake and output:     LABS:                        11.1   7.3   )-----------( 254      ( 19 Mar 2018 16:27 )             35.3     03-19    144  |  103  |  16.0  ----------------------------<  142<H>  3.5   |  25.0  |  0.83    Ca    8.8      19 Mar 2018 16:27    TPro  6.7  /  Alb  3.5  /  TBili  0.4  /  DBili  x   /  AST  21  /  ALT  9   /  AlkPhos  83  03-19    CARDIAC MARKERS ( 19 Mar 2018 16:27 )  x     / 0.09 ng/mL / x     / x     / x        ;p-BNP=  PT/INR - ( 19 Mar 2018 16:27 )   PT: 11.4 sec;   INR: 1.04 ratio         PTT - ( 19 Mar 2018 16:27 )  PTT:30.6 sec  Urinalysis Basic - ( 19 Mar 2018 18:42 )    Color: Pale Yellow / Appearance: Clear / S.010 / pH: x  Gluc: x / Ketone: Negative  / Bili: Negative / Urobili: Negative mg/dL   Blood: x / Protein: 500 mg/dL / Nitrite: Negative   Leuk Esterase: Negative / RBC: x / WBC x   Sq Epi: x / Non Sq Epi: x / Bacteria: x        INTERPRETATION OF TELEMETRY: Reviewed by me.   ECG: Reviewed by me.  EKG #1- probably sinus rhythm, baseline artifact.      RADIOLOGY & ADDITIONAL STUDIES/ECHO/CARDIAC CATH:

## 2018-03-19 NOTE — H&P ADULT - PMH
Atrial fibrillation    CVA (cerebral vascular accident)    Diabetes    Fall, initial encounter    High cholesterol    Hypertension    Oral phase dysphagia    SDH (subdural hematoma)

## 2018-03-19 NOTE — H&P ADULT - ASSESSMENT
87 yo female with pmh/o afib, SDH, HTN, DMII, who is admitted with HTN urgency and troponemia likely secondary to demand ischemia. Pt also noted to be SOB and febrile and son reports h/o dysphagia, this is concerning for PNA.

## 2018-03-19 NOTE — ED ADULT NURSE NOTE - OBJECTIVE STATEMENT
pt AOX2 poor historian MD Guo at bedside with pacific  ID: [id number]. No family at bedside, pt dc for urosepsis. pt stating she has chest pain and leg pain unsure which leg and unable to get more history from pt.

## 2018-03-20 LAB
ALBUMIN SERPL ELPH-MCNC: 3.1 G/DL — LOW (ref 3.3–5.2)
ALP SERPL-CCNC: 73 U/L — SIGNIFICANT CHANGE UP (ref 40–120)
ALT FLD-CCNC: 8 U/L — SIGNIFICANT CHANGE UP
ANION GAP SERPL CALC-SCNC: 13 MMOL/L — SIGNIFICANT CHANGE UP (ref 5–17)
AST SERPL-CCNC: 21 U/L — SIGNIFICANT CHANGE UP
BASOPHILS # BLD AUTO: 0 K/UL — SIGNIFICANT CHANGE UP (ref 0–0.2)
BASOPHILS NFR BLD AUTO: 0.3 % — SIGNIFICANT CHANGE UP (ref 0–2)
BILIRUB SERPL-MCNC: 0.6 MG/DL — SIGNIFICANT CHANGE UP (ref 0.4–2)
BUN SERPL-MCNC: 15 MG/DL — SIGNIFICANT CHANGE UP (ref 8–20)
CALCIUM SERPL-MCNC: 8.6 MG/DL — SIGNIFICANT CHANGE UP (ref 8.6–10.2)
CHLORIDE SERPL-SCNC: 100 MMOL/L — SIGNIFICANT CHANGE UP (ref 98–107)
CO2 SERPL-SCNC: 25 MMOL/L — SIGNIFICANT CHANGE UP (ref 22–29)
CREAT SERPL-MCNC: 0.85 MG/DL — SIGNIFICANT CHANGE UP (ref 0.5–1.3)
CULTURE RESULTS: NO GROWTH — SIGNIFICANT CHANGE UP
EOSINOPHIL # BLD AUTO: 0 K/UL — SIGNIFICANT CHANGE UP (ref 0–0.5)
EOSINOPHIL NFR BLD AUTO: 0.1 % — SIGNIFICANT CHANGE UP (ref 0–6)
GLUCOSE BLDC GLUCOMTR-MCNC: 103 MG/DL — HIGH (ref 70–99)
GLUCOSE BLDC GLUCOMTR-MCNC: 110 MG/DL — HIGH (ref 70–99)
GLUCOSE BLDC GLUCOMTR-MCNC: 111 MG/DL — HIGH (ref 70–99)
GLUCOSE BLDC GLUCOMTR-MCNC: 62 MG/DL — LOW (ref 70–99)
GLUCOSE SERPL-MCNC: 118 MG/DL — HIGH (ref 70–115)
HCT VFR BLD CALC: 32 % — LOW (ref 37–47)
HGB BLD-MCNC: 10.3 G/DL — LOW (ref 12–16)
LYMPHOCYTES # BLD AUTO: 1 K/UL — SIGNIFICANT CHANGE UP (ref 1–4.8)
LYMPHOCYTES # BLD AUTO: 13.5 % — LOW (ref 20–55)
MCHC RBC-ENTMCNC: 27.2 PG — SIGNIFICANT CHANGE UP (ref 27–31)
MCHC RBC-ENTMCNC: 32.2 G/DL — SIGNIFICANT CHANGE UP (ref 32–36)
MCV RBC AUTO: 84.4 FL — SIGNIFICANT CHANGE UP (ref 81–99)
MONOCYTES # BLD AUTO: 0.9 K/UL — HIGH (ref 0–0.8)
MONOCYTES NFR BLD AUTO: 12.4 % — HIGH (ref 3–10)
NEUTROPHILS # BLD AUTO: 5.3 K/UL — SIGNIFICANT CHANGE UP (ref 1.8–8)
NEUTROPHILS NFR BLD AUTO: 73.4 % — HIGH (ref 37–73)
PLATELET # BLD AUTO: 232 K/UL — SIGNIFICANT CHANGE UP (ref 150–400)
POTASSIUM SERPL-MCNC: 3.2 MMOL/L — LOW (ref 3.5–5.3)
POTASSIUM SERPL-SCNC: 3.2 MMOL/L — LOW (ref 3.5–5.3)
PROT SERPL-MCNC: 6.2 G/DL — LOW (ref 6.6–8.7)
RBC # BLD: 3.79 M/UL — LOW (ref 4.4–5.2)
RBC # FLD: 14 % — SIGNIFICANT CHANGE UP (ref 11–15.6)
SODIUM SERPL-SCNC: 138 MMOL/L — SIGNIFICANT CHANGE UP (ref 135–145)
SPECIMEN SOURCE: SIGNIFICANT CHANGE UP
TROPONIN T SERPL-MCNC: 0.16 NG/ML — HIGH (ref 0–0.06)
VANCOMYCIN TROUGH SERPL-MCNC: 18.9 UG/ML — SIGNIFICANT CHANGE UP (ref 10–20)
WBC # BLD: 7.2 K/UL — SIGNIFICANT CHANGE UP (ref 4.8–10.8)
WBC # FLD AUTO: 7.2 K/UL — SIGNIFICANT CHANGE UP (ref 4.8–10.8)

## 2018-03-20 PROCEDURE — 99233 SBSQ HOSP IP/OBS HIGH 50: CPT

## 2018-03-20 RX ORDER — POTASSIUM CHLORIDE 20 MEQ
10 PACKET (EA) ORAL EVERY 4 HOURS
Qty: 0 | Refills: 0 | Status: COMPLETED | OUTPATIENT
Start: 2018-03-20 | End: 2018-03-20

## 2018-03-20 RX ADMIN — Medication 150 MILLIGRAM(S): at 20:44

## 2018-03-20 RX ADMIN — Medication 100 MILLIEQUIVALENT(S): at 10:45

## 2018-03-20 RX ADMIN — Medication 40 MILLIGRAM(S): at 18:08

## 2018-03-20 RX ADMIN — Medication 150 MILLIGRAM(S): at 06:17

## 2018-03-20 RX ADMIN — ATORVASTATIN CALCIUM 10 MILLIGRAM(S): 80 TABLET, FILM COATED ORAL at 21:57

## 2018-03-20 RX ADMIN — Medication 180 MILLIGRAM(S): at 06:08

## 2018-03-20 RX ADMIN — PIPERACILLIN AND TAZOBACTAM 25 GRAM(S): 4; .5 INJECTION, POWDER, LYOPHILIZED, FOR SOLUTION INTRAVENOUS at 00:46

## 2018-03-20 RX ADMIN — Medication 25 MILLIGRAM(S): at 06:08

## 2018-03-20 RX ADMIN — Medication 650 MILLIGRAM(S): at 03:39

## 2018-03-20 RX ADMIN — Medication 650 MILLIGRAM(S): at 04:00

## 2018-03-20 RX ADMIN — PIPERACILLIN AND TAZOBACTAM 25 GRAM(S): 4; .5 INJECTION, POWDER, LYOPHILIZED, FOR SOLUTION INTRAVENOUS at 21:57

## 2018-03-20 RX ADMIN — PIPERACILLIN AND TAZOBACTAM 25 GRAM(S): 4; .5 INJECTION, POWDER, LYOPHILIZED, FOR SOLUTION INTRAVENOUS at 16:29

## 2018-03-20 RX ADMIN — Medication 10 MILLIGRAM(S): at 06:08

## 2018-03-20 RX ADMIN — AMLODIPINE BESYLATE 10 MILLIGRAM(S): 2.5 TABLET ORAL at 06:08

## 2018-03-20 RX ADMIN — Medication 100 MILLIEQUIVALENT(S): at 05:15

## 2018-03-20 RX ADMIN — PIPERACILLIN AND TAZOBACTAM 25 GRAM(S): 4; .5 INJECTION, POWDER, LYOPHILIZED, FOR SOLUTION INTRAVENOUS at 07:04

## 2018-03-20 RX ADMIN — Medication 40 MILLIGRAM(S): at 06:08

## 2018-03-20 RX ADMIN — Medication 25 MILLIGRAM(S): at 21:57

## 2018-03-20 NOTE — PROGRESS NOTE ADULT - ASSESSMENT
Assessment  dyspnea, c/w diastolic HF  severe AS with normal EF mod AI  min elevated trop, ? demand ischemia, no acute ECG changes  dementia  htn    Rec  cont current meds  FU echo  in light of baseline mental status pt not candidate for TAVR or further cardiac intervention

## 2018-03-20 NOTE — PROGRESS NOTE ADULT - SUBJECTIVE AND OBJECTIVE BOX
[place] [facility] - [facility], THE HEART CENTER                                   [address] [place] [address]                                                      PHONE: [phone]                                                         FAX: [phone]  [email or web address]  ---------------------------------------------------------------------------------------------------------------------------------    Overnight events/patient complaints: pt not responding to questions, in fetal position, refusing full exam, lying flat comfortably      No Known Drug Allergies  Shellfish (Rash)    MEDICATIONS  (STANDING):  acetaminophen  Suppository 650 milliGRAM(s) Rectal once  amLODIPine   Tablet 10 milliGRAM(s) Oral daily  aspirin enteric coated 81 milliGRAM(s) Oral daily  atorvastatin 10 milliGRAM(s) Oral at bedtime  dextrose 5%. 1000 milliLiter(s) (50 mL/Hr) IV Continuous <Continuous>  dextrose 50% Injectable 12.5 Gram(s) IV Push once  dextrose 50% Injectable 25 Gram(s) IV Push once  dextrose 50% Injectable 25 Gram(s) IV Push once  diltiazem    milliGRAM(s) Oral daily  enalapril 10 milliGRAM(s) Oral daily  furosemide   Injectable 40 milliGRAM(s) IV Push two times a day  hydrALAZINE 25 milliGRAM(s) Oral three times a day  insulin lispro (HumaLOG) corrective regimen sliding scale   SubCutaneous three times a day before meals  ondansetron Injectable 4 milliGRAM(s) IV Push once  piperacillin/tazobactam IVPB. 3.375 Gram(s) IV Intermittent every 8 hours  potassium chloride  10 mEq/100 mL IVPB 10 milliEquivalent(s) IV Intermittent every 4 hours  vancomycin  IVPB      vancomycin  IVPB 750 milliGRAM(s) IV Intermittent every 12 hours    MEDICATIONS  (PRN):  acetaminophen   Tablet 650 milliGRAM(s) Oral every 6 hours PRN For Temp greater than 38 C (100.4 F)  acetaminophen   Tablet. 650 milliGRAM(s) Oral every 6 hours PRN Mild Pain (1 - 3)  dextrose Gel 1 Dose(s) Oral once PRN Blood Glucose LESS THAN 70 milliGRAM(s)/deciliter  glucagon  Injectable 1 milliGRAM(s) IntraMuscular once PRN Glucose LESS THAN 70 milligrams/deciliter  hydrALAZINE Injectable 10 milliGRAM(s) IV Push every 4 hours PRN SBP>190      Vital Signs Last 24 Hrs  T(C): 36.9 (20 Mar 2018 06:05), Max: 37.3 (19 Mar 2018 22:05)  T(F): 98.4 (20 Mar 2018 06:05), Max: 99.2 (20 Mar 2018 03:40)  HR: 60 (20 Mar 2018 06:05) (60 - 95)  BP: 140/60 (20 Mar 2018 06:05) (102/65 - 240/79)  BP(mean): --  RR: 18 (20 Mar 2018 06:05) (16 - 22)  SpO2: 97% (20 Mar 2018 06:05) (94% - 99%)  Daily Height in cm: 149.86 (19 Mar 2018 15:22)    Daily Weight in k.3 (20 Mar 2018 03:40)  ICU Vital Signs Last 24 Hrs  SIMON SUZANNA  I&O's Detail    I&O's Summary    Drug Dosing Weight  [facility]      PHYSICAL EXAM:  General: Appears well developed, well nourished alert and cooperative.  HEENT: Head; normocephalic, atraumatic.  Eyes: Pupils reactive, cornea wnl.  Neck: Supple, no nodes adenopathy, no NVD or carotid bruit or thyromegaly.  CARDIOVASCULAR: Normal J0xiidvu S2 3/6 systolic murmur at base, No murmur, rub, gallop or lift.   LUNGS: No rales, rhonchi or wheeze. Normal breath sounds bilaterally.  ABDOMEN: Soft, nontender without mass or organomegaly. bowel sounds normoactive.  EXTREMITIES: No clubbing, cyanosis or edema. Distal pulses wnl.   SKIN: warm and dry with normal turgor.  NEURO: Alert/oriented x 3/normal motor exam. No pathologic reflexes.    PSYCH: normal affect.        LABS:                        10.3   7.2   )-----------( 232      ( 20 Mar 2018 04:11 )             32.0     03-20    138  |  100  |  15.0  ----------------------------<  118<H>  3.2<L>   |  25.0  |  0.85    Ca    8.6      20 Mar 2018 04:11    TPro  6.2<L>  /  Alb  3.1<L>  /  TBili  0.6  /  DBili  x   /  AST  21  /  ALT  8   /  AlkPhos  73  03-20    SIMON SUZANNA  CARDIAC MARKERS ( 20 Mar 2018 04:12 )  x     / 0.16 ng/mL / x     / x     / x      CARDIAC MARKERS ( 19 Mar 2018 16:27 )  x     / 0.09 ng/mL / x     / x     / x          PT/INR - ( 19 Mar 2018 16:27 )   PT: 11.4 sec;   INR: 1.04 ratio         PTT - ( 19 Mar 2018 16:27 )  PTT:30.6 sec  Urinalysis Basic - ( 19 Mar 2018 18:42 )    Color: Pale Yellow / Appearance: Clear / S.010 / pH: x  Gluc: x / Ketone: Negative  / Bili: Negative / Urobili: Negative mg/dL   Blood: x / Protein: 500 mg/dL / Nitrite: Negative   Leuk Esterase: Negative / RBC: 3-5 /HPF / WBC 0-2   Sq Epi: x / Non Sq Epi: Occasional / Bacteria: Occasional        RADIOLOGY & ADDITIONAL STUDIES:    INTERPRETATION OF TELEMETRY (personally reviewed):    ECG: NSR LVH no acute changes    ECHO:< from: TTE Echo Complete w/Doppler (16 @ 14:16) >    IMPRESSION: Summary:   1. Technically fair study.   2. There is mild concentric left ventricular hypertrophy.   3. Normal global left ventricular systolic function.   4. Left ventricular ejection fraction, by visual estimation, is 70 to   75%. LVEF by biplane MOD is 72%.   5. Spectral Doppler shows impaired relaxation pattern of left   ventricular myocardial filling (Grade I diastolic dysfunction).   6. Elevated left atrial and left ventricular end-diastolic pressures.   LVEDP estimated at 30 mmHg.   7. Severe mitral annular calcification.   8. Thickening and calcification of the anterior and posterior mitral   valve leaflets.   9. Moderate aortic regurgitation.  10. Severe aortic valve stenosis.  11. Peak transaortic gradient equals 67.7 mmHg, mean transaortic gradient   equals 33.3 mmHg, the calculated aortic valve area equals 0.72 cm² by the   continuity equation consistent with severe aortic stenosis.  12. Mild mitral valve regurgitation.  13. There is no evidence of pericardial effusion.     MD Som Electronically signed on 9/3/2016 at 6:11:38 PM               DELROY ELISE   This document has been electronically signed. Sep  3 2016  6:12P    < end of copied text >      < from: Xray Chest 1 View AP/PA. (18 @ 18:45) >    IMPRESSION:   Cardiomegaly .  The right upper lobe infiltrate versus mass which is new compared to   < from: CT Chest No Cont (18 @ 20:50) >    IMPRESSION:    Moderate right pleural effusion . mild left pleural effusion with a left   lower lobe dependent atelectasis .      Gross cardiomegaly with moderate pericardial effusion .   Coronary artery, calcified mitral annulus and calcified plaques    throughout thoracic aorta without focal aneurysm.                 TAI MCKEON M.D., ATTENDING RADIOLOGIST  This document has been electronically signed. Mar 19 2018  8:58PM        < end of copied text >  9/3/2016 examination. Follow-up CT scan recommended. Mass measures 2.3   cm..                    < end of copied text >

## 2018-03-20 NOTE — PROGRESS NOTE ADULT - SUBJECTIVE AND OBJECTIVE BOX
SIMON BRISENO  ----------------------------------------  The patient was seen and evaluated for CHF.  The patient is in no acute distress.  Appears comfortable but disoriented.    Vital Signs Last 24 Hrs  T(C): 36.7 (20 Mar 2018 10:37), Max: 37.3 (19 Mar 2018 22:05)  T(F): 98.1 (20 Mar 2018 10:37), Max: 99.2 (20 Mar 2018 03:40)  HR: 55 (20 Mar 2018 10:37) (55 - 95)  BP: 105/54 (20 Mar 2018 10:37) (102/65 - 240/79)  BP(mean): --  RR: 18 (20 Mar 2018 10:37) (16 - 22)  SpO2: 98% (20 Mar 2018 10:37) (94% - 99%)    CAPILLARY BLOOD GLUCOSE  POCT Blood Glucose.: 103 mg/dL (20 Mar 2018 12:39)  POCT Blood Glucose.: 62 mg/dL (20 Mar 2018 08:25)    PHYSICAL EXAMINATION:  ----------------------------------------  General appearance: NAD, Awake, Alert  HEENT: NCAT, Conjunctiva clear, EOMI  Neck: Supple, No JVD, No tenderness  Lungs: Clear to auscultation, Breath sound equal bilaterally, Decreased breath sounds at the bases, No wheezes, No rales  Cardiovascular: S1S2, Regular rhythm  Abdomen: Soft, Nontender, Nondistended, No guarding/rebound, Positive bowel sounds  Extremities: No clubbing, No cyanosis, No edema, No calf tenderness  Neuro: Strength equal bilaterally, No tremors  Psychiatric: Appropriate mood, Normal affect    LABORATORY STUDIES:  ----------------------------------------             10.3   7.2   )-----------( 232      ( 20 Mar 2018 04:11 )             32.0     03-20    138  |  100  |  15.0  ----------------------------<  118<H>  3.2<L>   |  25.0  |  0.85    Ca    8.6      20 Mar 2018 04:11    TPro  6.2<L>  /  Alb  3.1<L>  /  TBili  0.6  /  DBili  x   /  AST  21  /  ALT  8   /  AlkPhos  73  03-20    LIVER FUNCTIONS - ( 20 Mar 2018 04:11 )  Alb: 3.1 g/dL / Pro: 6.2 g/dL / ALK PHOS: 73 U/L / ALT: 8 U/L / AST: 21 U/L / GGT: x           PT/INR - ( 19 Mar 2018 16:27 )   PT: 11.4 sec;   INR: 1.04 ratio    PTT - ( 19 Mar 2018 16:27 )  PTT:30.6 sec    CARDIAC MARKERS ( 20 Mar 2018 04:12 )  x     / 0.16 ng/mL / x     / x     / x      CARDIAC MARKERS ( 19 Mar 2018 16:27 )  x     / 0.09 ng/mL / x     / x     / x        Urinalysis Basic - ( 19 Mar 2018 18:42 )  Color: Pale Yellow / Appearance: Clear / S.010 / pH: x  Gluc: x / Ketone: Negative  / Bili: Negative / Urobili: Negative mg/dL   Blood: x / Protein: 500 mg/dL / Nitrite: Negative   Leuk Esterase: Negative / RBC: 3-5 /HPF / WBC 0-2   Sq Epi: x / Non Sq Epi: Occasional / Bacteria: Occasional    MEDICATIONS  (STANDING):  acetaminophen  Suppository 650 milliGRAM(s) Rectal once  amLODIPine   Tablet 10 milliGRAM(s) Oral daily  aspirin enteric coated 81 milliGRAM(s) Oral daily  atorvastatin 10 milliGRAM(s) Oral at bedtime  dextrose 5%. 1000 milliLiter(s) (50 mL/Hr) IV Continuous <Continuous>  dextrose 50% Injectable 12.5 Gram(s) IV Push once  dextrose 50% Injectable 25 Gram(s) IV Push once  dextrose 50% Injectable 25 Gram(s) IV Push once  diltiazem    milliGRAM(s) Oral daily  enalapril 10 milliGRAM(s) Oral daily  furosemide   Injectable 40 milliGRAM(s) IV Push two times a day  hydrALAZINE 25 milliGRAM(s) Oral three times a day  insulin lispro (HumaLOG) corrective regimen sliding scale   SubCutaneous three times a day before meals  ondansetron Injectable 4 milliGRAM(s) IV Push once  piperacillin/tazobactam IVPB. 3.375 Gram(s) IV Intermittent every 8 hours  vancomycin  IVPB      vancomycin  IVPB 750 milliGRAM(s) IV Intermittent every 12 hours    MEDICATIONS  (PRN):  acetaminophen   Tablet 650 milliGRAM(s) Oral every 6 hours PRN For Temp greater than 38 C (100.4 F)  acetaminophen   Tablet. 650 milliGRAM(s) Oral every 6 hours PRN Mild Pain (1 - 3)  dextrose Gel 1 Dose(s) Oral once PRN Blood Glucose LESS THAN 70 milliGRAM(s)/deciliter  glucagon  Injectable 1 milliGRAM(s) IntraMuscular once PRN Glucose LESS THAN 70 milligrams/deciliter  hydrALAZINE Injectable 10 milliGRAM(s) IV Push every 4 hours PRN SBP>190      ASSESSMENT / PLAN:  ----------------------------------------  Hypertensive urgency - Blood pressure improved. On telemetry.    Diastolic heart failure -     ·  Problem: SDH (subdural hematoma).  Plan: CT head ordered urgent.      Problem/Plan - 4:  ·  Problem: Type 2 diabetes mellitus without complication, without long-term current use of insulin.  Plan: HISS  hold metformin.      Problem/Plan - 5:  ·  Problem: High cholesterol.  Plan: cont atorvastatin.      Problem/Plan - 6:  Problem: Fever, unspecified fever cause. Plan: give vancomycin and zosyn  f/u urine cx  urinalysis without findings consistent with UTI  f/u Chest ct, r/o PNA in setting of fever and c/o chills and subjective fever.     Problem/Plan - 7:  ·  Problem: Chronic atrial fibrillation.  Plan: cont aspirin  pt rate controlled  cont cardizem.        Attending Statement:  30 min time spent discussing advanced care planning including code status, existence of health care proxy, plan of treatment, consultants if called, and prognosis. Family and pt in agreement with above, all questions answered and concerns addressed.     80 minutes spent on total encounter; more than 50% of the visit was spent counseling and/or coordinating care by the attending physician.     Plan discussed with Son and ED RN.    Electronic Signatures for Addendum Section:   Megan Mckeon) (Signed Addendum 19-Mar-2018 22:14)  	CT chest findings discussed with son and patient at bedside. Son refusing CT surgery consult and is opting for conservative management at this time with abx and diuretics. Findings of moderate pleural effusion discussed and management discussed including recs for CT surgery consult for chest tube drainage and testing of fluid for infection. Risk of worsening of SOB, tachypnea and hypoxia discussed with son who still is opting for conservative approach and states he will let us know in AM if he changes mind. Son would like to be called regarding any changes in status overnight. SIMON BRISENO  ----------------------------------------  The patient was seen and evaluated for CHF.  The patient is in no acute distress.  Appears comfortable but disoriented.    Vital Signs Last 24 Hrs  T(C): 36.7 (20 Mar 2018 10:37), Max: 37.3 (19 Mar 2018 22:05)  T(F): 98.1 (20 Mar 2018 10:37), Max: 99.2 (20 Mar 2018 03:40)  HR: 55 (20 Mar 2018 10:37) (55 - 95)  BP: 105/54 (20 Mar 2018 10:37) (102/65 - 240/79)  BP(mean): --  RR: 18 (20 Mar 2018 10:37) (16 - 22)  SpO2: 98% (20 Mar 2018 10:37) (94% - 99%)    CAPILLARY BLOOD GLUCOSE  POCT Blood Glucose.: 103 mg/dL (20 Mar 2018 12:39)  POCT Blood Glucose.: 62 mg/dL (20 Mar 2018 08:25)    PHYSICAL EXAMINATION:  ----------------------------------------  General appearance: NAD, Awake, Alert  HEENT: NCAT, Conjunctiva clear, EOMI  Neck: Supple, No JVD, No tenderness  Lungs: Clear to auscultation, Breath sound equal bilaterally, Decreased breath sounds at the bases, No wheezes, No rales  Cardiovascular: S1S2, Regular rhythm  Abdomen: Soft, Nontender, Nondistended, No guarding/rebound, Positive bowel sounds  Extremities: No clubbing, No cyanosis, No edema, No calf tenderness  Neuro: Strength equal bilaterally, No tremors  Psychiatric: Appropriate mood, Normal affect    LABORATORY STUDIES:  ----------------------------------------             10.3   7.2   )-----------( 232      ( 20 Mar 2018 04:11 )             32.0     03-20    138  |  100  |  15.0  ----------------------------<  118<H>  3.2<L>   |  25.0  |  0.85    Ca    8.6      20 Mar 2018 04:11    TPro  6.2<L>  /  Alb  3.1<L>  /  TBili  0.6  /  DBili  x   /  AST  21  /  ALT  8   /  AlkPhos  73  03-20    LIVER FUNCTIONS - ( 20 Mar 2018 04:11 )  Alb: 3.1 g/dL / Pro: 6.2 g/dL / ALK PHOS: 73 U/L / ALT: 8 U/L / AST: 21 U/L / GGT: x           PT/INR - ( 19 Mar 2018 16:27 )   PT: 11.4 sec;   INR: 1.04 ratio    PTT - ( 19 Mar 2018 16:27 )  PTT:30.6 sec    CARDIAC MARKERS ( 20 Mar 2018 04:12 )  x     / 0.16 ng/mL / x     / x     / x      CARDIAC MARKERS ( 19 Mar 2018 16:27 )  x     / 0.09 ng/mL / x     / x     / x        Urinalysis Basic - ( 19 Mar 2018 18:42 )  Color: Pale Yellow / Appearance: Clear / S.010 / pH: x  Gluc: x / Ketone: Negative  / Bili: Negative / Urobili: Negative mg/dL   Blood: x / Protein: 500 mg/dL / Nitrite: Negative   Leuk Esterase: Negative / RBC: 3-5 /HPF / WBC 0-2   Sq Epi: x / Non Sq Epi: Occasional / Bacteria: Occasional    MEDICATIONS  (STANDING):  acetaminophen  Suppository 650 milliGRAM(s) Rectal once  amLODIPine   Tablet 10 milliGRAM(s) Oral daily  aspirin enteric coated 81 milliGRAM(s) Oral daily  atorvastatin 10 milliGRAM(s) Oral at bedtime  dextrose 5%. 1000 milliLiter(s) (50 mL/Hr) IV Continuous <Continuous>  dextrose 50% Injectable 12.5 Gram(s) IV Push once  dextrose 50% Injectable 25 Gram(s) IV Push once  dextrose 50% Injectable 25 Gram(s) IV Push once  diltiazem    milliGRAM(s) Oral daily  enalapril 10 milliGRAM(s) Oral daily  furosemide   Injectable 40 milliGRAM(s) IV Push two times a day  hydrALAZINE 25 milliGRAM(s) Oral three times a day  insulin lispro (HumaLOG) corrective regimen sliding scale   SubCutaneous three times a day before meals  ondansetron Injectable 4 milliGRAM(s) IV Push once  piperacillin/tazobactam IVPB. 3.375 Gram(s) IV Intermittent every 8 hours  vancomycin  IVPB      vancomycin  IVPB 750 milliGRAM(s) IV Intermittent every 12 hours    MEDICATIONS  (PRN):  acetaminophen   Tablet 650 milliGRAM(s) Oral every 6 hours PRN For Temp greater than 38 C (100.4 F)  acetaminophen   Tablet. 650 milliGRAM(s) Oral every 6 hours PRN Mild Pain (1 - 3)  dextrose Gel 1 Dose(s) Oral once PRN Blood Glucose LESS THAN 70 milliGRAM(s)/deciliter  glucagon  Injectable 1 milliGRAM(s) IntraMuscular once PRN Glucose LESS THAN 70 milligrams/deciliter  hydrALAZINE Injectable 10 milliGRAM(s) IV Push every 4 hours PRN SBP>190      ASSESSMENT / PLAN:  ----------------------------------------  Hypertensive urgency - Blood pressure improved. On telemetry.    Diastolic heart failure - On furosemide and enalapril.    Aortic stenosis - Sever aortic stenosis noted on the prior echocardiogram. Cardiology consultation noted, poor candidate for TAVR.    Diabetes - Insulin coverage, close monitoring of blood glucose levels.    Atrial fibrillation - On diltiazem.    Fever - Afebrile and without leukocytosis. Urinalysis was not suggestive of infection. CT of the chest noted a pleural effusion. If no obvious source of infection is noted, to discontinue antibiotics and monitor clinically.

## 2018-03-21 ENCOUNTER — TRANSCRIPTION ENCOUNTER (OUTPATIENT)
Age: 83
End: 2018-03-21

## 2018-03-21 VITALS — TEMPERATURE: 100 F

## 2018-03-21 LAB
GLUCOSE BLDC GLUCOMTR-MCNC: 110 MG/DL — HIGH (ref 70–99)
GLUCOSE BLDC GLUCOMTR-MCNC: 98 MG/DL — SIGNIFICANT CHANGE UP (ref 70–99)

## 2018-03-21 PROCEDURE — 81001 URINALYSIS AUTO W/SCOPE: CPT

## 2018-03-21 PROCEDURE — 71250 CT THORAX DX C-: CPT

## 2018-03-21 PROCEDURE — 80053 COMPREHEN METABOLIC PANEL: CPT

## 2018-03-21 PROCEDURE — 85610 PROTHROMBIN TIME: CPT

## 2018-03-21 PROCEDURE — 93971 EXTREMITY STUDY: CPT

## 2018-03-21 PROCEDURE — 80202 ASSAY OF VANCOMYCIN: CPT

## 2018-03-21 PROCEDURE — 85730 THROMBOPLASTIN TIME PARTIAL: CPT

## 2018-03-21 PROCEDURE — 70450 CT HEAD/BRAIN W/O DYE: CPT

## 2018-03-21 PROCEDURE — 84484 ASSAY OF TROPONIN QUANT: CPT

## 2018-03-21 PROCEDURE — 85027 COMPLETE CBC AUTOMATED: CPT

## 2018-03-21 PROCEDURE — 87086 URINE CULTURE/COLONY COUNT: CPT

## 2018-03-21 PROCEDURE — 82962 GLUCOSE BLOOD TEST: CPT

## 2018-03-21 PROCEDURE — 93306 TTE W/DOPPLER COMPLETE: CPT

## 2018-03-21 PROCEDURE — 99239 HOSP IP/OBS DSCHRG MGMT >30: CPT

## 2018-03-21 PROCEDURE — 71045 X-RAY EXAM CHEST 1 VIEW: CPT

## 2018-03-21 PROCEDURE — 96374 THER/PROPH/DIAG INJ IV PUSH: CPT

## 2018-03-21 PROCEDURE — 99285 EMERGENCY DEPT VISIT HI MDM: CPT | Mod: 25

## 2018-03-21 PROCEDURE — 36415 COLL VENOUS BLD VENIPUNCTURE: CPT

## 2018-03-21 RX ORDER — ASPIRIN/CALCIUM CARB/MAGNESIUM 324 MG
1 TABLET ORAL
Qty: 0 | Refills: 0 | COMMUNITY
Start: 2018-03-21

## 2018-03-21 RX ORDER — FUROSEMIDE 40 MG
1 TABLET ORAL
Qty: 30 | Refills: 0 | OUTPATIENT
Start: 2018-03-21 | End: 2018-04-19

## 2018-03-21 RX ADMIN — Medication 180 MILLIGRAM(S): at 06:14

## 2018-03-21 RX ADMIN — AMLODIPINE BESYLATE 10 MILLIGRAM(S): 2.5 TABLET ORAL at 06:14

## 2018-03-21 RX ADMIN — Medication 25 MILLIGRAM(S): at 15:06

## 2018-03-21 RX ADMIN — PIPERACILLIN AND TAZOBACTAM 25 GRAM(S): 4; .5 INJECTION, POWDER, LYOPHILIZED, FOR SOLUTION INTRAVENOUS at 06:14

## 2018-03-21 RX ADMIN — Medication 10 MILLIGRAM(S): at 06:14

## 2018-03-21 RX ADMIN — Medication 40 MILLIGRAM(S): at 06:14

## 2018-03-21 RX ADMIN — Medication 81 MILLIGRAM(S): at 12:21

## 2018-03-21 RX ADMIN — Medication 150 MILLIGRAM(S): at 04:55

## 2018-03-21 RX ADMIN — Medication 25 MILLIGRAM(S): at 06:14

## 2018-03-21 NOTE — DISCHARGE NOTE ADULT - CARE PROVIDER_API CALL
Babak Anna), Cardiovascular Disease; Critical Care Medicine; Internal Medicine  [address] [place] [address]  Phone: [phone]  Fax: [phone]

## 2018-03-21 NOTE — DISCHARGE NOTE ADULT - PLAN OF CARE
. Continue on the cardiac medications. Follow up with your primary care physician and cardiologist for further management. Continue on your home diabetes medications. Monitor glucose levels closely. Continue on diltiazem.

## 2018-03-21 NOTE — CDI QUERY NOTE - NSCDICHFTXTBX_GEN_ALL_CORE_HH
a/w SOB and hypertensive urgency, found to have elevated trops secondary to demand ischemia.  Hx of diastolic HF, b/l pleural effusions on CT. Lasix 40 mg IV q12h.    Please clarify the acuity of diastolic CHF in progress notes.

## 2018-03-21 NOTE — PROGRESS NOTE ADULT - ASSESSMENT
Assessment  dyspnea, c/w diastolic HF  severe AS with normal EF mod AI  min elevated trop, ? demand ischemia, no acute ECG changes  dementia  htn    Rec    cont current meds  in light of baseline mental status pt not candidate for TAVR or further cardiac intervention  Aspiration precautions  Palliative care

## 2018-03-21 NOTE — PROGRESS NOTE ADULT - SUBJECTIVE AND OBJECTIVE BOX
SIMON BRISENO  ----------------------------------------  The patient was seen and evaluated for hypertensive urgency.  The patient is in no acute distress.  Disoriented, appears comfortable.    Vital Signs Last 24 Hrs  T(C): 37 (21 Mar 2018 06:10), Max: 37 (21 Mar 2018 06:10)  T(F): 98.6 (21 Mar 2018 06:10), Max: 98.6 (21 Mar 2018 06:10)  HR: 70 (21 Mar 2018 08:23) (55 - 86)  BP: 158/60 (21 Mar 2018 06:10) (105/54 - 158/60)  BP(mean): --  RR: 18 (21 Mar 2018 06:10) (18 - 18)  SpO2: 98% (21 Mar 2018 06:10) (98% - 99%)    CAPILLARY BLOOD GLUCOSE  POCT Blood Glucose.: 98 mg/dL (21 Mar 2018 08:09)  POCT Blood Glucose.: 111 mg/dL (20 Mar 2018 20:34)  POCT Blood Glucose.: 110 mg/dL (20 Mar 2018 18:04)  POCT Blood Glucose.: 103 mg/dL (20 Mar 2018 12:39)    PHYSICAL EXAMINATION:  ----------------------------------------  General appearance: NAD, Awake, Alert  HEENT: NCAT, Conjunctiva clear, EOMI  Neck: Supple, No JVD, No tenderness  Lungs: Clear to auscultation, Breath sound equal bilaterally, No wheezes, No rales  Cardiovascular: S1S2, Regular rhythm  Abdomen: Soft, Nontender, Nondistended, No guarding/rebound, Positive bowel sounds  Extremities: No clubbing, No cyanosis, No edema, No calf tenderness  Neuro: Strength equal bilaterally, No tremors  Psychiatric: Appropriate mood, Normal affect    LABORATORY STUDIES:  ----------------------------------------             10.3   7.2   )-----------( 232      ( 20 Mar 2018 04:11 )             32.0     03-20    138  |  100  |  15.0  ----------------------------<  118<H>  3.2<L>   |  25.0  |  0.85    Ca    8.6      20 Mar 2018 04:11    TPro  6.2<L>  /  Alb  3.1<L>  /  TBili  0.6  /  DBili  x   /  AST  21  /  ALT  8   /  AlkPhos  73  03-20    LIVER FUNCTIONS - ( 20 Mar 2018 04:11 )  Alb: 3.1 g/dL / Pro: 6.2 g/dL / ALK PHOS: 73 U/L / ALT: 8 U/L / AST: 21 U/L / GGT: x           PT/INR - ( 19 Mar 2018 16:27 )   PT: 11.4 sec;   INR: 1.04 ratio    PTT - ( 19 Mar 2018 16:27 )  PTT:30.6 sec    CARDIAC MARKERS ( 20 Mar 2018 04:12 )  x     / 0.16 ng/mL / x     / x     / x      CARDIAC MARKERS ( 19 Mar 2018 16:27 )  x     / 0.09 ng/mL / x     / x     / x        Urinalysis Basic - ( 19 Mar 2018 18:42 )  Color: Pale Yellow / Appearance: Clear / S.010 / pH: x  Gluc: x / Ketone: Negative  / Bili: Negative / Urobili: Negative mg/dL   Blood: x / Protein: 500 mg/dL / Nitrite: Negative   Leuk Esterase: Negative / RBC: 3-5 /HPF / WBC 0-2   Sq Epi: x / Non Sq Epi: Occasional / Bacteria: Occasional    Culture - Urine (collected 19 Mar 2018 18:40)  Source: .Urine Clean Catch (Midstream)  Final Report (20 Mar 2018 15:08):    No growth    MEDICATIONS  (STANDING):  acetaminophen  Suppository 650 milliGRAM(s) Rectal once  amLODIPine   Tablet 10 milliGRAM(s) Oral daily  aspirin enteric coated 81 milliGRAM(s) Oral daily  atorvastatin 10 milliGRAM(s) Oral at bedtime  dextrose 5%. 1000 milliLiter(s) (50 mL/Hr) IV Continuous <Continuous>  dextrose 50% Injectable 12.5 Gram(s) IV Push once  dextrose 50% Injectable 25 Gram(s) IV Push once  dextrose 50% Injectable 25 Gram(s) IV Push once  diltiazem    milliGRAM(s) Oral daily  enalapril 10 milliGRAM(s) Oral daily  furosemide   Injectable 40 milliGRAM(s) IV Push two times a day  hydrALAZINE 25 milliGRAM(s) Oral three times a day  insulin lispro (HumaLOG) corrective regimen sliding scale   SubCutaneous three times a day before meals  ondansetron Injectable 4 milliGRAM(s) IV Push once  piperacillin/tazobactam IVPB. 3.375 Gram(s) IV Intermittent every 8 hours  vancomycin  IVPB      vancomycin  IVPB 750 milliGRAM(s) IV Intermittent every 12 hours    MEDICATIONS  (PRN):  acetaminophen   Tablet 650 milliGRAM(s) Oral every 6 hours PRN For Temp greater than 38 C (100.4 F)  acetaminophen   Tablet. 650 milliGRAM(s) Oral every 6 hours PRN Mild Pain (1 - 3)  dextrose Gel 1 Dose(s) Oral once PRN Blood Glucose LESS THAN 70 milliGRAM(s)/deciliter  glucagon  Injectable 1 milliGRAM(s) IntraMuscular once PRN Glucose LESS THAN 70 milligrams/deciliter  hydrALAZINE Injectable 10 milliGRAM(s) IV Push every 4 hours PRN SBP>190      ASSESSMENT / PLAN:  ----------------------------------------  Hypertensive urgency - Blood pressure improved. Continue on amlodipine, enalapril, and hydralazine. On telemetry.    Diastolic heart failure - On furosemide and enalapril.    Aortic stenosis - Severe aortic stenosis noted on the prior echocardiogram. Cardiology consultation noted, poor candidate for TAVR. Discussed with the patient's son on the telephone yesterday.    Diabetes - Insulin coverage, close monitoring of blood glucose levels.    Atrial fibrillation - On diltiazem.    Fever - Afebrile and without leukocytosis. Urinalysis was not suggestive of infection. CT of the chest noted a pleural effusion. To discontinue antibiotics.

## 2018-03-21 NOTE — DISCHARGE NOTE ADULT - MEDICATION SUMMARY - MEDICATIONS TO TAKE
I will START or STAY ON the medications listed below when I get home from the hospital:    acetaminophen 325 mg oral tablet  -- 2 tab(s) by mouth every 6 hours, As needed, Mild Pain (1 - 3)  -- Indication: For Pain    aspirin 81 mg oral delayed release tablet  -- 1 tab(s) by mouth once a day  -- Indication: For CAD    enalapril 10 mg oral tablet  -- 1 tab(s) by mouth once a day  -- Indication: For HTN    diltiazem 180 mg/24 hours oral tablet, extended release  -- 1 tab(s) by mouth once a day  -- Indication: For Afib    metFORMIN 500 mg oral tablet  -- 1 tab(s) by mouth   -- Indication: For DM    pravastatin 20 mg oral tablet  -- 1 tab(s) by mouth once a day (at bedtime)  -- Indication: For CAD    amLODIPine 10 mg oral tablet  -- 1 tab(s) by mouth once a day  -- Indication: For HTN    furosemide 40 mg oral tablet  -- 1 tab(s) by mouth once a day   -- Avoid prolonged or excessive exposure to direct and/or artificial sunlight while taking this medication.  It is very important that you take or use this exactly as directed.  Do not skip doses or discontinue unless directed by your doctor.  It may be advisable to drink a full glass orange juice or eat a banana daily while taking this medication.    -- Indication: For HTN    hydrALAZINE 50 mg oral tablet  -- 0.5 tab(s) by mouth 3 times a day   -- Indication: For HTN

## 2018-03-21 NOTE — DISCHARGE NOTE ADULT - HOSPITAL COURSE
88F presented with altered mental status. On presentation, Temp(100.6), BP(240/79). On presentation, WBC(7.3), H/H(11.1/35.3), Trop(0.09),   Urinalysis was not suggestive of infection. 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 CT of the chest noted no focal airspace consolidation, noted a moderate right pleural effusion and a mild left pleural effusion, cardiomegaly with a calcified aortic arch, calcified coronary arteries, and moderate pericardial effusion. Lower extremity Doppler was without DVT. Empiric antibiotics were initiated. The CT findings were discussed with the patient’s son who did not wish to pursue CT Surgery evaluation and possible thoracentesis. The patient was seen by Cardiology in consultation and noted to have a history of severe aortic stenosis. Prior echocardiogram noted mild concentric left ventricular hypertrophy, normal global left ventricular systolic function, ejection fraction of 70 to 75%, severe aortic stenosis. The patient’s son reported that the patient was at her baseline mental status and that he has been aware of the aortic stenosis for many years. The patient remained stable and was discharged home with instructions to follow up with her primary care physician and cardiologist for further management.    35 minutes total time

## 2018-03-21 NOTE — DISCHARGE NOTE ADULT - CARE PLAN
Principal Discharge DX:	Hypertensive urgency  Goal:	.  Assessment and plan of treatment:	Continue on the cardiac medications. Follow up with your primary care physician and cardiologist for further management.  Secondary Diagnosis:	Type 2 diabetes mellitus without complication, without long-term current use of insulin  Assessment and plan of treatment:	Continue on your home diabetes medications. Monitor glucose levels closely.  Secondary Diagnosis:	Atrial fibrillation  Assessment and plan of treatment:	Continue on diltiazem.

## 2018-03-21 NOTE — PROGRESS NOTE ADULT - SUBJECTIVE AND OBJECTIVE BOX
[facility] - [facility], THE [facility]ART [facility]ENTER                                   [address] [place] [address]                                                      PHONE: [phone]                                                         FAX: [phone]  [email or web address]  ---------------------------------------------------------------------------------------------------------------------------------    Overnight events/patient complaints: pt arousable not responding to questions, in fetal position, refusing full exam, lying flat comfortably      No Known Drug Allergies  Shellfish (Rash)    MEDICATIONS  (STANDING):  acetaminophen  Suppository 650 milliGRAM(s) Rectal once  amLODIPine   Tablet 10 milliGRAM(s) Oral daily  aspirin enteric coated 81 milliGRAM(s) Oral daily  atorvastatin 10 milliGRAM(s) Oral at bedtime  dextrose 5%. 1000 milliLiter(s) (50 mL/Hr) IV Continuous <Continuous>  dextrose 50% Injectable 12.5 Gram(s) IV Push once  dextrose 50% Injectable 25 Gram(s) IV Push once  dextrose 50% Injectable 25 Gram(s) IV Push once  diltiazem    milliGRAM(s) Oral daily  enalapril 10 milliGRAM(s) Oral daily  furosemide   Injectable 40 milliGRAM(s) IV Push two times a day  hydrALAZINE 25 milliGRAM(s) Oral three times a day  insulin lispro (HumaLOG) corrective regimen sliding scale   SubCutaneous three times a day before meals  ondansetron Injectable 4 milliGRAM(s) IV Push once  piperacillin/tazobactam IVPB. 3.375 Gram(s) IV Intermittent every 8 hours  potassium chloride  10 mEq/100 mL IVPB 10 milliEquivalent(s) IV Intermittent every 4 hours  vancomycin  IVPB      vancomycin  IVPB 750 milliGRAM(s) IV Intermittent every 12 hours    MEDICATIONS  (PRN):  acetaminophen   Tablet 650 milliGRAM(s) Oral every 6 hours PRN For Temp greater than 38 C (100.4 F)  acetaminophen   Tablet. 650 milliGRAM(s) Oral every 6 hours PRN Mild Pain (1 - 3)  dextrose Gel 1 Dose(s) Oral once PRN Blood Glucose LESS THAN 70 milliGRAM(s)/deciliter  glucagon  Injectable 1 milliGRAM(s) IntraMuscular once PRN Glucose LESS THAN 70 milligrams/deciliter  hydrALAZINE Injectable 10 milliGRAM(s) IV Push every 4 hours PRN SBP>190      Vital Signs Last 24 Hrs  T(C): 36.9 (20 Mar 2018 06:05), Max: 37.3 (19 Mar 2018 22:05)  T(F): 98.4 (20 Mar 2018 06:05), Max: 99.2 (20 Mar 2018 03:40)  HR: 60 (20 Mar 2018 06:05) (60 - 95)  BP: 140/60 (20 Mar 2018 06:05) (102/65 - 240/79)  BP(mean): --  RR: 18 (20 Mar 2018 06:05) (16 - 22)  SpO2: 97% (20 Mar 2018 06:05) (94% - 99%)  Daily Height in cm: 149.86 (19 Mar 2018 15:22)    Daily Weight in k.3 (20 Mar 2018 03:40)  ICU Vital Signs Last 24 Hrs  SIMON BRISENO  I&O's Detail    I&O's Summary    Drug Dosing Weight  [facility]      PHYSICAL EXAM:  General: Appears well developed, well nourished alert and cooperative.  HEENT: Head; normocephalic, atraumatic.  Eyes: Pupils reactive, cornea wnl.  Neck: Supple, no nodes adenopathy, no NVD or carotid bruit or thyromegaly.  CARDIOVASCULAR: Normal C3dybcxw S2 3/6 systolic murmur at base, No murmur, rub, gallop or lift.   LUNGS: No rales, rhonchi or wheeze. Normal breath sounds bilaterally.  ABDOMEN: Soft, nontender without mass or organomegaly. bowel sounds normoactive.  EXTREMITIES: No clubbing, cyanosis or edema. Distal pulses wnl.   SKIN: warm and dry with normal turgor.  NEURO: Alert/oriented x 3/normal motor exam. No pathologic reflexes.    PSYCH: normal affect.        LABS:                        10.3   7.2   )-----------( 232      ( 20 Mar 2018 04:11 )             32.0     03-20    138  |  100  |  15.0  ----------------------------<  118<H>  3.2<L>   |  25.0  |  0.85    Ca    8.6      20 Mar 2018 04:11    TPro  6.2<L>  /  Alb  3.1<L>  /  TBili  0.6  /  DBili  x   /  AST  21  /  ALT  8   /  AlkPhos  73  03-20    SIMON SUZANNA  CARDIAC MARKERS ( 20 Mar 2018 04:12 )  x     / 0.16 ng/mL / x     / x     / x      CARDIAC MARKERS ( 19 Mar 2018 16:27 )  x     / 0.09 ng/mL / x     / x     / x          PT/INR - ( 19 Mar 2018 16:27 )   PT: 11.4 sec;   INR: 1.04 ratio         PTT - ( 19 Mar 2018 16:27 )  PTT:30.6 sec  Urinalysis Basic - ( 19 Mar 2018 18:42 )    Color: Pale Yellow / Appearance: Clear / S.010 / pH: x  Gluc: x / Ketone: Negative  / Bili: Negative / Urobili: Negative mg/dL   Blood: x / Protein: 500 mg/dL / Nitrite: Negative   Leuk Esterase: Negative / RBC: 3-5 /HPF / WBC 0-2   Sq Epi: x / Non Sq Epi: Occasional / Bacteria: Occasional        RADIOLOGY & ADDITIONAL STUDIES:    INTERPRETATION OF TELEMETRY (personally reviewed):    ECG: NSR LVH no acute changes    ECHO:< from: TTE Echo Complete w/Doppler (16 @ 14:16) >    IMPRESSION: Summary:   1. Technically fair study.   2. There is mild concentric left ventricular hypertrophy.   3. Normal global left ventricular systolic function.   4. Left ventricular ejection fraction, by visual estimation, is 70 to   75%. LVEF by biplane MOD is 72%.   5. Spectral Doppler shows impaired relaxation pattern of left   ventricular myocardial filling (Grade I diastolic dysfunction).   6. Elevated left atrial and left ventricular end-diastolic pressures.   LVEDP estimated at 30 mmHg.   7. Severe mitral annular calcification.   8. Thickening and calcification of the anterior and posterior mitral   valve leaflets.   9. Moderate aortic regurgitation.  10. Severe aortic valve stenosis.  11. Peak transaortic gradient equals 67.7 mmHg, mean transaortic gradient   equals 33.3 mmHg, the calculated aortic valve area equals 0.72 cm² by the   continuity equation consistent with severe aortic stenosis.  12. Mild mitral valve regurgitation.  13. There is no evidence of pericardial effusion.     MD Som Electronically signed on 9/3/2016 at 6:11:38 PM               DELROY ELISE   This document has been electronically signed. Sep  3 2016  6:12P    < end of copied text >      < from: Xray Chest 1 View AP/PA. (18 @ 18:45) >    IMPRESSION:   Cardiomegaly .  The right upper lobe infiltrate versus mass which is new compared to   < from: CT Chest No Cont (18 @ 20:50) >    IMPRESSION:    Moderate right pleural effusion . mild left pleural effusion with a left   lower lobe dependent atelectasis .      Gross cardiomegaly with moderate pericardial effusion .   Coronary artery, calcified mitral annulus and calcified plaques    throughout thoracic aorta without focal aneurysm.                 TAI MCKEON M.D., ATTENDING RADIOLOGIST  This document has been electronically signed. Mar 19 2018  8:58PM        < end of copied text >  9/3/2016 examination. Follow-up CT scan recommended. Mass measures 2.3   cm..                    < end of copied text >

## 2018-03-21 NOTE — DISCHARGE NOTE ADULT - PATIENT PORTAL LINK FT
You can access the [facility] Patient Portal, offered by [facility], by registering with the following website: [email or web address]

## 2018-03-23 DIAGNOSIS — R69 ILLNESS, UNSPECIFIED: ICD-10-CM

## 2018-10-21 ENCOUNTER — INPATIENT (INPATIENT)
Facility: HOSPITAL | Age: 83
LOS: 12 days | DRG: 64 | End: 2018-11-03
Attending: INTERNAL MEDICINE | Admitting: INTERNAL MEDICINE
Payer: MEDICARE

## 2018-10-21 VITALS
OXYGEN SATURATION: 100 % | WEIGHT: 80.91 LBS | HEART RATE: 94 BPM | TEMPERATURE: 96 F | RESPIRATION RATE: 27 BRPM | DIASTOLIC BLOOD PRESSURE: 103 MMHG | SYSTOLIC BLOOD PRESSURE: 237 MMHG

## 2018-10-21 DIAGNOSIS — S06.349A TRAUMATIC HEMORRHAGE OF RIGHT CEREBRUM WITH LOSS OF CONSCIOUSNESS OF UNSPECIFIED DURATION, INITIAL ENCOUNTER: ICD-10-CM

## 2018-10-21 DIAGNOSIS — J96.01 ACUTE RESPIRATORY FAILURE WITH HYPOXIA: ICD-10-CM

## 2018-10-21 DIAGNOSIS — I63.9 CEREBRAL INFARCTION, UNSPECIFIED: ICD-10-CM

## 2018-10-21 DIAGNOSIS — Z90.89 ACQUIRED ABSENCE OF OTHER ORGANS: Chronic | ICD-10-CM

## 2018-10-21 DIAGNOSIS — I48.91 UNSPECIFIED ATRIAL FIBRILLATION: ICD-10-CM

## 2018-10-21 DIAGNOSIS — E11.9 TYPE 2 DIABETES MELLITUS WITHOUT COMPLICATIONS: ICD-10-CM
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
## 2018-10-21 PROBLEM — I62.00 NONTRAUMATIC SUBDURAL HEMORRHAGE, UNSPECIFIED: Chronic | Status: ACTIVE | Noted: 2018-03-19

## 2018-10-21 PROBLEM — R13.11 DYSPHAGIA, ORAL PHASE: Chronic | Status: ACTIVE | Noted: 2018-03-19

## 2018-10-21 LAB
ALBUMIN SERPL ELPH-MCNC: 4.2 G/DL — SIGNIFICANT CHANGE UP (ref 3.3–5.2)
ALP SERPL-CCNC: 90 U/L — SIGNIFICANT CHANGE UP (ref 40–120)
ALT FLD-CCNC: 10 U/L — SIGNIFICANT CHANGE UP
ANION GAP SERPL CALC-SCNC: 14 MMOL/L — SIGNIFICANT CHANGE UP (ref 5–17)
APPEARANCE UR: CLEAR — SIGNIFICANT CHANGE UP
APTT BLD: 27.2 SEC — LOW (ref 27.5–37.4)
AST SERPL-CCNC: 36 U/L — HIGH
BACTERIA # UR AUTO: ABNORMAL
BASE EXCESS BLDA CALC-SCNC: -2 MMOL/L — SIGNIFICANT CHANGE UP (ref -2–2)
BASE EXCESS BLDV CALC-SCNC: 1.8 MMOL/L — SIGNIFICANT CHANGE UP (ref -2–2)
BASOPHILS # BLD AUTO: 0 K/UL — SIGNIFICANT CHANGE UP (ref 0–0.2)
BASOPHILS NFR BLD AUTO: 0.3 % — SIGNIFICANT CHANGE UP (ref 0–2)
BILIRUB SERPL-MCNC: 0.6 MG/DL — SIGNIFICANT CHANGE UP (ref 0.4–2)
BILIRUB UR-MCNC: NEGATIVE — SIGNIFICANT CHANGE UP
BLD GP AB SCN SERPL QL: SIGNIFICANT CHANGE UP
BLOOD GAS COMMENTS ARTERIAL: SIGNIFICANT CHANGE UP
BUN SERPL-MCNC: 23 MG/DL — HIGH (ref 8–20)
CA-I SERPL-SCNC: 1.17 MMOL/L — SIGNIFICANT CHANGE UP (ref 1.15–1.33)
CALCIUM SERPL-MCNC: 9.6 MG/DL — SIGNIFICANT CHANGE UP (ref 8.6–10.2)
CHLORIDE BLDV-SCNC: 105 MMOL/L — SIGNIFICANT CHANGE UP (ref 98–107)
CHLORIDE SERPL-SCNC: 103 MMOL/L — SIGNIFICANT CHANGE UP (ref 98–107)
CO2 SERPL-SCNC: 25 MMOL/L — SIGNIFICANT CHANGE UP (ref 22–29)
COLOR SPEC: YELLOW — SIGNIFICANT CHANGE UP
CREAT SERPL-MCNC: 0.89 MG/DL — SIGNIFICANT CHANGE UP (ref 0.5–1.3)
DIFF PNL FLD: ABNORMAL
EOSINOPHIL # BLD AUTO: 0 K/UL — SIGNIFICANT CHANGE UP (ref 0–0.5)
EOSINOPHIL NFR BLD AUTO: 0.3 % — SIGNIFICANT CHANGE UP (ref 0–6)
EPI CELLS # UR: SIGNIFICANT CHANGE UP
GAS PNL BLDA: SIGNIFICANT CHANGE UP
GAS PNL BLDV: 147 MMOL/L — HIGH (ref 135–145)
GAS PNL BLDV: SIGNIFICANT CHANGE UP
GAS PNL BLDV: SIGNIFICANT CHANGE UP
GLUCOSE BLDV-MCNC: 186 MG/DL — HIGH (ref 70–99)
GLUCOSE SERPL-MCNC: 197 MG/DL — HIGH (ref 70–115)
GLUCOSE UR QL: 100 MG/DL
HCO3 BLDA-SCNC: 23 MMOL/L — SIGNIFICANT CHANGE UP (ref 20–26)
HCO3 BLDV-SCNC: 26 MMOL/L — SIGNIFICANT CHANGE UP (ref 21–29)
HCT VFR BLD CALC: 41.5 % — SIGNIFICANT CHANGE UP (ref 37–47)
HCT VFR BLDA CALC: 43 — SIGNIFICANT CHANGE UP (ref 39–50)
HGB BLD CALC-MCNC: 14.1 G/DL — SIGNIFICANT CHANGE UP (ref 11.5–15.5)
HGB BLD-MCNC: 13.4 G/DL — SIGNIFICANT CHANGE UP (ref 12–16)
HOROWITZ INDEX BLDA+IHG-RTO: 40 — SIGNIFICANT CHANGE UP
INR BLD: 0.98 RATIO — SIGNIFICANT CHANGE UP (ref 0.88–1.16)
KETONES UR-MCNC: ABNORMAL
LACTATE BLDV-MCNC: 2.1 MMOL/L — HIGH (ref 0.5–2)
LEUKOCYTE ESTERASE UR-ACNC: ABNORMAL
LYMPHOCYTES # BLD AUTO: 1.4 K/UL — SIGNIFICANT CHANGE UP (ref 1–4.8)
LYMPHOCYTES # BLD AUTO: 14.2 % — LOW (ref 20–55)
MAGNESIUM SERPL-MCNC: 2 MG/DL — SIGNIFICANT CHANGE UP (ref 1.8–2.6)
MCHC RBC-ENTMCNC: 27.3 PG — SIGNIFICANT CHANGE UP (ref 27–31)
MCHC RBC-ENTMCNC: 32.3 G/DL — SIGNIFICANT CHANGE UP (ref 32–36)
MCV RBC AUTO: 84.7 FL — SIGNIFICANT CHANGE UP (ref 81–99)
MONOCYTES # BLD AUTO: 0.4 K/UL — SIGNIFICANT CHANGE UP (ref 0–0.8)
MONOCYTES NFR BLD AUTO: 4.3 % — SIGNIFICANT CHANGE UP (ref 3–10)
NEUTROPHILS # BLD AUTO: 7.9 K/UL — SIGNIFICANT CHANGE UP (ref 1.8–8)
NEUTROPHILS NFR BLD AUTO: 80.7 % — HIGH (ref 37–73)
NITRITE UR-MCNC: POSITIVE
OTHER CELLS CSF MANUAL: 14 ML/DL — LOW (ref 18–22)
PCO2 BLDA: 40 MMHG — SIGNIFICANT CHANGE UP (ref 35–45)
PCO2 BLDV: 59 MMHG — HIGH (ref 35–50)
PH BLDA: 7.37 — SIGNIFICANT CHANGE UP (ref 7.35–7.45)
PH BLDV: 7.31 — LOW (ref 7.32–7.43)
PH UR: 7 — SIGNIFICANT CHANGE UP (ref 5–8)
PLATELET # BLD AUTO: 228 K/UL — SIGNIFICANT CHANGE UP (ref 150–400)
PO2 BLDA: 163 MMHG — HIGH (ref 83–108)
PO2 BLDV: 42 MMHG — SIGNIFICANT CHANGE UP (ref 25–45)
POTASSIUM BLDV-SCNC: 3.7 MMOL/L — SIGNIFICANT CHANGE UP (ref 3.4–4.5)
POTASSIUM SERPL-MCNC: 3.9 MMOL/L — SIGNIFICANT CHANGE UP (ref 3.5–5.3)
POTASSIUM SERPL-SCNC: 3.9 MMOL/L — SIGNIFICANT CHANGE UP (ref 3.5–5.3)
PROT SERPL-MCNC: 7.3 G/DL — SIGNIFICANT CHANGE UP (ref 6.6–8.7)
PROT UR-MCNC: 500 MG/DL
PROTHROM AB SERPL-ACNC: 10.8 SEC — SIGNIFICANT CHANGE UP (ref 9.8–12.7)
RBC # BLD: 4.9 M/UL — SIGNIFICANT CHANGE UP (ref 4.4–5.2)
RBC # FLD: 14.3 % — SIGNIFICANT CHANGE UP (ref 11–15.6)
RBC CASTS # UR COMP ASSIST: ABNORMAL /HPF (ref 0–4)
SAO2 % BLDA: 100 % — HIGH (ref 95–99)
SAO2 % BLDV: 74 % — SIGNIFICANT CHANGE UP
SODIUM SERPL-SCNC: 142 MMOL/L — SIGNIFICANT CHANGE UP (ref 135–145)
SP GR SPEC: 1.01 — SIGNIFICANT CHANGE UP (ref 1.01–1.02)
TYPE + AB SCN PNL BLD: SIGNIFICANT CHANGE UP
UROBILINOGEN FLD QL: NEGATIVE MG/DL — SIGNIFICANT CHANGE UP
WBC # BLD: 9.8 K/UL — SIGNIFICANT CHANGE UP (ref 4.8–10.8)
WBC # FLD AUTO: 9.8 K/UL — SIGNIFICANT CHANGE UP (ref 4.8–10.8)
WBC UR QL: ABNORMAL

## 2018-10-21 PROCEDURE — 31500 INSERT EMERGENCY AIRWAY: CPT

## 2018-10-21 PROCEDURE — 99291 CRITICAL CARE FIRST HOUR: CPT | Mod: 25

## 2018-10-21 PROCEDURE — 71045 X-RAY EXAM CHEST 1 VIEW: CPT | Mod: 26

## 2018-10-21 PROCEDURE — 70450 CT HEAD/BRAIN W/O DYE: CPT | Mod: 26

## 2018-10-21 PROCEDURE — 99222 1ST HOSP IP/OBS MODERATE 55: CPT

## 2018-10-21 RX ORDER — DESMOPRESSIN ACETATE 0.1 MG/1
15 TABLET ORAL ONCE
Qty: 0 | Refills: 0 | Status: DISCONTINUED | OUTPATIENT
Start: 2018-10-21 | End: 2018-10-21

## 2018-10-21 RX ORDER — ETOMIDATE 2 MG/ML
20 INJECTION INTRAVENOUS ONCE
Qty: 0 | Refills: 0 | Status: COMPLETED | OUTPATIENT
Start: 2018-10-21 | End: 2018-10-21

## 2018-10-21 RX ORDER — PROPOFOL 10 MG/ML
25 INJECTION, EMULSION INTRAVENOUS
Qty: 1000 | Refills: 0 | Status: DISCONTINUED | OUTPATIENT
Start: 2018-10-21 | End: 2018-10-24

## 2018-10-21 RX ORDER — LABETALOL HCL 100 MG
20 TABLET ORAL ONCE
Qty: 0 | Refills: 0 | Status: COMPLETED | OUTPATIENT
Start: 2018-10-21 | End: 2018-10-21

## 2018-10-21 RX ORDER — SODIUM CHLORIDE 9 MG/ML
500 INJECTION INTRAMUSCULAR; INTRAVENOUS; SUBCUTANEOUS ONCE
Qty: 0 | Refills: 0 | Status: DISCONTINUED | OUTPATIENT
Start: 2018-10-21 | End: 2018-10-21

## 2018-10-21 RX ORDER — PANTOPRAZOLE SODIUM 20 MG/1
40 TABLET, DELAYED RELEASE ORAL DAILY
Qty: 0 | Refills: 0 | Status: DISCONTINUED | OUTPATIENT
Start: 2018-10-21 | End: 2018-10-27

## 2018-10-21 RX ORDER — DESMOPRESSIN ACETATE 0.1 MG/1
15 TABLET ORAL ONCE
Qty: 0 | Refills: 0 | Status: COMPLETED | OUTPATIENT
Start: 2018-10-21 | End: 2018-10-21

## 2018-10-21 RX ORDER — SUCCINYLCHOLINE CHLORIDE 100 MG/5ML
80 SYRINGE (ML) INTRAVENOUS ONCE
Qty: 0 | Refills: 0 | Status: COMPLETED | OUTPATIENT
Start: 2018-10-21 | End: 2018-10-21

## 2018-10-21 RX ORDER — CHLORHEXIDINE GLUCONATE 213 G/1000ML
15 SOLUTION TOPICAL
Qty: 0 | Refills: 0 | Status: DISCONTINUED | OUTPATIENT
Start: 2018-10-21 | End: 2018-11-02

## 2018-10-21 RX ORDER — PROPOFOL 10 MG/ML
20 INJECTION, EMULSION INTRAVENOUS ONCE
Qty: 0 | Refills: 0 | Status: COMPLETED | OUTPATIENT
Start: 2018-10-21 | End: 2018-10-21

## 2018-10-21 RX ORDER — FENTANYL CITRATE 50 UG/ML
50 INJECTION INTRAVENOUS
Qty: 0 | Refills: 0 | Status: DISCONTINUED | OUTPATIENT
Start: 2018-10-21 | End: 2018-10-28

## 2018-10-21 RX ORDER — PROPOFOL 10 MG/ML
40 INJECTION, EMULSION INTRAVENOUS ONCE
Qty: 0 | Refills: 0 | Status: COMPLETED | OUTPATIENT
Start: 2018-10-21 | End: 2018-10-21

## 2018-10-21 RX ORDER — LEVETIRACETAM 250 MG/1
1000 TABLET, FILM COATED ORAL ONCE
Qty: 0 | Refills: 0 | Status: COMPLETED | OUTPATIENT
Start: 2018-10-21 | End: 2018-10-21

## 2018-10-21 RX ORDER — NICARDIPINE HYDROCHLORIDE 30 MG/1
2.5 CAPSULE, EXTENDED RELEASE ORAL
Qty: 40 | Refills: 0 | Status: DISCONTINUED | OUTPATIENT
Start: 2018-10-21 | End: 2018-10-24

## 2018-10-21 RX ORDER — SODIUM CHLORIDE 9 MG/ML
1000 INJECTION INTRAMUSCULAR; INTRAVENOUS; SUBCUTANEOUS ONCE
Qty: 0 | Refills: 0 | Status: COMPLETED | OUTPATIENT
Start: 2018-10-21 | End: 2018-10-21

## 2018-10-21 RX ADMIN — DESMOPRESSIN ACETATE 15 MICROGRAM(S): 0.1 TABLET ORAL at 13:03

## 2018-10-21 RX ADMIN — CHLORHEXIDINE GLUCONATE 15 MILLILITER(S): 213 SOLUTION TOPICAL at 18:02

## 2018-10-21 RX ADMIN — SODIUM CHLORIDE 1000 MILLILITER(S): 9 INJECTION INTRAMUSCULAR; INTRAVENOUS; SUBCUTANEOUS at 12:05

## 2018-10-21 RX ADMIN — PROPOFOL 40 MILLIGRAM(S): 10 INJECTION, EMULSION INTRAVENOUS at 11:24

## 2018-10-21 RX ADMIN — Medication 80 MILLIGRAM(S): at 11:19

## 2018-10-21 RX ADMIN — SODIUM CHLORIDE 2000 MILLILITER(S): 9 INJECTION INTRAMUSCULAR; INTRAVENOUS; SUBCUTANEOUS at 11:35

## 2018-10-21 RX ADMIN — Medication 20 MILLIGRAM(S): at 11:29

## 2018-10-21 RX ADMIN — NICARDIPINE HYDROCHLORIDE 12.5 MG/HR: 30 CAPSULE, EXTENDED RELEASE ORAL at 12:24

## 2018-10-21 RX ADMIN — DESMOPRESSIN ACETATE 215 MICROGRAM(S): 0.1 TABLET ORAL at 12:48

## 2018-10-21 RX ADMIN — ETOMIDATE 20 MILLIGRAM(S): 2 INJECTION INTRAVENOUS at 11:18

## 2018-10-21 RX ADMIN — PANTOPRAZOLE SODIUM 40 MILLIGRAM(S): 20 TABLET, DELAYED RELEASE ORAL at 18:02

## 2018-10-21 RX ADMIN — FENTANYL CITRATE 50 MICROGRAM(S): 50 INJECTION INTRAVENOUS at 12:27

## 2018-10-21 RX ADMIN — PROPOFOL 20 MILLIGRAM(S): 10 INJECTION, EMULSION INTRAVENOUS at 11:24

## 2018-10-21 RX ADMIN — PROPOFOL 5.5 MICROGRAM(S)/KG/MIN: 10 INJECTION, EMULSION INTRAVENOUS at 11:51

## 2018-10-21 RX ADMIN — LEVETIRACETAM 1000 MILLIGRAM(S): 250 TABLET, FILM COATED ORAL at 12:43

## 2018-10-21 RX ADMIN — LEVETIRACETAM 400 MILLIGRAM(S): 250 TABLET, FILM COATED ORAL at 12:28

## 2018-10-21 RX ADMIN — FENTANYL CITRATE 50 MICROGRAM(S): 50 INJECTION INTRAVENOUS at 13:19

## 2018-10-21 RX ADMIN — PROPOFOL 5.5 MICROGRAM(S)/KG/MIN: 10 INJECTION, EMULSION INTRAVENOUS at 12:25

## 2018-10-21 NOTE — ED PROVIDER NOTE - PROGRESS NOTE DETAILS
My wet read of CT shows large acute intraparenchymal hemorrhage with intraventricular involvement, also small subdural. Nicardipine gtt started, keppra ordered. DDAVP ordered. D/w neurosurgery PA who asks me NOT to give platelets at this time. HEad of bed elevated Neurosurgery at bedside Neurosurgery says not surgical candidate, no indication for EVD. Not candidate for JORJE 3 trial at [facility]. Will admit to MICU.

## 2018-10-21 NOTE — PROGRESS NOTE ADULT - PROBLEM SELECTOR PLAN 1
Non-traumatic and Likely HTN in nature,  Serial Neuro exams,  BP control with nicardipine, with target SBP < 140.  Discussed goals of care with granddaughter Meena. DNR now in place, Family will likely remove her from vent support in the next 24 hours, understanding her poor prognosis  Palliative Care evaluation

## 2018-10-21 NOTE — ED PROVIDER NOTE - PHYSICAL EXAMINATION
Vitals: severely hypertensive  GEN: no spontaneous behavior, nonverbal, agonal breathing  HEAD: NCAT  EYES: pupils 2.5mm, sluggishly reactive, eyes devaited to right with rapid repetative saccades to R spontaneously, not responsive to confrontation to visual fields, neg lash reflex  ENT: dried vomitus outside mouth, edentulous on maxillary gums, mucus membranes are dry, orpharynx WNL, dried vomitus in mouth  CV: normal rate, regular rhythm, S1, S2, 2+ distal pulses, hypertensive  RESP: agonal shallow "guppy" breathing, Rhonci and wet crackles on R>L, normoxemic on O2  GI: abdomen soft, nontender, nondistended  : no garcia in place, grossly normal external genetalia  MSK: extremities atraumatic nontender  SKIN: warm, dry, no rash  NEURO: withdraws to pain in RUE, RLE, LLE, flexor posturing in LUE, no facial asymmetry, eye movements and pupils as above, no purposeful movement, GCS6 [id number]

## 2018-10-21 NOTE — PROGRESS NOTE ADULT - SUBJECTIVE AND OBJECTIVE BOX
Patient is a 89y old  Female who presents with a chief complaint of     BRIEF HOSPITAL COURSE: 89F with HTN, dementia, was brought to the ER after being found this morning unarousable and sonorously breathing in her bed. SHe was last seen normal at 8pm last night.  In the ER she was intubated for airway protection., She was unresponsive to voice or command and only withdrew to painful stimuli. Her BP was noted to be greater than 200. A STAT head Showed diffuse and extensive intra-parenchymal and intraventricular with leftward shift and deviation.  Coags were noted to be normal and IV Nicardipine was initiated for BP control.         PAST MEDICAL & SURGICAL HISTORY:  Oral phase dysphagia  Fall, initial encounter  SDH (subdural hematoma)  CVA (cerebral vascular accident)  Diabetes  Atrial fibrillation  High cholesterol  Hypertension  Delivered by  section  S/P tonsillectomy      Review of Systems:  CONSTITUTIONAL: No fever, chills, or fatigue  EYES: No eye pain, visual disturbances, or discharge  ENMT:  No difficulty hearing, tinnitus, vertigo; No sinus or throat pain  NECK: No pain or stiffness  RESPIRATORY: No cough, wheezing, chills or hemoptysis; No shortness of breath  CARDIOVASCULAR: No chest pain, palpitations, dizziness, or leg swelling  GASTROINTESTINAL: No abdominal or epigastric pain. No nausea, vomiting, or hematemesis; No diarrhea or constipation. No melena or hematochezia.  GENITOURINARY: No dysuria, frequency, hematuria, or incontinence  NEUROLOGICAL: No headaches, memory loss, loss of strength, numbness, or tremors  SKIN: No itching, burning, rashes, or lesions   MUSCULOSKELETAL: No joint pain or swelling; No muscle, back, or extremity pain  PSYCHIATRIC: No depression, anxiety, mood swings, or difficulty sleeping      Medications:    niCARdipine Infusion 2.5 mG/Hr IV Continuous <Continuous>      fentaNYL    Injectable 50 MICROGram(s) IV Push every 2 hours PRN  propofol Infusion 25 MICROgram(s)/kG/Min IV Continuous <Continuous>        pantoprazole  Injectable 40 milliGRAM(s) IV Push daily            chlorhexidine 0.12% Liquid 15 milliLiter(s) Oral Mucosa two times a day        Mode: AC/ CMV (Assist Control/ Continuous Mandatory Ventilation)  RR (machine): 14  TV (machine): 360  FiO2: 40  PEEP: 5  MAP: 9  PIP: 27      ICU Vital Signs Last 24 Hrs  T(C): 37.8 (21 Oct 2018 20:30), Max: 37.8 (21 Oct 2018 20:30)  T(F): 100 (21 Oct 2018 20:30), Max: 100 (21 Oct 2018 20:30)  HR: 83 (21 Oct 2018 22:00) (58 - 96)  BP: 140/70 (21 Oct 2018 22:00) (92/53 - 237/103)  BP(mean): 100 (21 Oct 2018 22:00) (81 - 112)  ABP: --  ABP(mean): --  RR: 14 (21 Oct 2018 22:00) (14 - 27)  SpO2: 100% (21 Oct 2018 22:00) (99% - 100%)      ABG - ( 21 Oct 2018 12:09 )  pH, Arterial: 7.37  pH, Blood: x     /  pCO2: 40    /  pO2: 163   / HCO3: 23    / Base Excess: -2.0  /  SaO2: 100                 I&O's Detail    21 Oct 2018 07:01  -  21 Oct 2018 22:23  --------------------------------------------------------  IN:    niCARdipine Infusion: 43.8 mL    propofol Infusion: 17.6 mL  Total IN: 61.4 mL    OUT:    Indwelling Catheter - Urethral: 215 mL    Nasoenteral Tube: 25 mL  Total OUT: 240 mL    Total NET: -178.6 mL            LABS:                        13.4   9.8   )-----------( 228      ( 21 Oct 2018 11:30 )             41.5     10-21    142  |  103  |  23.0<H>  ----------------------------<  197<H>  3.9   |  25.0  |  0.89    Ca    9.6      21 Oct 2018 11:30  Mg     2.0     10-    TPro  7.3  /  Alb  4.2  /  TBili  0.6  /  DBili  x   /  AST  36<H>  /  ALT  10  /  AlkPhos  90  10-21          CAPILLARY BLOOD GLUCOSE        PT/INR - ( 21 Oct 2018 11:30 )   PT: 10.8 sec;   INR: 0.98 ratio         PTT - ( 21 Oct 2018 11:30 )  PTT:27.2 sec  Urinalysis Basic - ( 21 Oct 2018 16:12 )    Color: Yellow / Appearance: Clear / S.010 / pH: x  Gluc: x / Ketone: Trace  / Bili: Negative / Urobili: Negative mg/dL   Blood: x / Protein: 500 mg/dL / Nitrite: Positive   Leuk Esterase: Trace / RBC: 11-25 /HPF / WBC 6-10   Sq Epi: x / Non Sq Epi: Occasional / Bacteria: Many      CULTURES:      Physical Examination:    General: Intubated and nonresponsive to verbal stimuli. No spontaneous movements    HEENT: No signs of trauma or acute injury. Pupils 4 mm B/L and sluggishly responsive  Symmetric.    PULM: Good equal BS decreased at bases     CVS: Regular rate and rhythm, no murmurs, rubs, or gallops    ABD: Soft, nondistended, nontender, normoactive bowel sounds, no masses    EXT: No edema, nontender    Neuro: Unresponsive to verbal stimuli, mild withdrawl to painful stimuli only, no spontaneous movements     SKIN: Warm and well perfused, no rashes noted.    RADIOLOGY: Head CT: Large right temporal/temporoparietal hemorrhage with large   intraventricular extension distending and nearly filling the entire right   lateral ventricle, third ventricle and fourth ventricle. Blood is seen   also extending into the left lateral ventricle which is also dilated.   There is approximate 1.2 cm leftward midline shift. Gyral hemorrhages.  Acute right subdural blood also seen measuring up to approximately 3.5 mm   width with extension along the sphenoid ridges to the right para   mesencephalic cistern. There is impression upon the right side of the   suprasellar cistern which is otherwise clear. The blood filled fourth   ventricle is midline.    Chronic moderate white matter small vessel ischemic change noted.    The skull is intact. The visualized portion of the paranasal sinuses and   mastoid air cells are clear.    Impression:    Extensive acute intraparenchymal and intraventricular hemorrhage with   leftward midline shift and hydrocephalus as described. Acute right   subdural also noted as described.        CRITICAL CARE TIME SPENT: 40 mins

## 2018-10-21 NOTE — ED PROCEDURE NOTE - CPROC ED INFORMED CONSENT1
This was an emergent procedure and consent was implied./discussed code status with grandson prior to intubation, grandson did not know, elected to proceed

## 2018-10-21 NOTE — CONSULT NOTE ADULT - SUBJECTIVE AND OBJECTIVE BOX
HISTORY OF PRESENT ILLNESS:   89 year old female PMH afib on ASA81, HTN (compliant), HLD, h/o SDH, admitted to [facility] when family found patient vomiting this AM around 11:00 in bed and unresponsive. Grandson checked blood sugar (193) and noted faint breathing and called the ambulance. Patient was last known well 20:00 last night 10/20/18. At baseline is AOx3, able to eat on own and make one aware of her needs but had some difficulties ambulating. Patient was intubated in ED for airway protection. SBP on assessment 214    PAST MEDICAL & SURGICAL HISTORY:  Oral phase dysphagia  Fall, initial encounter  SDH (subdural hematoma)  CVA (cerebral vascular accident)  Diabetes  Atrial fibrillation  High cholesterol  Hypertension  Delivered by  section  S/P tonsillectomy    FAMILY HISTORY:  No pertinent family history in first degree relatives    SOCIAL HISTORY:  Denies tobacco use or alcohol consumption    Allergies  No Known Drug Allergies  Shellfish (Rash)    REVIEW OF SYSTEMS Unable to obtain    MEDICATIONS:  Antibiotics:    Neuro:  fentaNYL    Injectable 50 MICROGram(s) IV Push every 2 hours PRN  levETIRAcetam  IVPB 1000 milliGRAM(s) IV Intermittent once  propofol Infusion 25 MICROgram(s)/kG/Min IV Continuous <Continuous>  propofol Injectable 20 milliGRAM(s) IV Push once    OTHER:  desmopressin IVPB 15 MICROGram(s) IV Intermittent Once  niCARdipine Infusion 2.5 mG/Hr IV Continuous <Continuous>    Vital Signs Last 24 Hrs  T(C): 35.5 (21 Oct 2018 11:15), Max: 35.5 (21 Oct 2018 11:15)  T(F): 95.9 (21 Oct 2018 11:15), Max: 95.9 (21 Oct 2018 11:15)  HR: 65 (21 Oct 2018 11:40) (63 - 96)  BP: 160/72 (21 Oct 2018 11:40) (92/53 - 237/103)  BP(mean): --  RR: 21 (21 Oct 2018 11:40) (15 - 27)  SpO2: 100% (21 Oct 2018 11:40) (100% - 100%)    PHYSICAL EXAM:  GCS: E-1  V-1T M-4= 6T  GENERAL: NAD, thin  HEAD:  Atraumatic, normocephalic  EYES: Arcus senilus b/l; disfigured pupils deviated upwards ~2 mm sluggish; conjunctiva and sclera clear  MENTAL STATUS: Intubated. Does not open eyes. Not following commands  CRANIAL NERVES: pupils disfigured, deviated upwards; does not blink to threat b/l; no facial asymmetry appreciated  MOTOR: withdraws to noxious x4  CHEST/LUNG: crackles b/l  HEART: +S1/+S2; Regular rate    LABS:                        13.4   9.8   )-----------( 228      ( 21 Oct 2018 11:30 )             41.5     10-21    142  |  103  |  23.0<H>  ----------------------------<  197<H>  3.9   |  25.0  |  0.89    Ca    9.6      21 Oct 2018 11:30  Mg     2.0     10-21    TPro  7.3  /  Alb  4.2  /  TBili  0.6  /  DBili  x   /  AST  36<H>  /  ALT  10  /  AlkPhos  90  10-21    PT/INR - ( 21 Oct 2018 11:30 )   PT: 10.8 sec;   INR: 0.98 ratio    PTT - ( 21 Oct 2018 11:30 )  PTT:27.2 sec    RADIOLOGY & ADDITIONAL STUDIES:  CT Head No Cont (10.21.18 @ 11:55)  Impression:  Extensive acute intraparenchymal and intraventricular hemorrhage with   leftward midline shift and hydrocephalus as described. Acute right   subdural also noted as described.

## 2018-10-21 NOTE — ED PROVIDER NOTE - MEDICAL DECISION MAKING DETAILS
Pt presents hypertesnsive unresponsive, repeative eye movements, concern for seizure/status, not protecting airway. Grandson at bedside unsure of code status and quick chart review did not clarify plans. Due to emergency decision made with grandson who could not reach other family to intubate.

## 2018-10-21 NOTE — ED ADULT NURSE NOTE - OBJECTIVE STATEMENT
89 year old female BIBA from home per EMS pt found unresponsive in bed 93% on RA with coffee ground vomitus to mouth and pillowm, fingerstick 131. Pt with hx of DM<m, HTN alert and oriented, ambulatory, last seen last night by family and had no complaints. Family went in to room at 1000 and was unable to arouse pt. Code Team, one and Dr Mnedoza and Chris Lopez PA student at bedside. Pt unresponsive with agonal breathing on 100% NRB and BVM initiated. Cardiac and O2 monitoring intiated. Pt intubated by Dr Mendoza with PA student pushing meds ETT 7.5cm, 22cm at lip confirmed by + breath sounds bilaterally and color change to ETCO2m, and OG tube placed by Dr Mendoza

## 2018-10-21 NOTE — ED ADULT TRIAGE NOTE - CHIEF COMPLAINT QUOTE
Patient BIBA from home, patient unresponsive, grandson with the patient. Unknown last known normal. Patient taken to critical care, code team called.

## 2018-10-21 NOTE — PROGRESS NOTE ADULT - ASSESSMENT
89F with a large non-traumatic, spontaneous hemorrhagic CVA, with intraparenchymal and intraventricular hemorrhage. With uncontrolled HTN and respiratory failure.    I spent a extensive amount of time speaking with the patient's granddaughter Meena, including a goals of care conversation.      I updated her regarding the patient's condition. We discussed the severity of her brain injury. She understands that the patient has an overall poor prognosis for improvement. She further more understands that the patient may also worsen and that the is no realistic expectation for substantial recovery in her neurologic status.    She has agreed that continuing aggressive measures at this point are not going to help her underlying condition. She agrees, that at this point if she worsens, or if her heart were to stop, that she should be allowed to pass away naturally and CPR should not be performed.     There are additional family members, including the patient's daughter, Dalia,  who is the primary decision maker, and would decide regarding removal of ventilator support. Dalia is en-route from [place]. The patient resides with her son Jack, however he is not her decision maker, and there appears to be tension that exists between the patient's daughter, grand-daughter and him.

## 2018-10-21 NOTE — CHART NOTE - NSCHARTNOTEFT_GEN_A_CORE
Patient seen and examined in ED.  She is an 88 yo female with afib on ASA, prior CVA, prior SDH, DM, who presented with altered mental status and found to have a massive intraparenchymal hemorrhage with intraventricular extension, midline shift, and obstructive hydrocephalus.  Currently her neuro exam (while on low dose propofol) demonstrates withdrawal to noxious stimuli in both lower extremities.    Patient is not a surgical candidate, and based on the CT brain findings her prognosis is extremely grim.  There is a significant probability that she will progress to brain death criteria, however if she does not then she will be profoundly disabled, requiring tracheostomy/PEG tube and around the clock nursing care. We are awaiting the arrival of the son who is the next of kin/decision maker.  At that point we will need a goals of care discussion.

## 2018-10-21 NOTE — ED PROVIDER NOTE - OBJECTIVE STATEMENT
This is an 89F w/Hx of Afib on aspirin, prior CVA, DM, Subdural hematoma, presents for unresponsive. Pt was last normal last night found unresponsive in bed by family this morning. Usually does not wake up until 10am so they didn't come to see her until after. EMS reports blown pupils but at bedside says he confused her arcus senilis for a cornea. Pt withdrawing from pain per EMS, .

## 2018-10-21 NOTE — ED PROVIDER NOTE - CARE PLAN
Principal Discharge DX:	Intraparenchymal hematoma of brain, right, with loss of consciousness, initial encounter  Secondary Diagnosis:	Subdural hematoma  Secondary Diagnosis:	Acute respiratory failure with hypoxia and hypercapnia

## 2018-10-21 NOTE — ED ADULT NURSE NOTE - LINE DESCRIPTION (INCLUDE FLUIDS IF APPLICABLE)
pt transported to MICU bu RN, RRT, and transport with cardiac and o2 monitoring in place. Bedside report updated with MICU rn's and pt endorsed to same for follow up and continuity of care.

## 2018-10-21 NOTE — ED ADULT NURSE NOTE - PMH
Atrial fibrillation    CVA (cerebral vascular accident)    Diabetes    Fall, initial encounter    High cholesterol    Hypertension    Oral phase dysphagia    SDH (subdural hematoma)
no

## 2018-10-21 NOTE — CONSULT NOTE ADULT - ASSESSMENT
89 year old female PMH afib on ASA81, HTN (compliant), HLD, h/o SDH found unresponsive and vomiting in bed this AM found with extensive large right IPH and IVH with leftward midline shift, along with right SDH  -  on examination, hemorrhage likely hypertensive in nature  PLAN:  - D/w Dr. Colindres  - BP control- SBP goal <160  - Hold anticoagulation/antiplatelet therapy  - Can repeat CT head in 6 hours to assess stability  - Patient is not a candidate for neurosurgical intervention, recommend discussion of goals of care with family and palliative care consult  - Supportive care per primary team 89 year old female PMH afib on ASA81, HTN (compliant), HLD, h/o SDH found unresponsive and vomiting in bed this AM found with extensive large right IPH and IVH with leftward midline shift, along with right SDH  -  on examination, hemorrhage likely hypertensive in nature  PLAN:  - D/w Dr. Colindres  - BP control- SBP goal <160  - Hold anticoagulation/antiplatelet therapy  - Can repeat CT head in 6 hours to assess stability  - No neurosurgical intervention recommended. Recommend discussion of goals of care with family and palliative care consult  - Supportive care per primary team

## 2018-10-21 NOTE — ED PROVIDER NOTE - CRITICAL CARE PROVIDED
direct patient care (not related to procedure)/interpretation of diagnostic studies/consultation with other physicians/consult w/ pt's family directly relating to pts condition/conducted a detailed discussion of DNR status/additional history taking/documentation
negative...

## 2018-10-22 DIAGNOSIS — Z51.5 ENCOUNTER FOR PALLIATIVE CARE: ICD-10-CM

## 2018-10-22 DIAGNOSIS — I10 ESSENTIAL (PRIMARY) HYPERTENSION: ICD-10-CM

## 2018-10-22 DIAGNOSIS — I48.91 UNSPECIFIED ATRIAL FIBRILLATION: ICD-10-CM

## 2018-10-22 LAB
ANION GAP SERPL CALC-SCNC: 16 MMOL/L — SIGNIFICANT CHANGE UP (ref 5–17)
APTT BLD: 31.6 SEC — SIGNIFICANT CHANGE UP (ref 27.5–37.4)
BUN SERPL-MCNC: 24 MG/DL — HIGH (ref 8–20)
CALCIUM SERPL-MCNC: 9.3 MG/DL — SIGNIFICANT CHANGE UP (ref 8.6–10.2)
CHLORIDE SERPL-SCNC: 105 MMOL/L — SIGNIFICANT CHANGE UP (ref 98–107)
CO2 SERPL-SCNC: 24 MMOL/L — SIGNIFICANT CHANGE UP (ref 22–29)
CREAT SERPL-MCNC: 0.99 MG/DL — SIGNIFICANT CHANGE UP (ref 0.5–1.3)
GLUCOSE SERPL-MCNC: 152 MG/DL — HIGH (ref 70–115)
HCT VFR BLD CALC: 38 % — SIGNIFICANT CHANGE UP (ref 37–47)
HGB BLD-MCNC: 12.4 G/DL — SIGNIFICANT CHANGE UP (ref 12–16)
INR BLD: 1.03 RATIO — SIGNIFICANT CHANGE UP (ref 0.88–1.16)
MAGNESIUM SERPL-MCNC: 1.8 MG/DL — SIGNIFICANT CHANGE UP (ref 1.6–2.6)
MCHC RBC-ENTMCNC: 27.1 PG — SIGNIFICANT CHANGE UP (ref 27–31)
MCHC RBC-ENTMCNC: 32.6 G/DL — SIGNIFICANT CHANGE UP (ref 32–36)
MCV RBC AUTO: 83.2 FL — SIGNIFICANT CHANGE UP (ref 81–99)
PHOSPHATE SERPL-MCNC: 3.3 MG/DL — SIGNIFICANT CHANGE UP (ref 2.4–4.7)
PLATELET # BLD AUTO: 188 K/UL — SIGNIFICANT CHANGE UP (ref 150–400)
POTASSIUM SERPL-MCNC: 4.1 MMOL/L — SIGNIFICANT CHANGE UP (ref 3.5–5.3)
POTASSIUM SERPL-SCNC: 4.1 MMOL/L — SIGNIFICANT CHANGE UP (ref 3.5–5.3)
PROTHROM AB SERPL-ACNC: 11.3 SEC — SIGNIFICANT CHANGE UP (ref 9.8–12.7)
RBC # BLD: 4.57 M/UL — SIGNIFICANT CHANGE UP (ref 4.4–5.2)
RBC # FLD: 14.4 % — SIGNIFICANT CHANGE UP (ref 11–15.6)
SODIUM SERPL-SCNC: 145 MMOL/L — SIGNIFICANT CHANGE UP (ref 135–145)
WBC # BLD: 10.2 K/UL — SIGNIFICANT CHANGE UP (ref 4.8–10.8)
WBC # FLD AUTO: 10.2 K/UL — SIGNIFICANT CHANGE UP (ref 4.8–10.8)

## 2018-10-22 PROCEDURE — 99223 1ST HOSP IP/OBS HIGH 75: CPT

## 2018-10-22 PROCEDURE — 99291 CRITICAL CARE FIRST HOUR: CPT

## 2018-10-22 PROCEDURE — 93010 ELECTROCARDIOGRAM REPORT: CPT

## 2018-10-22 RX ADMIN — CHLORHEXIDINE GLUCONATE 15 MILLILITER(S): 213 SOLUTION TOPICAL at 17:36

## 2018-10-22 RX ADMIN — CHLORHEXIDINE GLUCONATE 15 MILLILITER(S): 213 SOLUTION TOPICAL at 05:18

## 2018-10-22 RX ADMIN — NICARDIPINE HYDROCHLORIDE 12.5 MG/HR: 30 CAPSULE, EXTENDED RELEASE ORAL at 05:18

## 2018-10-22 RX ADMIN — NICARDIPINE HYDROCHLORIDE 12.5 MG/HR: 30 CAPSULE, EXTENDED RELEASE ORAL at 09:16

## 2018-10-22 RX ADMIN — PANTOPRAZOLE SODIUM 40 MILLIGRAM(S): 20 TABLET, DELAYED RELEASE ORAL at 12:10

## 2018-10-22 RX ADMIN — NICARDIPINE HYDROCHLORIDE 12.5 MG/HR: 30 CAPSULE, EXTENDED RELEASE ORAL at 21:31

## 2018-10-22 NOTE — DIETITIAN INITIAL EVALUATION ADULT. - ETIOLOGY
related to inability to consume sufficient protein-energy associated with massive intraparenchymal hemorrhage and acute respiratory failure requiring intubation

## 2018-10-22 NOTE — CONSULT NOTE ADULT - PROBLEM SELECTOR RECOMMENDATION 9
Controlling Hypertension  NPO- Control secretions    Holding Anticoagulant for AFIB    Will do F/U CT Scan of Brain if necesaary

## 2018-10-22 NOTE — CONSULT NOTE ADULT - PROBLEM SELECTOR RECOMMENDATION 5
I will meet with family when they return to the bedside.  Allow them to speak, vent and steer them toward "comfort care" ASAP  Palliative Team including Elvira Gunn our SW will work closely with the family

## 2018-10-22 NOTE — PROGRESS NOTE ADULT - ASSESSMENT
89F with a large non-traumatic, spontaneous hemorrhagic CVA, with intraparenchymal and intraventricular hemorrhage. With uncontrolled HTN and respiratory failure.    1: Non-traumatic hemorrhagic CVA (IPH/IVH) complicated by ac encephalopathy  2: Acute hypoxic resp failure  3: A fib  4: DM-2    Neuro sx evaluated and not sx candidate  Not able to assess neuro exam off of sedatives but does have brain stem reflexes and yet requiring complete vent support  Goals of care: DNR/DNI  Need further collaboration with palliative about possibility of terminal extubation with grim prognosis, will follow  VAP and stroke protocol in the mean time

## 2018-10-22 NOTE — CONSULT NOTE ADULT - SUBJECTIVE AND OBJECTIVE BOX
HPI: This is a 67 yo F with PMH of HTN, AFIB, DM, CVA was found at home unconscious and vomiting. Admitted with Extensive Hemorrhagic Stroke and Acute  Respiratory Failure on Vent Support. She is only reactive to noxious stimuli. Patient has a very poor prognosis, Intensivists have had conversations with Danielle  last night  and daughter this morning. They understand how serious her condition is, have designated her to be DNR and DNI-(no reintubation.)   Meeting again with family on a daily basis to discuss, lack of improvement in consciousness, remains Vent dependent with unstable HTN.    CC: Extensive R Hemorrhagic stroke Remains Vent dependent and Unresponsive-    PERTINENT PMH REVIEWED: Yes     PAST MEDICAL & SURGICAL HISTORY:  Oral phase dysphagia  Fall, initial encounter  SDH (subdural hematoma)  CVA (cerebral vascular accident)  Diabetes  Atrial fibrillation  High cholesterol  Hypertension  Delivered by  section  S/P tonsillectomy      SOCIAL HISTORY:  EtOH  No                                    Drugs   No                                   nonsmoker                                    Admitted from: home  Lives with Jack (son)  HCP is Dalia (Dtr)  Meena is Danielle [phone]  FAMILY HISTORY:  No pertinent family history in first degree relatives    No Known Drug Allergies  Shellfish (Rash)    Baseline ADLs (prior to admission):  Independent/ Dependent  having difficulty ambulating at home    Present Symptoms:     Dyspnea: 3  Full Vent Support  Nausea/Vomiting:  No  Anxiety:   No  Depression: No  Fatigue: Yes   Loss of appetite: Yes     Pain: No painful behaviors demonstrated            Character-            Duration-            Effect-            Factors-            Frequency-            Location-            Severity-    Review of Systems: Reviewed                     Unable to obtain due to poor mentation       MEDICATIONS  (STANDING):  chlorhexidine 0.12% Liquid 15 milliLiter(s) Oral Mucosa two times a day  niCARdipine Infusion 2.5 mG/Hr (12.5 mL/Hr) IV Continuous <Continuous>  pantoprazole  Injectable 40 milliGRAM(s) IV Push daily  propofol Infusion 25 MICROgram(s)/kG/Min (5.505 mL/Hr) IV Continuous <Continuous>    MEDICATIONS  (PRN):  fentaNYL    Injectable 50 MICROGram(s) IV Push every 2 hours PRN pain / agitation in intubated patient      PHYSICAL EXAM:    Vital Signs Last 24 Hrs  T(C): 38.2 (22 Oct 2018 12:00), Max: 38.2 (22 Oct 2018 12:00)  T(F): 100.8 (22 Oct 2018 12:00), Max: 100.8 (22 Oct 2018 12:00)  HR: 93 (22 Oct 2018 12:00) (58 - 100)  BP: 156/74 (22 Oct 2018 12:00) (113/56 - 207/92)  BP(mean): 107 (22 Oct 2018 12:00) (81 - 132)  RR: 26 (22 Oct 2018 12:00) (14 - 26)  SpO2: 100% (22 Oct 2018 12:00) (99% - 100%)    General: _ lethargic                  nonverbal  coma    HEENT: dry mouth  ET tube    Lungs:  tachypnea/labored breathing  excessive secretions    CV:  tachycardia    GI: normal  distended  tender                 constipation  last BM:     :  jose    MSK: weakness              bedbound/    Skin: normal  no rash    LABS:                        12.4   10.2  )-----------( 188      ( 22 Oct 2018 06:14 )             38.0     10-    145  |  105  |  24.0<H>  ----------------------------<  152<H>  4.1   |  24.0  |  0.99    Ca    9.3      22 Oct 2018 06:14  Phos  3.3     10-22  Mg     1.8     10-    TPro  7.3  /  Alb  4.2  /  TBili  0.6  /  DBili  x   /  AST  36<H>  /  ALT  10  /  AlkPhos  90  10-21    PT/INR - ( 22 Oct 2018 06:14 )   PT: 11.3 sec;   INR: 1.03 ratio         PTT - ( 22 Oct 2018 06:14 )  PTT:31.6 sec  Urinalysis Basic - ( 21 Oct 2018 16:12 )    Color: Yellow / Appearance: Clear / S.010 / pH: x  Gluc: x / Ketone: Trace  / Bili: Negative / Urobili: Negative mg/dL   Blood: x / Protein: 500 mg/dL / Nitrite: Positive   Leuk Esterase: Trace / RBC: 11-25 /HPF / WBC 6-10   Sq Epi: x / Non Sq Epi: Occasional / Bacteria: Many      I&O's Summary    21 Oct 2018 07:01  -  22 Oct 2018 07:00  --------------------------------------------------------  IN: 105.1 mL / OUT: 490 mL / NET: -384.9 mL    22 Oct 2018 07:01  -  22 Oct 2018 12:48  --------------------------------------------------------  IN: 75 mL / OUT: 90 mL / NET: -15 mL        RADIOLOGY & ADDITIONAL STUDIES:    ADVANCE DIRECTIVES:   DNR YES   Completed on:                     MOLST  YES    Completed on: 10/21/2018  Living Will   NO   Completed on:

## 2018-10-22 NOTE — DIETITIAN INITIAL EVALUATION ADULT. - OTHER INFO
89 yo female with afib on ASA, prior CVA, prior SDH, DM, who presented with altered mental status and found to have a massive intraparenchymal hemorrhage with intraventricular extension, midline shift, and obstructive hydrocephalus. Pt intubated and sedated, no family at bedside to obtain nutrition hx. Per documentation, pt with poor prognosis.

## 2018-10-22 NOTE — PROGRESS NOTE ADULT - SUBJECTIVE AND OBJECTIVE BOX
Patient is a 89y old  Female who presents with a chief complaint of Hemorrhagic CVA (21 Oct 2018 22:22)      BRIEF HOSPITAL COURSE:  89F with HTN, dementia, was brought to the ER after being found this morning unarousable and sonorously breathing in her bed. SHe was last seen normal at 8pm last night.  In the ER she was intubated for airway protection., She was unresponsive to voice or command and only withdrew to painful stimuli. Her BP was noted to be greater than 200. A STAT head Showed diffuse and extensive intra-parenchymal and intraventricular with leftward shift and deviation.  Coags were noted to be normal and IV Nicardipine was initiated for BP control.       Events last 24 hours: Last night patient's family came and goals of care changed to DNR awaiting further decision making    PAST MEDICAL & SURGICAL HISTORY:  Oral phase dysphagia  Fall, initial encounter  SDH (subdural hematoma)  CVA (cerebral vascular accident)  Diabetes  Atrial fibrillation  High cholesterol  Hypertension  Delivered by  section  S/P tonsillectomy      Review of Systems:  Could not obtain ROS due to underlying mentation      Medications:    niCARdipine Infusion 2.5 mG/Hr IV Continuous <Continuous>      fentaNYL    Injectable 50 MICROGram(s) IV Push every 2 hours PRN  propofol Infusion 25 MICROgram(s)/kG/Min IV Continuous <Continuous>        pantoprazole  Injectable 40 milliGRAM(s) IV Push daily            chlorhexidine 0.12% Liquid 15 milliLiter(s) Oral Mucosa two times a day        Mode: AC/ CMV (Assist Control/ Continuous Mandatory Ventilation)  RR (machine): 14  TV (machine): 360  FiO2: 40  PEEP: 5  MAP: 8  PIP: 22      ICU Vital Signs Last 24 Hrs  T(C): 37.9 (22 Oct 2018 15:45), Max: 38.2 (22 Oct 2018 12:00)  T(F): 100.3 (22 Oct 2018 15:45), Max: 100.8 (22 Oct 2018 12:00)  HR: 90 (22 Oct 2018 13:46) (64 - 100)  BP: 140/66 (22 Oct 2018 13:30) (116/57 - 207/92)  BP(mean): 94 (22 Oct 2018 13:30) (81 - 132)  ABP: --  ABP(mean): --  RR: 15 (22 Oct 2018 13:30) (14 - 26)  SpO2: 100% (22 Oct 2018 13:46) (99% - 100%)      ABG - ( 21 Oct 2018 12:09 )  pH, Arterial: 7.37  pH, Blood: x     /  pCO2: 40    /  pO2: 163   / HCO3: 23    / Base Excess: -2.0  /  SaO2: 100                 I&O's Detail    21 Oct 2018 07:01  -  22 Oct 2018 07:00  --------------------------------------------------------  IN:    niCARdipine Infusion: 87.5 mL    propofol Infusion: 17.6 mL  Total IN: 105.1 mL    OUT:    Indwelling Catheter - Urethral: 365 mL    Nasoenteral Tube: 125 mL  Total OUT: 490 mL    Total NET: -384.9 mL      22 Oct 2018 07:01  -  22 Oct 2018 15:46  --------------------------------------------------------  IN:    niCARdipine Infusion: 87.5 mL  Total IN: 87.5 mL    OUT:    Indwelling Catheter - Urethral: 112 mL  Total OUT: 112 mL    Total NET: -24.5 mL            LABS:                        12.4   10.2  )-----------( 188      ( 22 Oct 2018 06:14 )             38.0     10    145  |  105  |  24.0<H>  ----------------------------<  152<H>  4.1   |  24.0  |  0.99    Ca    9.3      22 Oct 2018 06:14  Phos  3.3     10-  Mg     1.8     10-22    TPro  7.3  /  Alb  4.2  /  TBili  0.6  /  DBili  x   /  AST  36<H>  /  ALT  10  /  AlkPhos  90  10-          CAPILLARY BLOOD GLUCOSE        PT/INR - ( 22 Oct 2018 06:14 )   PT: 11.3 sec;   INR: 1.03 ratio         PTT - ( 22 Oct 2018 06:14 )  PTT:31.6 sec  Urinalysis Basic - ( 21 Oct 2018 16:12 )    Color: Yellow / Appearance: Clear / S.010 / pH: x  Gluc: x / Ketone: Trace  / Bili: Negative / Urobili: Negative mg/dL   Blood: x / Protein: 500 mg/dL / Nitrite: Positive   Leuk Esterase: Trace / RBC: 11-25 /HPF / WBC 6-10   Sq Epi: x / Non Sq Epi: Occasional / Bacteria: Many      CULTURES:  Culture Results:   >100,000 CFU/ml Gram Negative Rods Identification and susceptibility to  follow.  Culture in progress (10-21-18 @ 16:13)      Physical Examination:  General: Intubated and nonresponsive to verbal stimuli. No spontaneous movements    HEENT: No signs of trauma or acute injury. Pupils 4 mm B/L and sluggishly responsive  Symmetric.    PULM: Good equal BS decreased at bases     CVS: Regular rate and rhythm, no murmurs, rubs, or gallops    ABD: Soft, nondistended, nontender, normoactive bowel sounds, no masses    EXT: No edema, nontender    Neuro: Unresponsive to verbal stimuli, mild withdrawl to painful stimuli only, no spontaneous movements     SKIN: Warm and well perfused, no rashes noted.

## 2018-10-22 NOTE — CONSULT NOTE ADULT - ATTENDING COMMENTS
COUNSELING:    Face to face meeting to discuss Advanced Care Planning - Time Spent ______ Minutes.  See goals of care note.    More than 50% time spent in counseling and coordinating care. _45_____ Minutes.     Thank you for the opportunity to assist with the care of this patient.   [facility] [phone].

## 2018-10-22 NOTE — DIETITIAN INITIAL EVALUATION ADULT. - NS AS NUTRI INTERV ENTERAL NUTRITION
Per pt family wishes, recommend initiation of Glucerna 1.5 at 20 ml/hr, and advance 20 ml/hr q4 hrs until goal rate of 50 ml/hr (x20 hrs) to provide 1000 ml, 1500 kcal, 82.5g protein, 759 ml free water, and 100% of RDIs for vitamins/minerals. Additional free water per MD discretion

## 2018-10-22 NOTE — DIETITIAN INITIAL EVALUATION ADULT. - PERTINENT LABORATORY DATA
10-22 Na145 mmol/L Glu 152 mg/dL<H> K+ 4.1 mmol/L Cr  0.99 mg/dL BUN 24.0 mg/dL<H> Phos 3.3 mg/dL Alb n/a   PAB n/a

## 2018-10-22 NOTE — CHART NOTE - NSCHARTNOTEFT_GEN_A_CORE
-patients daughter arrived to [facility], patient's son, and grandaughter also tee.  -inquiring about adalid's condition.  -I explained to them that she had a catastrophic Intracranial hemmorhage, most likely due to underlying, uncontrolled hypertension.   _I explained that the bleed was extremely large, and was causing her neurologic symtpoms (non-interactive, no spotnaneous breathing). I explained that due to the nature and size of the bleed that the patient was extremely unlikley to ever regain her neurologic exam, or return to a baseline metnal status.  -The more likely situation was that patient would remain in her current state, non interactive, requiring ventilator support. I showed them how patient was not able to breathe on her own, and showed them neuro exam.  -they seem to understand the patients extremely poor pronosis, but are not ready to make a decision about pallaitive care/comfort care at this time, as they seem to be hoklding on to possibility of a miracle overnight.  -however they did agree and confirm DNR status as well as DNI (if we take out ETT we will not replace).    -explaiend to [facility] that adalid will likely never be able to liberate from ventilaotr and that she will require a tracheostomy and facility palcement. They said she would ot want this.  -they asked what there options were, I exaplined that neurosurgical team would not be offering any surgical interventions and that all we could do at this point was continue with the ventilator, and control her blood pressure.  -they asked about repeat head CT, which I said was soemthing we could do, but at this time would not offer any new information, they understood.  -the other option I explained to [facility] was a comfort care scenaio, where we would ensure patient comfort and libertae her from ventilator, but this option would ultimately end with adalid passsing away as she is not breathing on her own.  -they understood this, but are not ready to make this decision at this time.     PLAN:  -continue full vent support, titrate settings of vent to ensure adequate pH and palteu pressure  -control blood pressure with cardene drip goal -180  -continue discussions of goals of care each day  -formal palliative consult       CRITICAL CARE TIME SPENT:  40 minutes of critical care time spent providing medical care for patient's acute illness/conditions that impairs at least one vital organ system and/or poses a high risk of imminent or life threatening deterioration in the patient's condition. It includes time spent evaluating and treating the patient's acute illness as well as time spent reviewing labs, radiology, discussing goals of care with patient and/or patient's family, and discussing the case with a multidisciplinary team, including the eICU, in an effort to prevent further life threatening deterioration or end organ damage. This time is independent of any procedures performed.

## 2018-10-23 DIAGNOSIS — Z51.5 ENCOUNTER FOR PALLIATIVE CARE: ICD-10-CM

## 2018-10-23 DIAGNOSIS — S06.5X9A TRAUMATIC SUBDURAL HEMORRHAGE WITH LOSS OF CONSCIOUSNESS OF UNSPECIFIED DURATION, INITIAL ENCOUNTER: ICD-10-CM

## 2018-10-23 LAB
-  AMIKACIN: SIGNIFICANT CHANGE UP
-  AMPICILLIN/SULBACTAM: SIGNIFICANT CHANGE UP
-  AMPICILLIN: SIGNIFICANT CHANGE UP
-  AZTREONAM: SIGNIFICANT CHANGE UP
-  CEFAZOLIN: SIGNIFICANT CHANGE UP
-  CEFEPIME: SIGNIFICANT CHANGE UP
-  CEFOXITIN: SIGNIFICANT CHANGE UP
-  CEFTRIAXONE: SIGNIFICANT CHANGE UP
-  CIPROFLOXACIN: SIGNIFICANT CHANGE UP
-  ERTAPENEM: SIGNIFICANT CHANGE UP
-  GENTAMICIN: SIGNIFICANT CHANGE UP
-  IMIPENEM: SIGNIFICANT CHANGE UP
-  LEVOFLOXACIN: SIGNIFICANT CHANGE UP
-  MEROPENEM: SIGNIFICANT CHANGE UP
-  NITROFURANTOIN: SIGNIFICANT CHANGE UP
-  PIPERACILLIN/TAZOBACTAM: SIGNIFICANT CHANGE UP
-  TIGECYCLINE: SIGNIFICANT CHANGE UP
-  TOBRAMYCIN: SIGNIFICANT CHANGE UP
-  TRIMETHOPRIM/SULFAMETHOXAZOLE: SIGNIFICANT CHANGE UP
ANION GAP SERPL CALC-SCNC: 16 MMOL/L — SIGNIFICANT CHANGE UP (ref 5–17)
BUN SERPL-MCNC: 36 MG/DL — HIGH (ref 8–20)
CALCIUM SERPL-MCNC: 9.2 MG/DL — SIGNIFICANT CHANGE UP (ref 8.6–10.2)
CHLORIDE SERPL-SCNC: 104 MMOL/L — SIGNIFICANT CHANGE UP (ref 98–107)
CO2 SERPL-SCNC: 22 MMOL/L — SIGNIFICANT CHANGE UP (ref 22–29)
CREAT SERPL-MCNC: 1.17 MG/DL — SIGNIFICANT CHANGE UP (ref 0.5–1.3)
CULTURE RESULTS: SIGNIFICANT CHANGE UP
GLUCOSE SERPL-MCNC: 164 MG/DL — HIGH (ref 70–115)
HCT VFR BLD CALC: 34.4 % — LOW (ref 37–47)
HGB BLD-MCNC: 11.3 G/DL — LOW (ref 12–16)
INR BLD: 1.06 RATIO — SIGNIFICANT CHANGE UP (ref 0.88–1.16)
MAGNESIUM SERPL-MCNC: 2.2 MG/DL — SIGNIFICANT CHANGE UP (ref 1.6–2.6)
MCHC RBC-ENTMCNC: 27.3 PG — SIGNIFICANT CHANGE UP (ref 27–31)
MCHC RBC-ENTMCNC: 32.8 G/DL — SIGNIFICANT CHANGE UP (ref 32–36)
MCV RBC AUTO: 83.1 FL — SIGNIFICANT CHANGE UP (ref 81–99)
METHOD TYPE: SIGNIFICANT CHANGE UP
ORGANISM # SPEC MICROSCOPIC CNT: SIGNIFICANT CHANGE UP
ORGANISM # SPEC MICROSCOPIC CNT: SIGNIFICANT CHANGE UP
PHOSPHATE SERPL-MCNC: 2.6 MG/DL — SIGNIFICANT CHANGE UP (ref 2.4–4.7)
PLATELET # BLD AUTO: 189 K/UL — SIGNIFICANT CHANGE UP (ref 150–400)
POTASSIUM SERPL-MCNC: 3.7 MMOL/L — SIGNIFICANT CHANGE UP (ref 3.5–5.3)
POTASSIUM SERPL-SCNC: 3.7 MMOL/L — SIGNIFICANT CHANGE UP (ref 3.5–5.3)
PROTHROM AB SERPL-ACNC: 11.7 SEC — SIGNIFICANT CHANGE UP (ref 9.8–12.7)
RBC # BLD: 4.14 M/UL — LOW (ref 4.4–5.2)
RBC # FLD: 14.9 % — SIGNIFICANT CHANGE UP (ref 11–15.6)
SODIUM SERPL-SCNC: 142 MMOL/L — SIGNIFICANT CHANGE UP (ref 135–145)
SPECIMEN SOURCE: SIGNIFICANT CHANGE UP
WBC # BLD: 10.3 K/UL — SIGNIFICANT CHANGE UP (ref 4.8–10.8)
WBC # FLD AUTO: 10.3 K/UL — SIGNIFICANT CHANGE UP (ref 4.8–10.8)

## 2018-10-23 PROCEDURE — 95819 EEG AWAKE AND ASLEEP: CPT | Mod: 26

## 2018-10-23 PROCEDURE — 99291 CRITICAL CARE FIRST HOUR: CPT

## 2018-10-23 PROCEDURE — 99233 SBSQ HOSP IP/OBS HIGH 50: CPT

## 2018-10-23 RX ORDER — ACETAMINOPHEN 500 MG
650 TABLET ORAL EVERY 6 HOURS
Qty: 0 | Refills: 0 | Status: DISCONTINUED | OUTPATIENT
Start: 2018-10-23 | End: 2018-11-03

## 2018-10-23 RX ORDER — CEFTRIAXONE 500 MG/1
1 INJECTION, POWDER, FOR SOLUTION INTRAMUSCULAR; INTRAVENOUS ONCE
Qty: 0 | Refills: 0 | Status: COMPLETED | OUTPATIENT
Start: 2018-10-23 | End: 2018-10-23

## 2018-10-23 RX ORDER — CEFTRIAXONE 500 MG/1
INJECTION, POWDER, FOR SOLUTION INTRAMUSCULAR; INTRAVENOUS
Qty: 0 | Refills: 0 | Status: DISCONTINUED | OUTPATIENT
Start: 2018-10-23 | End: 2018-10-27

## 2018-10-23 RX ORDER — CEFTRIAXONE 500 MG/1
1 INJECTION, POWDER, FOR SOLUTION INTRAMUSCULAR; INTRAVENOUS EVERY 24 HOURS
Qty: 0 | Refills: 0 | Status: DISCONTINUED | OUTPATIENT
Start: 2018-10-24 | End: 2018-10-27

## 2018-10-23 RX ORDER — HYDRALAZINE HCL 50 MG
10 TABLET ORAL EVERY 4 HOURS
Qty: 0 | Refills: 0 | Status: DISCONTINUED | OUTPATIENT
Start: 2018-10-23 | End: 2018-11-02

## 2018-10-23 RX ADMIN — NICARDIPINE HYDROCHLORIDE 12.5 MG/HR: 30 CAPSULE, EXTENDED RELEASE ORAL at 06:57

## 2018-10-23 RX ADMIN — PANTOPRAZOLE SODIUM 40 MILLIGRAM(S): 20 TABLET, DELAYED RELEASE ORAL at 11:22

## 2018-10-23 RX ADMIN — Medication 650 MILLIGRAM(S): at 06:51

## 2018-10-23 RX ADMIN — CHLORHEXIDINE GLUCONATE 15 MILLILITER(S): 213 SOLUTION TOPICAL at 06:33

## 2018-10-23 RX ADMIN — Medication 650 MILLIGRAM(S): at 19:22

## 2018-10-23 RX ADMIN — Medication 650 MILLIGRAM(S): at 18:18

## 2018-10-23 RX ADMIN — CEFTRIAXONE 100 GRAM(S): 500 INJECTION, POWDER, FOR SOLUTION INTRAMUSCULAR; INTRAVENOUS at 10:20

## 2018-10-23 RX ADMIN — Medication 650 MILLIGRAM(S): at 07:34

## 2018-10-23 RX ADMIN — CHLORHEXIDINE GLUCONATE 15 MILLILITER(S): 213 SOLUTION TOPICAL at 17:54

## 2018-10-23 RX ADMIN — Medication 10 MILLIGRAM(S): at 23:09

## 2018-10-23 NOTE — PROGRESS NOTE ADULT - ASSESSMENT
89F with a large non-traumatic, spontaneous hemorrhagic CVA, with intraparenchymal and intraventricular hemorrhage. With uncontrolled HTN and respiratory failure.    1: Non-traumatic hemorrhagic CVA (IPH/IVH) complicated by ac encephalopathy  2: Acute hypoxic resp failure  3: A fib  4: DM-2      Neuro:   Remains comatose (BS reflexes +) off of sedative for > 48 hours   will get EEG  No neurosx intervention  D/c'd nicardipine drip  Poor prognosis confirmed by neurology  Palliative care involved and input appreciated: currently DNR  I tried talking to patient's son but could not have meaningful discussion and he will get back to us about possibly having family meeting    Cardiovascular:   HD stable for now    Resp/Chest:   Remains on full volume AC mode, failed PSV/ SBT in AM  VAP ppx bundle    Gi/Nutrition:   NPO for now  PPI    ID:   Febrile (could be secondary to acute UTI and IPH)  Started on 5 day course of IV CTX    Nephro/Electrolyte/Acid-Base:   Stable    Endocrinology:   Stable    Haem/Oncology:   SCD

## 2018-10-23 NOTE — CONSULT NOTE ADULT - SUBJECTIVE AND OBJECTIVE BOX
CHIEF COMPLAINT: bleed    HPI: 89yFemale    · HPI Objective Statement: This is an 89F w/Hx of Afib on aspirin, prior CVA, DM, Subdural hematoma, presents for unresponsive. Pt was last normal last night found unresponsive in bed by family this morning. Usually does not wake up until 10am so they didn't come to see her until after. EMS reports blown pupils but at bedside says he confused her arcus senilis for a cornea. Pt withdrawing from pain per EMS, .	    CT revealed ICH.  Seen by neurosurgery, palliative care. Asked to assess    PAST MEDICAL & SURGICAL HISTORY:  Oral phase dysphagia  Fall, initial encounter  SDH (subdural hematoma)  CVA (cerebral vascular accident)  Diabetes  Atrial fibrillation  High cholesterol  Hypertension  Delivered by  section  S/P tonsillectomy    MEDICATIONS  (STANDING):  cefTRIAXone   IVPB      chlorhexidine 0.12% Liquid 15 milliLiter(s) Oral Mucosa two times a day  niCARdipine Infusion 2.5 mG/Hr (12.5 mL/Hr) IV Continuous <Continuous>  pantoprazole  Injectable 40 milliGRAM(s) IV Push daily  propofol Infusion 25 MICROgram(s)/kG/Min (5.505 mL/Hr) IV Continuous <Continuous>    MEDICATIONS  (PRN):  acetaminophen    Suspension .. 650 milliGRAM(s) Oral every 6 hours PRN Temp greater or equal to 38C (100.4F)  fentaNYL    Injectable 50 MICROGram(s) IV Push every 2 hours PRN pain / agitation in intubated patient  hydrALAZINE Injectable 10 milliGRAM(s) IV Push every 4 hours PRN SBP > 170    Allergies    No Known Drug Allergies  Shellfish (Rash)    Intolerances        FAMILY HISTORY:  No pertinent family history in first degree relatives          SOCIAL HISTORY:    Tobacco:  no  Alcohol:  no  Drugs:  no        REVIEW OF SYSTEMS:    Relevant systems are negative except as noted in the chart, HPI, and PMH      VITAL SIGNS:  Vital Signs Last 24 Hrs  T(C): 37.3 (23 Oct 2018 10:00), Max: 38.5 (23 Oct 2018 04:00)  T(F): 99.1 (23 Oct 2018 10:00), Max: 101.3 (23 Oct 2018 04:00)  HR: 79 (23 Oct 2018 11:00) (79 - 109)  BP: 114/57 (23 Oct 2018 11:00) (102/51 - 189/83)  BP(mean): 82 (23 Oct 2018 11:00) (73 - 125)  RR: 14 (23 Oct 2018 11:00) (14 - 52)  SpO2: 100% (23 Oct 2018 11:00) (98% - 100%)    PHYSICAL EXAMINATION:    General: Well-developed, well nourished, in no acute distress.  Cardiac:  Regular rate and rhythm. No carotid bruits appreciated.  Eyes: Fundoscopic examination was deferred.  Neurologic:  - Mental Status:  Alert, awake, oriented to person, place, and time; Speech is fluent. Language is normal. Follows commands well.  Insight and knowledge appear appropriate.  Cranial Nerves II-XII:    II:  Visual acuity is normal for age ; Visual fields are full to confrontation; Pupils are equal, round, and reactive to light.  III, IV, VI:  Extraocular movements are intact without nystagmus.  V:  Facial sensation is intact in the V1-V3 distribution bilaterally.  VII:  Face is symmetric with normal eye closure and smile  VIII:  Hearing is grossly intact  IX, X, XII:  speech is clear  XI:  Head turning and shoulder shrug are intact.  - Motor:  Strength is 5/5 x 4.   There is no pronator drift. .  - Reflexes:  2+ and symmetric at the knees.  Plantar responses flexor.  - Sensory:  Symmetric to light touch  - Coordination:  Finger-nose-finger is normal. Rapid alternating hand and foot  movements are intact. Dexterity appears normal      LABS:                          11.3   10.3  )-----------( 189      ( 23 Oct 2018 05:32 )             34.4     23 Oct 2018 05:32    142    |  104    |  36.0   ----------------------------<  164    3.7     |  22.0   |  1.17     Ca    9.2        23 Oct 2018 05:32  Phos  2.6       23 Oct 2018 05:32  Mg     2.2       23 Oct 2018 05:32    TPro  7.3    /  Alb  4.2    /  TBili  0.6    /  DBili  x      /  AST  36     /  ALT  10     /  AlkPhos  90     21 Oct 2018 11:30    LIVER FUNCTIONS - ( 21 Oct 2018 11:30 )  Alb: 4.2 g/dL / Pro: 7.3 g/dL / ALK PHOS: 90 U/L / ALT: 10 U/L / AST: 36 U/L / GGT: x           PT/INR - ( 23 Oct 2018 05:32 )   PT: 11.7 sec;   INR: 1.06 ratio         PTT - ( 22 Oct 2018 06:14 )  PTT:31.6 sec      RADIOLOGY & ADDITIONAL STUDIES:    < from: CT Head No Cont (10.21.18 @ 11:55) >  Extensive acute intraparenchymal and intraventricular hemorrhage with   leftward midline shift and hydrocephalus as described. Acute right   subdural also noted as described.    < end of copied text >    IMPRESSION:      PLAN:  1.   2.   3.   4.  5. CHIEF COMPLAINT: bleed    HPI: 89yFemale    · HPI Objective Statement: This is an 89F w/Hx of Afib on aspirin, prior CVA, DM, Subdural hematoma, presents for unresponsive. Pt was last normal last night found unresponsive in bed by family this morning. Usually does not wake up until 10am so they didn't come to see her until after. EMS reports blown pupils but at bedside says he confused her arcus senilis for a cornea. Pt withdrawing from pain per EMS, .	    CT revealed ICH.  Seen by neurosurgery, palliative care. Asked to assess    PAST MEDICAL & SURGICAL HISTORY:  Oral phase dysphagia  Fall, initial encounter  SDH (subdural hematoma)  CVA (cerebral vascular accident)  Diabetes  Atrial fibrillation  High cholesterol  Hypertension  Delivered by  section  S/P tonsillectomy    MEDICATIONS  (STANDING):  cefTRIAXone   IVPB      chlorhexidine 0.12% Liquid 15 milliLiter(s) Oral Mucosa two times a day  niCARdipine Infusion 2.5 mG/Hr (12.5 mL/Hr) IV Continuous <Continuous>  pantoprazole  Injectable 40 milliGRAM(s) IV Push daily  propofol Infusion 25 MICROgram(s)/kG/Min (5.505 mL/Hr) IV Continuous <Continuous>    MEDICATIONS  (PRN):  acetaminophen    Suspension .. 650 milliGRAM(s) Oral every 6 hours PRN Temp greater or equal to 38C (100.4F)  fentaNYL    Injectable 50 MICROGram(s) IV Push every 2 hours PRN pain / agitation in intubated patient  hydrALAZINE Injectable 10 milliGRAM(s) IV Push every 4 hours PRN SBP > 170    Allergies    No Known Drug Allergies  Shellfish (Rash)    Intolerances        FAMILY HISTORY:  No pertinent family history in first degree relatives          SOCIAL HISTORY:    Tobacco:  no  Alcohol:  no  Drugs:  no        REVIEW OF SYSTEMS:    Relevant systems are negative except as noted in the chart, HPI, and PMH      VITAL SIGNS:  Vital Signs Last 24 Hrs  T(C): 37.3 (23 Oct 2018 10:00), Max: 38.5 (23 Oct 2018 04:00)  T(F): 99.1 (23 Oct 2018 10:00), Max: 101.3 (23 Oct 2018 04:00)  HR: 79 (23 Oct 2018 11:00) (79 - 109)  BP: 114/57 (23 Oct 2018 11:00) (102/51 - 189/83)  BP(mean): 82 (23 Oct 2018 11:00) (73 - 125)  RR: 14 (23 Oct 2018 11:00) (14 - 52)  SpO2: 100% (23 Oct 2018 11:00) (98% - 100%)    PHYSICAL EXAMINATION:    General: Well-developed, well nourished, in no acute distress.  Cardiac:  Regular rate and rhythm. No carotid bruits appreciated.  Eyes: Fundoscopic examination was deferred.  Neurologic:  - Mental Status:  Comatose no verbal response. opens eyes - nonpurposeful to noxious stim. Intubated  Cranial Nerves II-XII:    II:  Pupils r-ight 4mm irregular (iridectomy?) Left 2mm  III, IV, VI:  Extraocular movements -Pos dolls eyes  V:  + corneal  t  - Motor:  Strength - mild triple flesion withdrawal of the LE to noxious stim. No movement of the BUE   - Reflexes:  2+ and symmetric at the knees.  Plantar responses- extensor bilat        LABS:                          11.3   10.3  )-----------( 189      ( 23 Oct 2018 05:32 )             34.4     23 Oct 2018 05:32    142    |  104    |  36.0   ----------------------------<  164    3.7     |  22.0   |  1.17     Ca    9.2        23 Oct 2018 05:32  Phos  2.6       23 Oct 2018 05:32  Mg     2.2       23 Oct 2018 05:32    TPro  7.3    /  Alb  4.2    /  TBili  0.6    /  DBili  x      /  AST  36     /  ALT  10     /  AlkPhos  90     21 Oct 2018 11:30    LIVER FUNCTIONS - ( 21 Oct 2018 11:30 )  Alb: 4.2 g/dL / Pro: 7.3 g/dL / ALK PHOS: 90 U/L / ALT: 10 U/L / AST: 36 U/L / GGT: x           PT/INR - ( 23 Oct 2018 05:32 )   PT: 11.7 sec;   INR: 1.06 ratio         PTT - ( 22 Oct 2018 06:14 )  PTT:31.6 sec      RADIOLOGY & ADDITIONAL STUDIES:    < from: CT Head No Cont (10.21.18 @ 11:55) >  Extensive acute intraparenchymal and intraventricular hemorrhage with   leftward midline shift and hydrocephalus as described. Acute right   subdural also noted as described.    < end of copied text >    IMPRESSION:    Massive CNS bleed  Comatose with expected exam    Extremely poor prognosis for meaningful, functional recovery  REC: Comfort care, Palliative eval  Medical management as per ICU team  Neurosurgery has seen her      PLAN:  1.   2.   3.   4.  5.

## 2018-10-23 NOTE — PROGRESS NOTE ADULT - PROBLEM SELECTOR PLAN 4
Called son Jack Zhang. Son is familiar to me as I had cared for his wife who  earlier this year. Jack is understandably overwhelmed with recent events with his mother, and still grieving  the recent loss of his wife. Offered my support.   He usually cannot get to the hospital until the evening  because of his work schedule. I did ask him if he could try  to set some time during the day  to meet with the team,  either l today or tomorrow. He will get back to me.

## 2018-10-23 NOTE — PROGRESS NOTE ADULT - SUBJECTIVE AND OBJECTIVE BOX
CC: follow up GOC  INTERVAL HPI/OVERNIGHT EVENTS:  intubated  PRESENT SYMPTOMS: SOURCE:  Patient/Family/Team    PAIN SCALE:  0 = none  1 = mild   2 = moderate  3 = severe    Pain:     Dyspnea:  [ ] YES [ ] NO  na on vent  Anxiety:  [ ] YES [ x] NO    Fatigue: [ ] YES [ ] NO  Nausea: [ ] YES [ ] NO  na  Loss of Appetite: [ ] YES [ ] NO  na  Other symptoms: __________    MEDICATIONS  (STANDING):  cefTRIAXone   IVPB      chlorhexidine 0.12% Liquid 15 milliLiter(s) Oral Mucosa two times a day  niCARdipine Infusion 2.5 mG/Hr (12.5 mL/Hr) IV Continuous <Continuous>  pantoprazole  Injectable 40 milliGRAM(s) IV Push daily  propofol Infusion 25 MICROgram(s)/kG/Min (5.505 mL/Hr) IV Continuous <Continuous>    MEDICATIONS  (PRN):  acetaminophen    Suspension .. 650 milliGRAM(s) Oral every 6 hours PRN Temp greater or equal to 38C (100.4F)  fentaNYL    Injectable 50 MICROGram(s) IV Push every 2 hours PRN pain / agitation in intubated patient  hydrALAZINE Injectable 10 milliGRAM(s) IV Push every 4 hours PRN SBP > 170      Allergies    No Known Drug Allergies  Shellfish (Rash)    Intolerances    Karnofsky Performance Score/Palliative Performance Status Version 2:   10      %    Vital Signs Last 24 Hrs  T(C): 37.3 (23 Oct 2018 10:00), Max: 38.5 (23 Oct 2018 04:00)  T(F): 99.1 (23 Oct 2018 10:00), Max: 101.3 (23 Oct 2018 04:00)  HR: 87 (23 Oct 2018 12:28) (79 - 109)  BP: 114/57 (23 Oct 2018 11:00) (102/51 - 189/83)  BP(mean): 82 (23 Oct 2018 11:00) (73 - 125)  RR: 14 (23 Oct 2018 11:00) (14 - 52)  SpO2: 100% (23 Oct 2018 12:28) (98% - 100%)    PHYSICAL EXAM:    General:  WD woman, intubated   HEENT: [x ] normal  [ ] dry mouth  [ x] ET tube/trach    Lungs: [ x] comfortable [ ] tachypnea/labored breathing  [ ] excessive secretions    CV: [ x] normal  [ ] tachycardia    GI: [x ] normal  [ ] distended  [ ] tender  [ ] no BS               [ ] PEG/NG/OG tube    : [ ] normal  [x ] incontinent  [ ] oliguria/anuria  [ x] garcia    MSK: [ ] normal  [ x] weakness  [ ] edema             [ ] ambulatory  [ ] bedbound/wheelchair bound    Skin: [ ] normal  [ ] pressure ulcers- Stage_____  [ x] no rash    Neuro - no responsive    LABS:                        11.3   10.3  )-----------( 189      ( 23 Oct 2018 05:32 )             34.4     10-    142  |  104  |  36.0<H>  ----------------------------<  164<H>  3.7   |  22.0  |  1.17    Ca    9.2      23 Oct 2018 05:32  Phos  2.6     10  Mg     2.2     10      PT/INR - ( 23 Oct 2018 05:32 )   PT: 11.7 sec;   INR: 1.06 ratio         PTT - ( 22 Oct 2018 06:14 )  PTT:31.6 sec  Urinalysis Basic - ( 21 Oct 2018 16:12 )    Color: Yellow / Appearance: Clear / S.010 / pH: x  Gluc: x / Ketone: Trace  / Bili: Negative / Urobili: Negative mg/dL   Blood: x / Protein: 500 mg/dL / Nitrite: Positive   Leuk Esterase: Trace / RBC: 11-25 /HPF / WBC 6-10   Sq Epi: x / Non Sq Epi: Occasional / Bacteria: Many      I&O's Summary    22 Oct 2018 07:  -  23 Oct 2018 07:00  --------------------------------------------------------  IN: 467.5 mL / OUT: 313 mL / NET: 154.5 mL    23 Oct 2018 07:  -  23 Oct 2018 12:36  --------------------------------------------------------  IN: 55 mL / OUT: 55 mL / NET: 0 mL        RADIOLOGY & ADDITIONAL STUDIES:    < from: CT Head No Cont (10.21.18 @ 11:55) >     EXAM:  CT BRAIN                          PROCEDURE DATE:  10/21/2018          INTERPRETATION:  History: Altered mental status, right eye deviation.    Findings: Contiguous axial images from the skull base through the vertex   was performed withoutadministration of intravenous contrast. Sagittal   and coronal reformat images are also provided.    Correlation made with prior study 3/19/2018.    Large right temporal/temporoparietal hemorrhage with large   intraventricular extension distending and nearly filling the entire right   lateral ventricle, third ventricle and fourth ventricle. Blood is seen   also extending into the left lateral ventricle which is also dilated.   There is approximate 1.2 cm leftward midline shift. Gyral hemorrhages.  Acute right subdural blood also seen measuring up to approximately 3.5 mm   width with extension along the sphenoid ridges to the right para   mesencephalic cistern. There is impression upon the right side of the   suprasellar cistern which is otherwise clear. The blood filled fourth   ventricle is midline.    Chronic moderate white matter small vessel ischemic change noted.    The skull is intact. The visualized portion of the paranasal sinuses and   mastoid air cells are clear.    Impression:    Extensive acute intraparenchymal and intraventricular hemorrhage with   leftward midline shift and hydrocephalus as described. Acute right   subdural also noted as described.    Follow-up is recommended.    Critical findings discussed with Dr. Marte on 10/21/2018 at 12:10 PM.      < end of copied text >    Thank you for the opportunity to assist with the care of this patient.   [facility] [phone].

## 2018-10-23 NOTE — PROGRESS NOTE ADULT - SUBJECTIVE AND OBJECTIVE BOX
Patient is a 89y old  Female who presents with a chief complaint of IVH (23 Oct 2018 12:36)      BRIEF HOSPITAL COURSE: 89F with HTN, dementia, was brought to the ER after being found this morning unarousable and sonorously breathing in her bed. SHe was last seen normal at 8pm last night.  In the ER she was intubated for airway protection., She was unresponsive to voice or command and only withdrew to painful stimuli. Her BP was noted to be greater than 200. A STAT head Showed diffuse and extensive intra-parenchymal and intraventricular with leftward shift and deviation.  Coags were noted to be normal and IV Nicardipine was initiated for BP control.   Events last 24 hours: No overnight issues    PAST MEDICAL & SURGICAL HISTORY:  Oral phase dysphagia  Fall, initial encounter  SDH (subdural hematoma)  CVA (cerebral vascular accident)  Diabetes  Atrial fibrillation  High cholesterol  Hypertension  Delivered by  section  S/P tonsillectomy      Review of Systems:  Could not obtain ROS due to underlying mentation      Medications:  cefTRIAXone   IVPB        hydrALAZINE Injectable 10 milliGRAM(s) IV Push every 4 hours PRN  niCARdipine Infusion 2.5 mG/Hr IV Continuous <Continuous>      acetaminophen    Suspension .. 650 milliGRAM(s) Oral every 6 hours PRN  fentaNYL    Injectable 50 MICROGram(s) IV Push every 2 hours PRN  propofol Infusion 25 MICROgram(s)/kG/Min IV Continuous <Continuous>        pantoprazole  Injectable 40 milliGRAM(s) IV Push daily            chlorhexidine 0.12% Liquid 15 milliLiter(s) Oral Mucosa two times a day        Mode: AC/ CMV (Assist Control/ Continuous Mandatory Ventilation)  RR (machine): 14  TV (machine): 360  FiO2: 30  PEEP: 5  PS: 10  MAP: 8  PIP: 23      ICU Vital Signs Last 24 Hrs  T(C): 37.3 (23 Oct 2018 10:00), Max: 38.5 (23 Oct 2018 04:00)  T(F): 99.1 (23 Oct 2018 10:00), Max: 101.3 (23 Oct 2018 04:00)  HR: 86 (23 Oct 2018 13:30) (79 - 109)  BP: 127/58 (23 Oct 2018 13:30) (102/51 - 189/83)  BP(mean): 84 (23 Oct 2018 13:30) (73 - 125)  ABP: --  ABP(mean): --  RR: 14 (23 Oct 2018 13:30) (14 - 52)  SpO2: 100% (23 Oct 2018 13:30) (98% - 100%)          I&O's Detail    22 Oct 2018 07:01  -  23 Oct 2018 07:00  --------------------------------------------------------  IN:    Enteral Tube Flush: 50 mL    niCARdipine Infusion: 417.5 mL  Total IN: 467.5 mL    OUT:    Indwelling Catheter - Urethral: 313 mL  Total OUT: 313 mL    Total NET: 154.5 mL      23 Oct 2018 07:01  -  23 Oct 2018 13:46  --------------------------------------------------------  IN:    niCARdipine Infusion: 55 mL  Total IN: 55 mL    OUT:    Indwelling Catheter - Urethral: 55 mL  Total OUT: 55 mL    Total NET: 0 mL            LABS:                        11.3   10.3  )-----------( 189      ( 23 Oct 2018 05:32 )             34.4     10-    142  |  104  |  36.0<H>  ----------------------------<  164<H>  3.7   |  22.0  |  1.17    Ca    9.2      23 Oct 2018 05:32  Phos  2.6     10-  Mg     2.2     10-23            CAPILLARY BLOOD GLUCOSE        PT/INR - ( 23 Oct 2018 05:32 )   PT: 11.7 sec;   INR: 1.06 ratio         PTT - ( 22 Oct 2018 06:14 )  PTT:31.6 sec  Urinalysis Basic - ( 21 Oct 2018 16:12 )    Color: Yellow / Appearance: Clear / S.010 / pH: x  Gluc: x / Ketone: Trace  / Bili: Negative / Urobili: Negative mg/dL   Blood: x / Protein: 500 mg/dL / Nitrite: Positive   Leuk Esterase: Trace / RBC: 11-25 /HPF / WBC 6-10   Sq Epi: x / Non Sq Epi: Occasional / Bacteria: Many      CULTURES:  Culture Results:   >100,000 CFU/ml Klebsiella oxytoca (10-21-18 @ 16:13)      Physical Examination:  General: Intubated and nonresponsive to verbal stimuli. No spontaneous movements    HEENT: No signs of trauma or acute injury. Pupils 4 mm B/L and sluggishly responsive  Symmetric.    PULM: Good equal BS decreased at bases     CVS: Regular rate and rhythm, no murmurs, rubs, or gallops    ABD: Soft, nondistended, nontender, normoactive bowel sounds, no masses    EXT: No edema, nontender    Neuro: Unresponsive to verbal stimuli, mild withdrawl to painful stimuli only, no spontaneous movements         CRITICAL CARE TIME SPENT: 37

## 2018-10-24 DIAGNOSIS — I10 ESSENTIAL (PRIMARY) HYPERTENSION: ICD-10-CM

## 2018-10-24 DIAGNOSIS — I61.5 NONTRAUMATIC INTRACEREBRAL HEMORRHAGE, INTRAVENTRICULAR: ICD-10-CM

## 2018-10-24 LAB
ALBUMIN SERPL ELPH-MCNC: 3.5 G/DL — SIGNIFICANT CHANGE UP (ref 3.3–5.2)
ALP SERPL-CCNC: 100 U/L — SIGNIFICANT CHANGE UP (ref 40–120)
ALT FLD-CCNC: 12 U/L — SIGNIFICANT CHANGE UP
ANION GAP SERPL CALC-SCNC: 15 MMOL/L — SIGNIFICANT CHANGE UP (ref 5–17)
ANISOCYTOSIS BLD QL: SLIGHT — SIGNIFICANT CHANGE UP
AST SERPL-CCNC: 30 U/L — SIGNIFICANT CHANGE UP
BILIRUB SERPL-MCNC: 0.6 MG/DL — SIGNIFICANT CHANGE UP (ref 0.4–2)
BUN SERPL-MCNC: 43 MG/DL — HIGH (ref 8–20)
CALCIUM SERPL-MCNC: 9.4 MG/DL — SIGNIFICANT CHANGE UP (ref 8.6–10.2)
CHLORIDE SERPL-SCNC: 105 MMOL/L — SIGNIFICANT CHANGE UP (ref 98–107)
CO2 SERPL-SCNC: 24 MMOL/L — SIGNIFICANT CHANGE UP (ref 22–29)
CREAT SERPL-MCNC: 1.21 MG/DL — SIGNIFICANT CHANGE UP (ref 0.5–1.3)
GLUCOSE SERPL-MCNC: 116 MG/DL — HIGH (ref 70–115)
HCT VFR BLD CALC: 34.8 % — LOW (ref 37–47)
HGB BLD-MCNC: 11.4 G/DL — LOW (ref 12–16)
HYPOCHROMIA BLD QL: SLIGHT — SIGNIFICANT CHANGE UP
LYMPHOCYTES # BLD AUTO: 10 % — LOW (ref 20–55)
MACROCYTES BLD QL: SLIGHT — SIGNIFICANT CHANGE UP
MAGNESIUM SERPL-MCNC: 2.3 MG/DL — SIGNIFICANT CHANGE UP (ref 1.6–2.6)
MCHC RBC-ENTMCNC: 27.2 PG — SIGNIFICANT CHANGE UP (ref 27–31)
MCHC RBC-ENTMCNC: 32.8 G/DL — SIGNIFICANT CHANGE UP (ref 32–36)
MCV RBC AUTO: 83.1 FL — SIGNIFICANT CHANGE UP (ref 81–99)
MONOCYTES NFR BLD AUTO: 7 % — SIGNIFICANT CHANGE UP (ref 3–10)
NEUTROPHILS NFR BLD AUTO: 82 % — HIGH (ref 37–73)
NEUTS BAND # BLD: 1 % — SIGNIFICANT CHANGE UP (ref 0–8)
OVALOCYTES BLD QL SMEAR: SLIGHT — SIGNIFICANT CHANGE UP
PHOSPHATE SERPL-MCNC: 2.9 MG/DL — SIGNIFICANT CHANGE UP (ref 2.4–4.7)
PLAT MORPH BLD: NORMAL — SIGNIFICANT CHANGE UP
PLATELET # BLD AUTO: 192 K/UL — SIGNIFICANT CHANGE UP (ref 150–400)
POIKILOCYTOSIS BLD QL AUTO: SLIGHT — SIGNIFICANT CHANGE UP
POTASSIUM SERPL-MCNC: 3.5 MMOL/L — SIGNIFICANT CHANGE UP (ref 3.5–5.3)
POTASSIUM SERPL-SCNC: 3.5 MMOL/L — SIGNIFICANT CHANGE UP (ref 3.5–5.3)
PROT SERPL-MCNC: 6.7 G/DL — SIGNIFICANT CHANGE UP (ref 6.6–8.7)
RBC # BLD: 4.19 M/UL — LOW (ref 4.4–5.2)
RBC # FLD: 15 % — SIGNIFICANT CHANGE UP (ref 11–15.6)
RBC BLD AUTO: ABNORMAL
SODIUM SERPL-SCNC: 144 MMOL/L — SIGNIFICANT CHANGE UP (ref 135–145)
WBC # BLD: 9.6 K/UL — SIGNIFICANT CHANGE UP (ref 4.8–10.8)
WBC # FLD AUTO: 9.6 K/UL — SIGNIFICANT CHANGE UP (ref 4.8–10.8)

## 2018-10-24 PROCEDURE — 99232 SBSQ HOSP IP/OBS MODERATE 35: CPT

## 2018-10-24 PROCEDURE — 99291 CRITICAL CARE FIRST HOUR: CPT

## 2018-10-24 RX ORDER — METOPROLOL TARTRATE 50 MG
5 TABLET ORAL ONCE
Qty: 0 | Refills: 0 | Status: COMPLETED | OUTPATIENT
Start: 2018-10-24 | End: 2018-10-24

## 2018-10-24 RX ORDER — AMLODIPINE BESYLATE 2.5 MG/1
5 TABLET ORAL DAILY
Qty: 0 | Refills: 0 | Status: DISCONTINUED | OUTPATIENT
Start: 2018-10-24 | End: 2018-11-02

## 2018-10-24 RX ADMIN — Medication 10 MILLIGRAM(S): at 16:20

## 2018-10-24 RX ADMIN — CHLORHEXIDINE GLUCONATE 15 MILLILITER(S): 213 SOLUTION TOPICAL at 17:23

## 2018-10-24 RX ADMIN — PANTOPRAZOLE SODIUM 40 MILLIGRAM(S): 20 TABLET, DELAYED RELEASE ORAL at 11:10

## 2018-10-24 RX ADMIN — Medication 5 MILLIGRAM(S): at 05:01

## 2018-10-24 RX ADMIN — AMLODIPINE BESYLATE 5 MILLIGRAM(S): 2.5 TABLET ORAL at 16:20

## 2018-10-24 RX ADMIN — CEFTRIAXONE 100 GRAM(S): 500 INJECTION, POWDER, FOR SOLUTION INTRAMUSCULAR; INTRAVENOUS at 09:17

## 2018-10-24 RX ADMIN — CHLORHEXIDINE GLUCONATE 15 MILLILITER(S): 213 SOLUTION TOPICAL at 06:07

## 2018-10-24 RX ADMIN — Medication 10 MILLIGRAM(S): at 09:17

## 2018-10-24 NOTE — PROGRESS NOTE ADULT - SUBJECTIVE AND OBJECTIVE BOX
Patient is a 89y old  Female who presents with a chief complaint of IVH (23 Oct 2018 12:36)      BRIEF HOSPITAL COURSE: 89F with HTN, dementia, was brought to the ER after being found this morning unarousable and sonorously breathing in her bed. SHe was last seen normal at 8pm last night.  In the ER she was intubated for airway protection., She was unresponsive to voice or command and only withdrew to painful stimuli. Her BP was noted to be greater than 200. A STAT head Showed diffuse and extensive intra-parenchymal and intraventricular with leftward shift and deviation.  Coags were noted to be normal and IV Nicardipine was initiated for BP control.   Events last 24 hours: Overnight patient went in to SVt which responded to IV lopressor    PAST MEDICAL & SURGICAL HISTORY:  Oral phase dysphagia  Fall, initial encounter  SDH (subdural hematoma)  CVA (cerebral vascular accident)  Diabetes  Atrial fibrillation  High cholesterol  Hypertension  Delivered by  section  S/P tonsillectomy      Review of Systems:  Could not obtain ROS due to underlying mentation      Medications:  cefTRIAXone   IVPB      hydrALAZINE Injectable 10 milliGRAM(s) IV Push every 4 hours PRN  niCARdipine Infusion 2.5 mG/Hr IV Continuous <Continuous>  acetaminophen    Suspension .. 650 milliGRAM(s) Oral every 6 hours PRN  fentaNYL    Injectable 50 MICROGram(s) IV Push every 2 hours PRN  propofol Infusion 25 MICROgram(s)/kG/Min IV Continuous <Continuous>  pantoprazole  Injectable 40 milliGRAM(s) IV Push daily  chlorhexidine 0.12% Liquid 15 milliLiter(s) Oral Mucosa two times a day        ICU Vital Signs Last 24 Hrs  T(C): 37.7 (24 Oct 2018 16:05), Max: 38.4 (23 Oct 2018 18:00)  T(F): 99.8 (24 Oct 2018 16:05), Max: 101.1 (23 Oct 2018 18:00)  HR: 87 (24 Oct 2018 14:00) (70 - 180)  BP: 153/64 (24 Oct 2018 14:00) (96/46 - 185/75)  BP(mean): 92 (24 Oct 2018 14:00) (66 - 121)  ABP: --  ABP(mean): --  RR: 15 (24 Oct 2018 14:00) (8 - 28)  SpO2: 100% (24 Oct 2018 14:00) (100% - 100%)  Mode: AC/ CMV (Assist Control/ Continuous Mandatory Ventilation)  RR (machine): 14  TV (machine): 360  FiO2: 30  PEEP: 5  PS: 10  MAP: 8  PIP: 23    I&O's Summary    23 Oct 2018 07:  -  24 Oct 2018 07:00  --------------------------------------------------------  IN: 105 mL / OUT: 255 mL / NET: -150 mL    24 Oct 2018 07:  -  24 Oct 2018 16:14  --------------------------------------------------------  IN: 50 mL / OUT: 0 mL / NET: 50 mL      LABS:             10-24    144  |  105  |  43.0<H>  ----------------------------<  116<H>  3.5   |  24.0  |  1.21    Ca    9.4      24 Oct 2018 05:38  Phos  2.9     10-  Mg     2.3     10-24    TPro  6.7  /  Alb  3.5  /  TBili  0.6  /  DBili  x   /  AST  30  /  ALT  12  /  AlkPhos  100  10-24                          11.4   9.6   )-----------( 192      ( 24 Oct 2018 05:38 )             34.8               CAPILLARY BLOOD GLUCOSE        PT/INR - ( 23 Oct 2018 05:32 )   PT: 11.7 sec;   INR: 1.06 ratio         PTT - ( 22 Oct 2018 06:14 )  PTT:31.6 sec  Urinalysis Basic - ( 21 Oct 2018 16:12 )    Color: Yellow / Appearance: Clear / S.010 / pH: x  Gluc: x / Ketone: Trace  / Bili: Negative / Urobili: Negative mg/dL   Blood: x / Protein: 500 mg/dL / Nitrite: Positive   Leuk Esterase: Trace / RBC: 11-25 /HPF / WBC 6-10   Sq Epi: x / Non Sq Epi: Occasional / Bacteria: Many      CULTURES:  Culture Results:   >100,000 CFU/ml Klebsiella oxytoca (10-21-18 @ 16:13)      Physical Examination:  General: Intubated and nonresponsive to verbal stimuli. No spontaneous movements    HEENT: No signs of trauma or acute injury. Pupils 4 mm B/L and sluggishly responsive  Symmetric.    PULM: Good equal BS decreased at bases     CVS: Regular rate and rhythm, no murmurs, rubs, or gallops    ABD: Soft, nondistended, nontender, normoactive bowel sounds, no masses    EXT: No edema, nontender    Neuro: Unresponsive to verbal stimuli, mild withdrawl to painful stimuli only, no spontaneous movements         CRITICAL CARE TIME SPENT: 35

## 2018-10-24 NOTE — PROGRESS NOTE ADULT - ASSESSMENT
89F with a large non-traumatic, spontaneous hemorrhagic CVA, with intraparenchymal and intraventricular hemorrhage. With uncontrolled HTN and respiratory failure.    1: Non-traumatic hemorrhagic CVA (IPH/IVH) complicated by ac encephalopathy  2: Acute hypoxic resp failure  3: A fib  4: DM-2      Neuro:   Remains comatose (BS reflexes +) off of sedative for > 48 hours  EEG:  burst-suppression  No neurosx intervention  D/c'd nicardipine drip  Poor prognosis confirmed by neurology  Palliative care involved and input appreciated: currently DNR  I tried talking to patient's son but could not have meaningful discussion and he will get back to us about possibly having family meeting/ possible meeting tomorrow     Cardiovascular:   HD stable for now  Will add norvac    Resp/Chest:   Remains on full volume AC mode, failed PSV/ SBT in AM  VAP ppx bundle    Gi/Nutrition:   NPO for now  PPI    ID:   Febrile (could be secondary to acute UTI and IPH)  On 5 day course of IV CTX    Nephro/Electrolyte/Acid-Base:   Stable    Endocrinology:   Stable    Haem/Oncology:   SCD

## 2018-10-24 NOTE — PROGRESS NOTE ADULT - SUBJECTIVE AND OBJECTIVE BOX
CC:  follow up GOC  INTERVAL HPI/OVERNIGHT EVENTS:    PRESENT SYMPTOMS: SOURCE:  Patient/Family/Team    PAIN SCALE:  0 = none  1 = mild   2 = moderate  3 = severe    Pain:     Dyspnea:  [ ] YES [ ] NO  na/ on vent  Anxiety:  [ ] YES [ ] NO n/a  Fatigue: [ ] YES [ ] NO  na  Nausea: [ ] YES [ ] Maddi  Loss of Appetite: [ ] YES [ ] NO  na  Other symptoms: __________    MEDICATIONS  (STANDING):  cefTRIAXone   IVPB      cefTRIAXone   IVPB 1 Gram(s) IV Intermittent every 24 hours  chlorhexidine 0.12% Liquid 15 milliLiter(s) Oral Mucosa two times a day  pantoprazole  Injectable 40 milliGRAM(s) IV Push daily    MEDICATIONS  (PRN):  acetaminophen    Suspension .. 650 milliGRAM(s) Oral every 6 hours PRN Temp greater or equal to 38C (100.4F)  fentaNYL    Injectable 50 MICROGram(s) IV Push every 2 hours PRN pain / agitation in intubated patient  hydrALAZINE Injectable 10 milliGRAM(s) IV Push every 4 hours PRN SBP > 170      Allergies    No Known Drug Allergies  Shellfish (Rash)    Intolerances      Karnofsky Performance Score/Palliative Performance Status Version 2:   10      %    Vital Signs Last 24 Hrs  T(C): 38 (24 Oct 2018 12:07), Max: 38.4 (23 Oct 2018 18:00)  T(F): 100.4 (24 Oct 2018 12:07), Max: 101.1 (23 Oct 2018 18:00)  HR: 86 (24 Oct 2018 12:02) (70 - 180)  BP: 160/68 (24 Oct 2018 12:00) (96/46 - 185/75)  BP(mean): 98 (24 Oct 2018 12:00) (66 - 121)  RR: 18 (24 Oct 2018 12:00) (8 - 28)  SpO2: 100% (24 Oct 2018 12:02) (100% - 100%)    PHYSICAL EXAM:    General: non responsive to tactile or verbal stimuli - on vent    HEENT: [x ] normal  [ ] dry mouth  [ x] ET tube/trach    Lungs: [ x] comfortable [ ] tachypnea/labored breathing  [ ] excessive secretions    CV: [ x] normal  [ ] tachycardia    GI: [ x] normal  [ ] distended  [ ] tender  [ ] no BS               [ ] PEG/NG/OG tube    : [ ] normal  [ x] incontinent  [ ] oliguria/anuria  [ ] garcia    MSK: [ ] normal  [ ] weakness  [ ] edema             [ ] ambulatory  [x ] bedbound/wheelchair bound    Skin: [ ] normal  [ ] pressure ulcers- Stage_____  [x ] no rash    LABS:                        11.4   9.6   )-----------( 192      ( 24 Oct 2018 05:38 )             34.8     10-24    144  |  105  |  43.0<H>  ----------------------------<  116<H>  3.5   |  24.0  |  1.21    Ca    9.4      24 Oct 2018 05:38  Phos  2.9     10-24  Mg     2.3     10-24    TPro  6.7  /  Alb  3.5  /  TBili  0.6  /  DBili  x   /  AST  30  /  ALT  12  /  AlkPhos  100  10-24    PT/INR - ( 23 Oct 2018 05:32 )   PT: 11.7 sec;   INR: 1.06 ratio             I&O's Summary    23 Oct 2018 07:01  -  24 Oct 2018 07:00  --------------------------------------------------------  IN: 105 mL / OUT: 255 mL / NET: -150 mL    24 Oct 2018 07:01  -  24 Oct 2018 13:11  --------------------------------------------------------  IN: 50 mL / OUT: 0 mL / NET: 50 mL        RADIOLOGY & ADDITIONAL STUDIES:      ADVANCE DIRECTIVES:  [ ] YES [ ] NO    DNR: [ ] YES [ ] NO      Date Completed:                    DEB [ ] YES [ ] NO   Date Completed:     COUNSELING:  Advanced Care Planning Discussion - Time Spent ______Minutes.   More than 50% time spent in counseling and coordinating care. ______ Minutes.     Thank you for the opportunity to assist with the care of this patient.   [facility] Consult [facility] [phone].

## 2018-10-24 NOTE — PROGRESS NOTE ADULT - SUBJECTIVE AND OBJECTIVE BOX
INTERVAL HISTORY:  no improvement.      VITAL SIGNS:  Vital Signs Last 24 Hrs  T(C): 38 (24 Oct 2018 12:07), Max: 38.4 (23 Oct 2018 18:00)  T(F): 100.4 (24 Oct 2018 12:07), Max: 101.1 (23 Oct 2018 18:00)  HR: 86 (24 Oct 2018 12:02) (70 - 180)  BP: 160/68 (24 Oct 2018 12:00) (96/46 - 185/75)  BP(mean): 98 (24 Oct 2018 12:00) (66 - 121)  RR: 18 (24 Oct 2018 12:00) (8 - 28)  SpO2: 100% (24 Oct 2018 12:02) (100% - 100%)    PHYSICAL EXAMINATION:    Mentation:    Language/Speech:  CN:  Visual Fields:  Motor:  Sensory:  DTR:  Babinski:      MEDS:  MEDICATIONS  (STANDING):  amLODIPine   Tablet 5 milliGRAM(s) Oral daily  cefTRIAXone   IVPB      cefTRIAXone   IVPB 1 Gram(s) IV Intermittent every 24 hours  chlorhexidine 0.12% Liquid 15 milliLiter(s) Oral Mucosa two times a day  pantoprazole  Injectable 40 milliGRAM(s) IV Push daily    MEDICATIONS  (PRN):  acetaminophen    Suspension .. 650 milliGRAM(s) Oral every 6 hours PRN Temp greater or equal to 38C (100.4F)  fentaNYL    Injectable 50 MICROGram(s) IV Push every 2 hours PRN pain / agitation in intubated patient  hydrALAZINE Injectable 10 milliGRAM(s) IV Push every 4 hours PRN SBP > 170      LABS:                          11.4   9.6   )-----------( 192      ( 24 Oct 2018 05:38 )             34.8     10-24    144  |  105  |  43.0<H>  ----------------------------<  116<H>  3.5   |  24.0  |  1.21    Ca    9.4      24 Oct 2018 05:38  Phos  2.9     10-24  Mg     2.3     10-24    TPro  6.7  /  Alb  3.5  /  TBili  0.6  /  DBili  x   /  AST  30  /  ALT  12  /  AlkPhos  100  10-24    LIVER FUNCTIONS - ( 24 Oct 2018 05:38 )  Alb: 3.5 g/dL / Pro: 6.7 g/dL / ALK PHOS: 100 U/L / ALT: 12 U/L / AST: 30 U/L / GGT: x               RADIOLOGY & ADDITIONAL STUDIES:      EEG- burst-suppression    IMPRESSION & PLAN:      CNS bleed  ICU team to talk to family tonight regarding comfort/withdrawal of care- I concur    Will not actively follow.   Please recontact as needed.

## 2018-10-24 NOTE — PROGRESS NOTE ADULT - PROBLEM SELECTOR PLAN 4
Have not heard back from son, Jack.  He recently loss his wife a few months ago.  Will try to have Palliative  reach out as well.   Recommend family meeting.

## 2018-10-25 LAB
ANION GAP SERPL CALC-SCNC: 17 MMOL/L — SIGNIFICANT CHANGE UP (ref 5–17)
BUN SERPL-MCNC: 53 MG/DL — HIGH (ref 8–20)
CALCIUM SERPL-MCNC: 9.7 MG/DL — SIGNIFICANT CHANGE UP (ref 8.6–10.2)
CHLORIDE SERPL-SCNC: 109 MMOL/L — HIGH (ref 98–107)
CO2 SERPL-SCNC: 23 MMOL/L — SIGNIFICANT CHANGE UP (ref 22–29)
CREAT SERPL-MCNC: 1.13 MG/DL — SIGNIFICANT CHANGE UP (ref 0.5–1.3)
GLUCOSE SERPL-MCNC: 113 MG/DL — SIGNIFICANT CHANGE UP (ref 70–115)
HCT VFR BLD CALC: 36.9 % — LOW (ref 37–47)
HGB BLD-MCNC: 12.2 G/DL — SIGNIFICANT CHANGE UP (ref 12–16)
MAGNESIUM SERPL-MCNC: 2.5 MG/DL — SIGNIFICANT CHANGE UP (ref 1.6–2.6)
MCHC RBC-ENTMCNC: 28.3 PG — SIGNIFICANT CHANGE UP (ref 27–31)
MCHC RBC-ENTMCNC: 33.1 G/DL — SIGNIFICANT CHANGE UP (ref 32–36)
MCV RBC AUTO: 85.6 FL — SIGNIFICANT CHANGE UP (ref 81–99)
PHOSPHATE SERPL-MCNC: 3.4 MG/DL — SIGNIFICANT CHANGE UP (ref 2.4–4.7)
PLATELET # BLD AUTO: 235 K/UL — SIGNIFICANT CHANGE UP (ref 150–400)
POTASSIUM SERPL-MCNC: 3.8 MMOL/L — SIGNIFICANT CHANGE UP (ref 3.5–5.3)
POTASSIUM SERPL-SCNC: 3.8 MMOL/L — SIGNIFICANT CHANGE UP (ref 3.5–5.3)
RBC # BLD: 4.31 M/UL — LOW (ref 4.4–5.2)
RBC # FLD: 15.2 % — SIGNIFICANT CHANGE UP (ref 11–15.6)
SODIUM SERPL-SCNC: 149 MMOL/L — HIGH (ref 135–145)
WBC # BLD: 10.4 K/UL — SIGNIFICANT CHANGE UP (ref 4.8–10.8)
WBC # FLD AUTO: 10.4 K/UL — SIGNIFICANT CHANGE UP (ref 4.8–10.8)

## 2018-10-25 PROCEDURE — 99233 SBSQ HOSP IP/OBS HIGH 50: CPT

## 2018-10-25 PROCEDURE — 99232 SBSQ HOSP IP/OBS MODERATE 35: CPT

## 2018-10-25 RX ORDER — METOPROLOL TARTRATE 50 MG
5 TABLET ORAL ONCE
Qty: 0 | Refills: 0 | Status: COMPLETED | OUTPATIENT
Start: 2018-10-25 | End: 2018-10-25

## 2018-10-25 RX ORDER — AMIODARONE HYDROCHLORIDE 400 MG/1
150 TABLET ORAL ONCE
Qty: 0 | Refills: 0 | Status: COMPLETED | OUTPATIENT
Start: 2018-10-25 | End: 2018-10-25

## 2018-10-25 RX ADMIN — Medication 5 MILLIGRAM(S): at 20:54

## 2018-10-25 RX ADMIN — Medication 650 MILLIGRAM(S): at 19:00

## 2018-10-25 RX ADMIN — Medication 5 MILLIGRAM(S): at 02:55

## 2018-10-25 RX ADMIN — CHLORHEXIDINE GLUCONATE 15 MILLILITER(S): 213 SOLUTION TOPICAL at 05:28

## 2018-10-25 RX ADMIN — CEFTRIAXONE 100 GRAM(S): 500 INJECTION, POWDER, FOR SOLUTION INTRAMUSCULAR; INTRAVENOUS at 09:51

## 2018-10-25 RX ADMIN — AMIODARONE HYDROCHLORIDE 618 MILLIGRAM(S): 400 TABLET ORAL at 21:30

## 2018-10-25 RX ADMIN — PANTOPRAZOLE SODIUM 40 MILLIGRAM(S): 20 TABLET, DELAYED RELEASE ORAL at 12:09

## 2018-10-25 RX ADMIN — Medication 10 MILLIGRAM(S): at 01:06

## 2018-10-25 RX ADMIN — CHLORHEXIDINE GLUCONATE 15 MILLILITER(S): 213 SOLUTION TOPICAL at 17:46

## 2018-10-25 RX ADMIN — AMLODIPINE BESYLATE 5 MILLIGRAM(S): 2.5 TABLET ORAL at 05:28

## 2018-10-25 RX ADMIN — Medication 10 MILLIGRAM(S): at 07:36

## 2018-10-25 RX ADMIN — Medication 10 MILLIGRAM(S): at 16:22

## 2018-10-25 RX ADMIN — Medication 650 MILLIGRAM(S): at 17:46

## 2018-10-25 RX ADMIN — Medication 5 MILLIGRAM(S): at 20:44

## 2018-10-25 NOTE — PROGRESS NOTE ADULT - ASSESSMENT
89 female with hx of dementia, HTN, admitted with massive intracerebral hemorrhage, encephalopathy, acute respiratory failure, now with poor mental status/ neuro exam.  Was on nicardipine for hypertensive emergency, now on oral agents.  Awaiting family decision on withdrawal of life support.

## 2018-10-25 NOTE — PROGRESS NOTE ADULT - SUBJECTIVE AND OBJECTIVE BOX
CC: follow  up GOC  INTERVAL HPI/OVERNIGHT EVENTS:  no family present  PRESENT SYMPTOMS: SOURCE:  Patient/Family/Team    PAIN SCALE:  0 = none  1 = mild   2 = moderate  3 = severe    Pain: no sign    Dyspnea:  [ ] YES [ ] NO  on vent  Anxiety:  [ ] YES [ ] NO  na  non responsive  Fatigue: [ ] YES [ ] NO  na  Nausea: [ ] YES [ ] NO na   Loss of Appetite: [x ] YES [ ] NO  Other symptoms: __________    MEDICATIONS  (STANDING):  amLODIPine   Tablet 5 milliGRAM(s) Oral daily  cefTRIAXone   IVPB      cefTRIAXone   IVPB 1 Gram(s) IV Intermittent every 24 hours  chlorhexidine 0.12% Liquid 15 milliLiter(s) Oral Mucosa two times a day  pantoprazole  Injectable 40 milliGRAM(s) IV Push daily    MEDICATIONS  (PRN):  acetaminophen    Suspension .. 650 milliGRAM(s) Oral every 6 hours PRN Temp greater or equal to 38C (100.4F)  fentaNYL    Injectable 50 MICROGram(s) IV Push every 2 hours PRN pain / agitation in intubated patient  hydrALAZINE Injectable 10 milliGRAM(s) IV Push every 4 hours PRN SBP > 170      Allergies    No Known Drug Allergies  Shellfish (Rash)    Intolerances    Karnofsky Performance Score/Palliative Performance Status Version 2:  10    %    Vital Signs Last 24 Hrs  T(C): 38 (25 Oct 2018 12:00), Max: 38.1 (24 Oct 2018 21:23)  T(F): 100.4 (25 Oct 2018 12:00), Max: 100.5 (24 Oct 2018 21:23)  HR: 87 (25 Oct 2018 14:00) (69 - 120)  BP: 164/70 (25 Oct 2018 14:00) (123/69 - 188/77)  BP(mean): 100 (25 Oct 2018 14:00) (84 - 110)  RR: 15 (25 Oct 2018 14:00) (14 - 59)  SpO2: 100% (25 Oct 2018 14:00) (99% - 100%)    PHYSICAL EXAM:    General: non responsive to verbal/tactile stimuli    HEENT:  NCAT  [ x] ET tube/trach    Lungs: [ x] comfortable on vent    CV: [x ] normal  [ ] tachycardia    GI: soft NTND    : [ ] normal  [x ] incontinent  [ ] oliguria/anuria  [ ] garcia    MSK: [ ] normal  [x ] weakness  [ ] edema             [ ] ambulatory  [ ] bedbound/wheelchair bound    Skin: [ ] normal  [ ] pressure ulcers- Stage_____  [x ] no rash    LABS:                        12.2   10.4  )-----------( 235      ( 25 Oct 2018 08:11 )             36.9     10-25    149<H>  |  109<H>  |  53.0<H>  ----------------------------<  113  3.8   |  23.0  |  1.13    Ca    9.7      25 Oct 2018 08:11  Phos  3.4     10-25  Mg     2.5     10-25    TPro  6.7  /  Alb  3.5  /  TBili  0.6  /  DBili  x   /  AST  30  /  ALT  12  /  AlkPhos  100  10-24        I&O's Summary    24 Oct 2018 07:01  -  25 Oct 2018 07:00  --------------------------------------------------------  IN: 50 mL / OUT: 400 mL / NET: -350 mL    25 Oct 2018 07:01  -  25 Oct 2018 14:21  --------------------------------------------------------  IN: 50 mL / OUT: 0 mL / NET: 50 mL        RADIOLOGY & ADDITIONAL STUDIES:    < from: EEG Awake and Asleep (10.23.18 @ 22:07) >      IMPRESSION: Is a markedly abnormal EEG recording. It demonstrates a burst   suppression pattern which is indicative of a severe diffuse cerebral   disturbance. On occasion some of this burst activity appears somewhat   epileptiform although there are no definite active seizures recorded.       IMPRESSION:      Read By: TAI VILLAGOMEZ M.D., NEUROLOGY ATTENDING  Oct 24 2018  9:20AM.     Signed By: TAI VILLAGOMEZ M.D., NEUROLOGY ATTENDING  Oct 24 2018  9:23AM  This report has been electronically signed.      < end of copied text >    Thank you for the opportunity to assist with the care of this patient.   [facility] Consult [facility] [phone].

## 2018-10-25 NOTE — PROGRESS NOTE ADULT - SUBJECTIVE AND OBJECTIVE BOX
INTERVAL HPI/OVERNIGHT EVENTS: No decision yet from family regarding withdrawal of care     Antimicrobial:  cefTRIAXone   IVPB      cefTRIAXone   IVPB 1 Gram(s) IV Intermittent every 24 hours    Cardiovascular:  amLODIPine   Tablet 5 milliGRAM(s) Oral daily  hydrALAZINE Injectable 10 milliGRAM(s) IV Push every 4 hours PRN    Pulmonary:    Hematalogic:    Other:  acetaminophen    Suspension .. 650 milliGRAM(s) Oral every 6 hours PRN  chlorhexidine 0.12% Liquid 15 milliLiter(s) Oral Mucosa two times a day  fentaNYL    Injectable 50 MICROGram(s) IV Push every 2 hours PRN  pantoprazole  Injectable 40 milliGRAM(s) IV Push daily      Drug Dosing Weight  Height (cm): 149.86 (21 Oct 2018 16:25)  Weight (kg): 43.9 (21 Oct 2018 16:25)  BMI (kg/m2): 19.5 (21 Oct 2018 16:25)  BSA (m2): 1.35 (21 Oct 2018 16:25)      PMH/Social Hx/Fam Hx -reviewed admission note, no change since admission  PAST MEDICAL & SURGICAL HISTORY:  Oral phase dysphagia  Fall, initial encounter  SDH (subdural hematoma)  CVA (cerebral vascular accident)  Diabetes  Atrial fibrillation  High cholesterol  Hypertension  Delivered by  section  S/P tonsillectomy      T(C): 38.1 (10-25-18 @ 16:00), Max: 38.1 (10-24-18 @ 21:23)  HR: 93 (10-25-18 @ 19:00)  BP: 145/64 (10-25-18 @ 19:00)  BP(mean): 92 (10-25-18 @ 19:00)  ABP: --  ABP(mean): --  RR: 14 (10-25-18 @ 19:00)  SpO2: 100% (10-25-18 @ 19:00)  Wt(kg): --          10-24 @ 07:01  -  10-25 @ 07:00  --------------------------------------------------------  IN: 50 mL / OUT: 400 mL / NET: -350 mL        Mode: AC/ CMV (Assist Control/ Continuous Mandatory Ventilation)  RR (machine): 14  TV (machine): 360  FiO2: 30  PEEP: 5  MAP: 8  PIP: 25      PHYSICAL EXAM:    Intubated poorly responsive, triggering vent  equal air entry  abd soft  trace edema      LABS:  CBC Full  -  ( 25 Oct 2018 08:11 )  WBC Count : 10.4 K/uL  Hemoglobin : 12.2 g/dL  Hematocrit : 36.9 %  Platelet Count - Automated : 235 K/uL  Mean Cell Volume : 85.6 fl  Mean Cell Hemoglobin : 28.3 pg  Mean Cell Hemoglobin Concentration : 33.1 g/dL  Auto Neutrophil # : x  Auto Lymphocyte # : x  Auto Monocyte # : x  Auto Eosinophil # : x  Auto Basophil # : x  Auto Neutrophil % : x  Auto Lymphocyte % : x  Auto Monocyte % : x  Auto Eosinophil % : x  Auto Basophil % : x    10-    149<H>  |  109<H>  |  53.0<H>  ----------------------------<  113  3.8   |  23.0  |  1.13    Ca    9.7      25 Oct 2018 08:11  Phos  3.4     10-  Mg     2.5     10-    TPro  6.7  /  Alb  3.5  /  TBili  0.6  /  DBili  x   /  AST  30  /  ALT  12  /  AlkPhos  100  10-            RADIOLOGY & ADDITIONAL STUDIES REVIEWED - CT brain shows large intraparenchymal hemorrhage with intraventricular extension.

## 2018-10-25 NOTE — PROGRESS NOTE ADULT - PROBLEM SELECTOR PLAN 3
No family at bedside. Son has not reached back to any members of the PC team.  Gave my number to MICU for me to be called if any family arrives.  Nurse reports from night shift team that family, especially son overwhelmed with current situation given recent death of son's wife.   PC team will continue to follow and provide support.

## 2018-10-26 DIAGNOSIS — N39.0 URINARY TRACT INFECTION, SITE NOT SPECIFIED: ICD-10-CM

## 2018-10-26 DIAGNOSIS — I61.9 NONTRAUMATIC INTRACEREBRAL HEMORRHAGE, UNSPECIFIED: ICD-10-CM

## 2018-10-26 LAB
ANION GAP SERPL CALC-SCNC: 17 MMOL/L — SIGNIFICANT CHANGE UP (ref 5–17)
BUN SERPL-MCNC: 68 MG/DL — HIGH (ref 8–20)
CALCIUM SERPL-MCNC: 9.6 MG/DL — SIGNIFICANT CHANGE UP (ref 8.6–10.2)
CHLORIDE SERPL-SCNC: 107 MMOL/L — SIGNIFICANT CHANGE UP (ref 98–107)
CO2 SERPL-SCNC: 22 MMOL/L — SIGNIFICANT CHANGE UP (ref 22–29)
CREAT SERPL-MCNC: 1.25 MG/DL — SIGNIFICANT CHANGE UP (ref 0.5–1.3)
GLUCOSE SERPL-MCNC: 138 MG/DL — HIGH (ref 70–115)
HCT VFR BLD CALC: 36.2 % — LOW (ref 37–47)
HGB BLD-MCNC: 12.1 G/DL — SIGNIFICANT CHANGE UP (ref 12–16)
MAGNESIUM SERPL-MCNC: 2.6 MG/DL — SIGNIFICANT CHANGE UP (ref 1.6–2.6)
MCHC RBC-ENTMCNC: 28.4 PG — SIGNIFICANT CHANGE UP (ref 27–31)
MCHC RBC-ENTMCNC: 33.4 G/DL — SIGNIFICANT CHANGE UP (ref 32–36)
MCV RBC AUTO: 85 FL — SIGNIFICANT CHANGE UP (ref 81–99)
PHOSPHATE SERPL-MCNC: 3.4 MG/DL — SIGNIFICANT CHANGE UP (ref 2.4–4.7)
PLATELET # BLD AUTO: 256 K/UL — SIGNIFICANT CHANGE UP (ref 150–400)
POTASSIUM SERPL-MCNC: 3.4 MMOL/L — LOW (ref 3.5–5.3)
POTASSIUM SERPL-SCNC: 3.4 MMOL/L — LOW (ref 3.5–5.3)
RBC # BLD: 4.26 M/UL — LOW (ref 4.4–5.2)
RBC # FLD: 15 % — SIGNIFICANT CHANGE UP (ref 11–15.6)
SODIUM SERPL-SCNC: 146 MMOL/L — HIGH (ref 135–145)
WBC # BLD: 7.8 K/UL — SIGNIFICANT CHANGE UP (ref 4.8–10.8)
WBC # FLD AUTO: 7.8 K/UL — SIGNIFICANT CHANGE UP (ref 4.8–10.8)

## 2018-10-26 PROCEDURE — 99233 SBSQ HOSP IP/OBS HIGH 50: CPT

## 2018-10-26 RX ORDER — POTASSIUM CHLORIDE 20 MEQ
10 PACKET (EA) ORAL
Qty: 0 | Refills: 0 | Status: COMPLETED | OUTPATIENT
Start: 2018-10-26 | End: 2018-10-26

## 2018-10-26 RX ADMIN — Medication 100 MILLIEQUIVALENT(S): at 14:39

## 2018-10-26 RX ADMIN — PANTOPRAZOLE SODIUM 40 MILLIGRAM(S): 20 TABLET, DELAYED RELEASE ORAL at 11:13

## 2018-10-26 RX ADMIN — Medication 10 MILLIGRAM(S): at 22:58

## 2018-10-26 RX ADMIN — Medication 100 MILLIEQUIVALENT(S): at 08:59

## 2018-10-26 RX ADMIN — CHLORHEXIDINE GLUCONATE 15 MILLILITER(S): 213 SOLUTION TOPICAL at 17:51

## 2018-10-26 RX ADMIN — Medication 10 MILLIGRAM(S): at 00:14

## 2018-10-26 RX ADMIN — CHLORHEXIDINE GLUCONATE 15 MILLILITER(S): 213 SOLUTION TOPICAL at 06:07

## 2018-10-26 RX ADMIN — Medication 100 MILLIEQUIVALENT(S): at 11:13

## 2018-10-26 RX ADMIN — Medication 10 MILLIGRAM(S): at 07:34

## 2018-10-26 RX ADMIN — Medication 650 MILLIGRAM(S): at 17:59

## 2018-10-26 RX ADMIN — AMLODIPINE BESYLATE 5 MILLIGRAM(S): 2.5 TABLET ORAL at 06:07

## 2018-10-26 RX ADMIN — CEFTRIAXONE 100 GRAM(S): 500 INJECTION, POWDER, FOR SOLUTION INTRAMUSCULAR; INTRAVENOUS at 08:59

## 2018-10-26 NOTE — PROGRESS NOTE ADULT - ASSESSMENT
89 female with hx of dementia, HTN, admitted with massive intracerebral hemorrhage, encephalopathy, acute respiratory failure, now with poor mental status/ neuro exam.  Was on nicardipine for hypertensive emergency, now on oral agents. Doing well on spontaneous breathing trials, however with poor mental status.   When son arrives we will attempt extubation, if she fails then re-intubation and plan for trach/peg next week.  Son expressed willingness to subject his mother to tracheostomy.

## 2018-10-26 NOTE — CHART NOTE - NSCHARTNOTEFT_GEN_A_CORE
Upon Nutritional Assessment by the Registered Dietitian your patient was determined to meet criteria / has evidence of the following diagnosis/diagnoses:          [ ]  Mild Protein Calorie Malnutrition        [ ]  Moderate Protein Calorie Malnutrition        [x] Severe Protein Calorie Malnutrition        [ ] Unspecified Protein Calorie Malnutrition        [ ] Underweight / BMI <19        [ ] Morbid Obesity / BMI > 40    Pt now presents with malnutrition (severe, acute) related to inability to receive sufficient protein-energy associated with increased nutrient needs 2/2 suspected DTI to right buttock and stage II pressure injury to left ear, massive intraparenchymal hemorrhage and acute respiratory failure requiring intubation, and poor prognosis as evidenced by <50% energy intake >/5 days, significant wt loss of 10.4% x 5 days, and moderate muscle loss of temples.     Findings as based on:  •  Comprehensive nutrition assessment and consultation  •  Calorie counts (nutrient intake analysis)  •  Food acceptance and intake status from observations by staff  •  Follow up  •  Patient education  •  Intervention secondary to interdisciplinary rounds  •   concerns      Treatment:    The following diet has been recommended: Continue tube feeds as ordered. Rx MVI and vitamin C 500mg daily to aid in wound healing      PROVIDER Section:     By signing this assessment you are acknowledging and agree with the diagnosis/diagnoses assigned by the Registered Dietitian    Comments:

## 2018-10-26 NOTE — PROGRESS NOTE ADULT - ATTENDING COMMENTS
total cc time 35 min spent assessing neurologic status, titrating vent settings, attempting to ready the patient for extubation.

## 2018-10-26 NOTE — PROGRESS NOTE ADULT - SUBJECTIVE AND OBJECTIVE BOX
INTERVAL HPI/OVERNIGHT EVENTS:   Doing well on SBT, still with poor mental status.    Antimicrobial:  cefTRIAXone   IVPB      cefTRIAXone   IVPB 1 Gram(s) IV Intermittent every 24 hours    Cardiovascular:  amLODIPine   Tablet 5 milliGRAM(s) Oral daily  hydrALAZINE Injectable 10 milliGRAM(s) IV Push every 4 hours PRN    Pulmonary:    Hematalogic:    Other:  acetaminophen    Suspension .. 650 milliGRAM(s) Oral every 6 hours PRN  chlorhexidine 0.12% Liquid 15 milliLiter(s) Oral Mucosa two times a day  fentaNYL    Injectable 50 MICROGram(s) IV Push every 2 hours PRN  pantoprazole  Injectable 40 milliGRAM(s) IV Push daily      Drug Dosing Weight  Height (cm): 149.86 (21 Oct 2018 16:25)  Weight (kg): 43.9 (21 Oct 2018 16:25)  BMI (kg/m2): 19.5 (21 Oct 2018 16:25)  BSA (m2): 1.35 (21 Oct 2018 16:25)    CENTRAL LINE: [ ] YES [x ] NO  LOCATION:   DATE INSERTED:    DEGROOT: [ ] YES [ x] NO    DATE INSERTED:    A-LINE:  [ ] YES [x ] NO  LOCATION:   DATE INSERTED:    PMH/Social Hx/Fam Hx -reviewed admission note, no change since admission  PAST MEDICAL & SURGICAL HISTORY:  Oral phase dysphagia  Fall, initial encounter  SDH (subdural hematoma)  CVA (cerebral vascular accident)  Diabetes  Atrial fibrillation  High cholesterol  Hypertension  Delivered by  section  S/P tonsillectomy      T(C): 37.7 (10-26-18 @ 08:00), Max: 38.1 (10-25-18 @ 16:00)  HR: 91 (10-26-18 @ 14:00)  BP: 144/65 (10-26-18 @ 14:00)  BP(mean): 93 (10-26-18 @ 14:00)  ABP: --  ABP(mean): --  RR: 23 (10-26-18 @ 14:00)  SpO2: 100% (10-26-18 @ 14:00)  Wt(kg): --          10-25 @ 07:01  -  10-26 @ 07:00  --------------------------------------------------------  IN: 200 mL / OUT: 200 mL / NET: 0 mL        Mode: CPAP with PS  FiO2: 30  PEEP: 5  PS: 5  MAP: 7      PHYSICAL EXAM:    GENERAL: [x ]NAD, [ ]well-groomed, [ ]well-developed;  [ ]  HEAD:  [ x]Atraumatic, [ x]Normocephalic;  [ ]  EYES: [ ]EOMI, [ ]PERRLA, [ x]conjunctiva and sclera clear;   [ ]  ENMT: [x ]No tonsillar erythema, exudates, or enlargement; [ x]Moist mucous membranes, [ ]Good dentition, [ ]No lesions; [ ]  NECK: [ x]Supple, normal appearance, [ ]No JVD; [ ]Normal thyroid; [ ]Trachea midline; [ ]  NERVOUS SYSTEM:  [ ]Alert & Oriented X3, [ ]Good concentration; [ ]Motor Strength 5/5 B/L upper and lower extremities; [ ]DTRs 2+ intact and symmetric; [x ] unresponsive, +cough and gag, unable to assess pupillary response,   CHEST/LUNG: [x ]No chest deformity; [ ]Normal percussion bilaterally; [ ]No rales, rhonchi, wheezing; [ ]Crackles at bases; [ ]  HEART: [ ]Regular rate and rhythm; [ ]No murmurs, rubs, or gallops;  [ ]  ABDOMEN: [x ]Soft, Nontender, Nondistended; [ ]Bowel sounds present;  [ ]  EXTREMITIES:  [x ]2+ Peripheral Pulses, [ ]No clubbing, cyanosis, or edema [ ]Bilat lower extremity edema;  [ ]  LYMPH: [x ]No lymphadenopathy noted;  [ ]  SKIN: [x ]No rashes or lesions; [ ]Good capillary refill;  [ ]      LABS:  CBC Full  -  ( 26 Oct 2018 06:00 )  WBC Count : 7.8 K/uL  Hemoglobin : 12.1 g/dL  Hematocrit : 36.2 %  Platelet Count - Automated : 256 K/uL  Mean Cell Volume : 85.0 fl  Mean Cell Hemoglobin : 28.4 pg  Mean Cell Hemoglobin Concentration : 33.4 g/dL  Auto Neutrophil # : x  Auto Lymphocyte # : x  Auto Monocyte # : x  Auto Eosinophil # : x  Auto Basophil # : x  Auto Neutrophil % : x  Auto Lymphocyte % : x  Auto Monocyte % : x  Auto Eosinophil % : x  Auto Basophil % : x    10-    146<H>  |  107  |  68.0<H>  ----------------------------<  138<H>  3.4<L>   |  22.0  |  1.25    Ca    9.6      26 Oct 2018 06:00  Phos  3.4     10-  Mg     2.6     10-                [x ]GOALS OF CARE DISCUSSION WITH PATIENT/FAMILY/PROXY- spoke with son Jack and informed him that patient was doing well on SBT and that we would attempt a trial of extubation today, with the understanding that if she failed and required re-intubation, then she would most likely require trach/peg, long term mechanical vent.    CRITICAL CARE TIME SPENT: 35 minutes

## 2018-10-26 NOTE — CHART NOTE - NSCHARTNOTEFT_GEN_A_CORE
Source: Patient [ ]  Family [ ]   other [x] EMR, discussed on rounds    Current Diet: Diet, NPO with Tube Feed:   Tube Feeding Modality: Orogastric  Glucerna 1.5 Bob  Total Volume for 24 Hours (mL): 1200  Continuous  Starting Tube Feed Rate {mL per Hour}: 50  Until Goal Tube Feed Rate (mL per Hour): 50  Tube Feed Duration (in Hours): 24  Tube Feed Start Time: 11:00 (10-26-18 @ 10:54)    Enteral /Parenteral Nutrition: Pt has been NPO since admission. Tube feeds ordered today per family wishes, currently running at goal of 50 ml/hr (so far 200 ml provided).   At goal rate of 50 ml/hr (x20 hrs) will provide 1000 ml, 1500 kcal, 82.5g protein, 759 ml free water, and 100% of RDIs for vitamins/minerals.    Current Weight:   (10/26) 92.5 lbs  (10/25) 94.7 lbs  (10/24) 97 lbs  (10/23) 104.4 lbs  (10/22) 103.3 lbs    % Weight Change:  10.8# wt loss x 5 days (10.4%)     Pertinent Medications: MEDICATIONS  (STANDING):  amLODIPine   Tablet 5 milliGRAM(s) Oral daily  cefTRIAXone   IVPB      cefTRIAXone   IVPB 1 Gram(s) IV Intermittent every 24 hours  chlorhexidine 0.12% Liquid 15 milliLiter(s) Oral Mucosa two times a day  pantoprazole  Injectable 40 milliGRAM(s) IV Push daily  potassium chloride  10 mEq/100 mL IVPB 10 milliEquivalent(s) IV Intermittent every 1 hour    MEDICATIONS  (PRN):  acetaminophen    Suspension .. 650 milliGRAM(s) Oral every 6 hours PRN Temp greater or equal to 38C (100.4F)  fentaNYL    Injectable 50 MICROGram(s) IV Push every 2 hours PRN pain / agitation in intubated patient  hydrALAZINE Injectable 10 milliGRAM(s) IV Push every 4 hours PRN SBP > 170    Pertinent Labs: CBC Full  -  ( 26 Oct 2018 06:00 )  WBC Count : 7.8 K/uL  Hemoglobin : 12.1 g/dL  Hematocrit : 36.2 %  Platelet Count - Automated : 256 K/uL  Mean Cell Volume : 85.0 fl  Mean Cell Hemoglobin : 28.4 pg  Mean Cell Hemoglobin Concentration : 33.4 g/dL  Auto Neutrophil # : x  Auto Lymphocyte # : x  Auto Monocyte # : x  Auto Eosinophil # : x  Auto Basophil # : x  Auto Neutrophil % : x  Auto Lymphocyte % : x  Auto Monocyte % : x  Auto Eosinophil % : x  Auto Basophil % : x    10-26 Na146 mmol/L<H> Glu 138 mg/dL<H> K+ 3.4 mmol/L<L> Cr  1.25 mg/dL BUN 68.0 mg/dL<H> Phos 3.4 mg/dL Alb n/a   PAB n/a       Skin: suspected DTI right buttock and stage II pressure injury to left ear per documentation    Nutrition focused physical exam conducted - found signs of malnutrition [ ]absent [x]present    Subcutaneous fat loss: [ ] Orbital fat pads region, [ ]Buccal fat region, [ ]Triceps region,  [ ]Ribs region    Muscle wasting: [x]Temples region, [ ]Clavicle region, [ ]Shoulder region, [ ]Scapula region, [ ]Interosseous region,  [ ]thigh region, [ ]Calf region    Estimated Needs:   [x] no change since previous assessment  [ ] recalculated:     Current Nutrition Diagnosis: Pt now presents with malnutrition (severe, acute) related to inability to receive sufficient protein-energy associated with increased nutrient needs 2/2 suspected DTI to right buttock and stage II pressure injury to left ear, massive intraparenchymal hemorrhage and acute respiratory failure requiring intubation, and poor prognosis as evidenced by <50% energy intake >/5 days, significant wt loss of 10.4% x 5 days, and moderate muscle loss of temples.     Recommendations:   1) Continue tube feeds as ordered  2) Recommend MVI and vitamin C 500mg daily to aid in wound healing    Monitoring and Evaluation:   [ ] PO intake [x] Tolerance to diet prescription [X] Weights  [X] Follow up per protocol [X] Labs

## 2018-10-27 LAB
ANION GAP SERPL CALC-SCNC: 12 MMOL/L — SIGNIFICANT CHANGE UP (ref 5–17)
BASOPHILS # BLD AUTO: 0 K/UL — SIGNIFICANT CHANGE UP (ref 0–0.2)
BASOPHILS NFR BLD AUTO: 0.3 % — SIGNIFICANT CHANGE UP (ref 0–2)
BUN SERPL-MCNC: 86 MG/DL — HIGH (ref 8–20)
C DIFF BY PCR RESULT: SIGNIFICANT CHANGE UP
C DIFF TOX GENS STL QL NAA+PROBE: SIGNIFICANT CHANGE UP
CALCIUM SERPL-MCNC: 9.6 MG/DL — SIGNIFICANT CHANGE UP (ref 8.6–10.2)
CHLORIDE SERPL-SCNC: 113 MMOL/L — HIGH (ref 98–107)
CO2 SERPL-SCNC: 22 MMOL/L — SIGNIFICANT CHANGE UP (ref 22–29)
CREAT SERPL-MCNC: 1.5 MG/DL — HIGH (ref 0.5–1.3)
EOSINOPHIL # BLD AUTO: 0 K/UL — SIGNIFICANT CHANGE UP (ref 0–0.5)
EOSINOPHIL NFR BLD AUTO: 0.4 % — SIGNIFICANT CHANGE UP (ref 0–6)
GLUCOSE SERPL-MCNC: 181 MG/DL — HIGH (ref 70–115)
HCT VFR BLD CALC: 40.1 % — SIGNIFICANT CHANGE UP (ref 37–47)
HGB BLD-MCNC: 13.1 G/DL — SIGNIFICANT CHANGE UP (ref 12–16)
LYMPHOCYTES # BLD AUTO: 1 K/UL — SIGNIFICANT CHANGE UP (ref 1–4.8)
LYMPHOCYTES # BLD AUTO: 10 % — LOW (ref 20–55)
MAGNESIUM SERPL-MCNC: 2.9 MG/DL — HIGH (ref 1.6–2.6)
MCHC RBC-ENTMCNC: 27.9 PG — SIGNIFICANT CHANGE UP (ref 27–31)
MCHC RBC-ENTMCNC: 32.7 G/DL — SIGNIFICANT CHANGE UP (ref 32–36)
MCV RBC AUTO: 85.3 FL — SIGNIFICANT CHANGE UP (ref 81–99)
MONOCYTES # BLD AUTO: 0.9 K/UL — HIGH (ref 0–0.8)
MONOCYTES NFR BLD AUTO: 9.1 % — SIGNIFICANT CHANGE UP (ref 3–10)
NEUTROPHILS # BLD AUTO: 8 K/UL — SIGNIFICANT CHANGE UP (ref 1.8–8)
NEUTROPHILS NFR BLD AUTO: 77.8 % — HIGH (ref 37–73)
PHOSPHATE SERPL-MCNC: 2.4 MG/DL — SIGNIFICANT CHANGE UP (ref 2.4–4.7)
PLATELET # BLD AUTO: 124 K/UL — LOW (ref 150–400)
POTASSIUM SERPL-MCNC: 5.2 MMOL/L — SIGNIFICANT CHANGE UP (ref 3.5–5.3)
POTASSIUM SERPL-SCNC: 5.2 MMOL/L — SIGNIFICANT CHANGE UP (ref 3.5–5.3)
RBC # BLD: 4.7 M/UL — SIGNIFICANT CHANGE UP (ref 4.4–5.2)
RBC # FLD: 15.3 % — SIGNIFICANT CHANGE UP (ref 11–15.6)
SODIUM SERPL-SCNC: 147 MMOL/L — HIGH (ref 135–145)
WBC # BLD: 10.2 K/UL — SIGNIFICANT CHANGE UP (ref 4.8–10.8)
WBC # FLD AUTO: 10.2 K/UL — SIGNIFICANT CHANGE UP (ref 4.8–10.8)

## 2018-10-27 PROCEDURE — 99291 CRITICAL CARE FIRST HOUR: CPT

## 2018-10-27 RX ORDER — SODIUM CHLORIDE 9 MG/ML
1000 INJECTION, SOLUTION INTRAVENOUS
Qty: 0 | Refills: 0 | Status: COMPLETED | OUTPATIENT
Start: 2018-10-27 | End: 2018-10-27

## 2018-10-27 RX ORDER — PANTOPRAZOLE SODIUM 20 MG/1
40 TABLET, DELAYED RELEASE ORAL DAILY
Qty: 0 | Refills: 0 | Status: DISCONTINUED | OUTPATIENT
Start: 2018-10-27 | End: 2018-11-02

## 2018-10-27 RX ORDER — SODIUM CHLORIDE 9 MG/ML
1000 INJECTION, SOLUTION INTRAVENOUS
Qty: 0 | Refills: 0 | Status: DISCONTINUED | OUTPATIENT
Start: 2018-10-27 | End: 2018-10-27

## 2018-10-27 RX ADMIN — Medication 10 MILLIGRAM(S): at 08:23

## 2018-10-27 RX ADMIN — Medication 650 MILLIGRAM(S): at 17:33

## 2018-10-27 RX ADMIN — CHLORHEXIDINE GLUCONATE 15 MILLILITER(S): 213 SOLUTION TOPICAL at 05:43

## 2018-10-27 RX ADMIN — Medication 650 MILLIGRAM(S): at 02:30

## 2018-10-27 RX ADMIN — Medication 650 MILLIGRAM(S): at 02:14

## 2018-10-27 RX ADMIN — PANTOPRAZOLE SODIUM 40 MILLIGRAM(S): 20 TABLET, DELAYED RELEASE ORAL at 11:12

## 2018-10-27 RX ADMIN — Medication 10 MILLIGRAM(S): at 22:09

## 2018-10-27 RX ADMIN — AMLODIPINE BESYLATE 5 MILLIGRAM(S): 2.5 TABLET ORAL at 05:43

## 2018-10-27 RX ADMIN — CHLORHEXIDINE GLUCONATE 15 MILLILITER(S): 213 SOLUTION TOPICAL at 17:34

## 2018-10-27 RX ADMIN — SODIUM CHLORIDE 100 MILLILITER(S): 9 INJECTION, SOLUTION INTRAVENOUS at 17:00

## 2018-10-27 RX ADMIN — Medication 650 MILLIGRAM(S): at 18:36

## 2018-10-27 NOTE — PROVIDER CONTACT NOTE (EICU) - BACKGROUND
Provider performing other tasks, asked for assistance with orders
No improvement with cardizem 10mg IVP
cva

## 2018-10-27 NOTE — PROGRESS NOTE ADULT - ASSESSMENT
89F with a large non-traumatic, spontaneous hemorrhagic CVA, with intraparenchymal and intraventricular hemorrhage. With uncontrolled HTN and respiratory failure.    1: Non-traumatic hemorrhagic CVA (IPH/IVH) complicated by ac encephalopathy  2: Acute hypoxic resp failure  3: A fib  4: DM-2      Neuro:   Remains comatose (BS reflexes +) off of sedative for > 48 hours  EEG:  burst-suppression no NCSE as per neuro  No neurosx intervention  S/p nicardipine drip  Poor prognosis confirmed by neurology  Palliative care involved and input appreciated: currently DNR  I called and talked to patient's son Jack who said that his sister Kriss who is RN and coming over to hospital later this evening and he is agreeable to her plan about further goals of care    Cardiovascular:   HD stable for now  Will add norvac  Adding IVf and Free water    Resp/Chest:   Remains on full volume AC mode, failed PSV/ SBT in AM  VAP ppx bundle    Gi/Nutrition:   Started Feeding  Now has diarrhea since this morning, water, needs rectal tube for now  Will r/o CDAD  PPI    ID:   5 day course of IV CTX through this hospital course    Nephro/Electrolyte/Acid-Base:   Stable    Endocrinology:   Stable    Haem/Oncology:   SCD

## 2018-10-27 NOTE — PROGRESS NOTE ADULT - SUBJECTIVE AND OBJECTIVE BOX
Patient is a 89y old  Female who presents with a chief complaint of ICH (26 Oct 2018 14:43)      BRIEF HOSPITAL COURSE: ***    Events last 24 hours: ***    PAST MEDICAL & SURGICAL HISTORY:  Oral phase dysphagia  Fall, initial encounter  SDH (subdural hematoma)  CVA (cerebral vascular accident)  Diabetes  Atrial fibrillation  High cholesterol  Hypertension  Delivered by  section  S/P tonsillectomy      Review of Systems:  Could not obtain ROS due to underlying mentation    Medications:    amLODIPine   Tablet 5 milliGRAM(s) Oral daily  hydrALAZINE Injectable 10 milliGRAM(s) IV Push every 4 hours PRN      acetaminophen    Suspension .. 650 milliGRAM(s) Oral every 6 hours PRN  fentaNYL    Injectable 50 MICROGram(s) IV Push every 2 hours PRN        pantoprazole   Suspension 40 milliGRAM(s) Oral daily            chlorhexidine 0.12% Liquid 15 milliLiter(s) Oral Mucosa two times a day        Mode: CPAP with PS  FiO2: 30  PEEP: 5  PS: 5  MAP: 8      ICU Vital Signs Last 24 Hrs  T(C): 37 (27 Oct 2018 08:00), Max: 38.5 (26 Oct 2018 18:00)  T(F): 98.6 (27 Oct 2018 08:00), Max: 101.3 (26 Oct 2018 18:00)  HR: 101 (27 Oct 2018 13:00) (87 - 108)  BP: 156/67 (27 Oct 2018 13:00) (117/60 - 187/80)  BP(mean): 97 (27 Oct 2018 13:00) (82 - 115)  ABP: --  ABP(mean): --  RR: 37 (27 Oct 2018 13:00) (22 - 53)  SpO2: 100% (27 Oct 2018 13:00) (100% - 100%)          I&O's Detail    26 Oct 2018 07:01  -  27 Oct 2018 07:00  --------------------------------------------------------  IN:    Enteral Tube Flush: 60 mL    Glucerna 1.5: 1050 mL    Solution: 50 mL    Solution: 300 mL  Total IN: 1460 mL    OUT:    Incontinent per Collection Ba mL  Total OUT: 500 mL    Total NET: 960 mL      27 Oct 2018 07:01  -  27 Oct 2018 13:16  --------------------------------------------------------  IN:    Enteral Tube Flush: 60 mL    Free Water: 350 mL    Glucerna 1.5: 300 mL  Total IN: 710 mL    OUT:  Total OUT: 0 mL    Total NET: 710 mL            LABS:                        13.1   10.2  )-----------( 124      ( 27 Oct 2018 07:51 )             40.1     10    147<H>  |  113<H>  |  86.0<H>  ----------------------------<  181<H>  5.2   |  22.0  |  1.50<H>    Ca    9.6      27 Oct 2018 07:51  Phos  2.4     10-27  Mg     2.9     10-27            CAPILLARY BLOOD GLUCOSE            CULTURES:  Culture Results:   >100,000 CFU/ml Klebsiella oxytoca (10-21-18 @ 16:13)      Physical Examination:    General: Intubated and nonresponsive to verbal stimuli. No spontaneous movements    HEENT: No signs of trauma or acute injury. Pupils 4 mm B/L and sluggishly responsive  Symmetric.    PULM: Good equal BS decreased at bases     CVS: Regular rate and rhythm, no murmurs, rubs, or gallops    ABD: Soft, nondistended, nontender, normoactive bowel sounds, no masses    EXT: No edema, nontender    Neuro: Unresponsive to verbal stimuli, mild withdrawl to painful stimuli only, no spontaneous movements     CRITICAL CARE TIME SPENT: 35

## 2018-10-27 NOTE — PROVIDER CONTACT NOTE (EICU) - ACTION/TREATMENT ORDERED:
morning labs ordered
LIVE ON REFERENCE # [id number]   BEDSIDE RN NOTIFIED
Patient responded to lopressor 5mg, HR <100

## 2018-10-28 DIAGNOSIS — J96.01 ACUTE RESPIRATORY FAILURE WITH HYPOXIA: ICD-10-CM

## 2018-10-28 DIAGNOSIS — I61.0 NONTRAUMATIC INTRACEREBRAL HEMORRHAGE IN HEMISPHERE, SUBCORTICAL: ICD-10-CM

## 2018-10-28 DIAGNOSIS — R40.20 UNSPECIFIED COMA: ICD-10-CM

## 2018-10-28 PROCEDURE — 99291 CRITICAL CARE FIRST HOUR: CPT

## 2018-10-28 RX ORDER — METOPROLOL TARTRATE 50 MG
5 TABLET ORAL ONCE
Qty: 0 | Refills: 0 | Status: COMPLETED | OUTPATIENT
Start: 2018-10-28 | End: 2018-10-28

## 2018-10-28 RX ORDER — AMIODARONE HYDROCHLORIDE 400 MG/1
150 TABLET ORAL ONCE
Qty: 0 | Refills: 0 | Status: COMPLETED | OUTPATIENT
Start: 2018-10-28 | End: 2018-10-28

## 2018-10-28 RX ADMIN — AMLODIPINE BESYLATE 5 MILLIGRAM(S): 2.5 TABLET ORAL at 05:16

## 2018-10-28 RX ADMIN — Medication 650 MILLIGRAM(S): at 14:30

## 2018-10-28 RX ADMIN — Medication 5 MILLIGRAM(S): at 06:47

## 2018-10-28 RX ADMIN — Medication 650 MILLIGRAM(S): at 03:46

## 2018-10-28 RX ADMIN — Medication 650 MILLIGRAM(S): at 20:16

## 2018-10-28 RX ADMIN — Medication 650 MILLIGRAM(S): at 20:30

## 2018-10-28 RX ADMIN — Medication 10 MILLIGRAM(S): at 20:17

## 2018-10-28 RX ADMIN — PANTOPRAZOLE SODIUM 40 MILLIGRAM(S): 20 TABLET, DELAYED RELEASE ORAL at 11:37

## 2018-10-28 RX ADMIN — Medication 650 MILLIGRAM(S): at 04:00

## 2018-10-28 RX ADMIN — CHLORHEXIDINE GLUCONATE 15 MILLILITER(S): 213 SOLUTION TOPICAL at 17:22

## 2018-10-28 RX ADMIN — CHLORHEXIDINE GLUCONATE 15 MILLILITER(S): 213 SOLUTION TOPICAL at 05:16

## 2018-10-28 RX ADMIN — AMIODARONE HYDROCHLORIDE 618 MILLIGRAM(S): 400 TABLET ORAL at 06:47

## 2018-10-28 NOTE — PROGRESS NOTE ADULT - PROBLEM SELECTOR PROBLEM 4
Type 2 diabetes mellitus without complication, without long-term current use of insulin Acute respiratory failure with hypoxia

## 2018-10-28 NOTE — PROGRESS NOTE ADULT - PROBLEM SELECTOR PROBLEM 1
Hypertensive urgency Nontraumatic subcortical hemorrhage of cerebral hemisphere, unspecified laterality

## 2018-10-28 NOTE — PROGRESS NOTE ADULT - SUBJECTIVE AND OBJECTIVE BOX
Patient is a 89y old  Female who presents with a chief complaint of ICH (26 Oct 2018 14:43)      BRIEF HOSPITAL COURSE:   89F with HTN, dementia, was brought to the ER after being found this morning unarousable and sonorously breathing in her bed. SHe was last seen normal at 8pm last night.  In the ER she was intubated for airway protection., She was unresponsive to voice or command and only withdrew to painful stimuli. Her BP was noted to be greater than 200. A STAT head Showed diffuse and extensive intra-parenchymal and intraventricular with leftward shift and deviation.  Coags were noted to be normal and IV Nicardipine was initiated for BP control.   Over the next several days, there was no improvement in her neurologic condition. She was able to liberate from the nicardipine.  Initially she was able to tolerate some SBTs, however over the last 2-3 days, she has not tolerated any SBT, she immediately failis with high RR and very low TVs.  Neurologically her exam only has very minimal brain stem reflexes on no sedation          PAST MEDICAL & SURGICAL HISTORY:  Oral phase dysphagia  Fall, initial encounter  SDH (subdural hematoma)  CVA (cerebral vascular accident)  Diabetes  Atrial fibrillation  High cholesterol  Hypertension  Delivered by  section  S/P tonsillectomy      Review of Systems:  CONSTITUTIONAL: No fever, chills, or fatigue  EYES: No eye pain, visual disturbances, or discharge  ENMT:  No difficulty hearing, tinnitus, vertigo; No sinus or throat pain  NECK: No pain or stiffness  RESPIRATORY: No cough, wheezing, chills or hemoptysis; No shortness of breath  CARDIOVASCULAR: No chest pain, palpitations, dizziness, or leg swelling  GASTROINTESTINAL: No abdominal or epigastric pain. No nausea, vomiting, or hematemesis; No diarrhea or constipation. No melena or hematochezia.  GENITOURINARY: No dysuria, frequency, hematuria, or incontinence  NEUROLOGICAL: No headaches, memory loss, loss of strength, numbness, or tremors  SKIN: No itching, burning, rashes, or lesions   MUSCULOSKELETAL: No joint pain or swelling; No muscle, back, or extremity pain  PSYCHIATRIC: No depression, anxiety, mood swings, or difficulty sleeping      Medications:    amLODIPine   Tablet 5 milliGRAM(s) Oral daily  hydrALAZINE Injectable 10 milliGRAM(s) IV Push every 4 hours PRN      acetaminophen    Suspension .. 650 milliGRAM(s) Oral every 6 hours PRN  fentaNYL    Injectable 50 MICROGram(s) IV Push every 2 hours PRN        pantoprazole   Suspension 40 milliGRAM(s) Oral daily            chlorhexidine 0.12% Liquid 15 milliLiter(s) Oral Mucosa two times a day        Mode: AC/ CMV (Assist Control/ Continuous Mandatory Ventilation)  RR (machine): 14  TV (machine): 360  FiO2: 30  PEEP: 5  MAP: 8  PIP: 24      ICU Vital Signs Last 24 Hrs  T(C): 38 (28 Oct 2018 15:00), Max: 39.2 (27 Oct 2018 20:00)  T(F): 100.4 (28 Oct 2018 15:00), Max: 102.6 (27 Oct 2018 20:00)  HR: 97 (28 Oct 2018 17:02) (81 - 180)  BP: 132/60 (28 Oct 2018 17:02) (88/61 - 175/76)  BP(mean): 87 (28 Oct 2018 17:02) (71 - 110)  ABP: --  ABP(mean): --  RR: 17 (28 Oct 2018 17:02) (14 - 35)  SpO2: 100% (28 Oct 2018 17:02) (100% - 100%)          I&O's Detail    27 Oct 2018 07:01  -  28 Oct 2018 07:00  --------------------------------------------------------  IN:    Enteral Tube Flush: 60 mL    Free Water: 1050 mL    Glucerna 1.5: 1100 mL    multiple electrolytes Injection Type 1multiple electrolytes Injection Type 1: 1000 mL  Total IN: 3210 mL    OUT:    Incontinent per Collection Ba mL  Total OUT: 750 mL    Total NET: 2460 mL      28 Oct 2018 07:01  -  28 Oct 2018 17:13  --------------------------------------------------------  IN:    Free Water: 350 mL    Glucerna 1.5: 450 mL  Total IN: 800 mL    OUT:  Total OUT: 0 mL    Total NET: 800 mL            LABS:                        13.1   10.2  )-----------( 124      ( 27 Oct 2018 07:51 )             40.1     10-27    147<H>  |  113<H>  |  86.0<H>  ----------------------------<  181<H>  5.2   |  22.0  |  1.50<H>    Ca    9.6      27 Oct 2018 07:51  Phos  2.4     10-27  Mg     2.9     10-27            CAPILLARY BLOOD GLUCOSE            CULTURES:  C Diff by PCR Result: Shashi (10-27-18 @ 20:36)      Physical Examination:    General: Unresponsive to verbal or painful stimuli, on full vent support    HEENT: Pupils are dilated with minimal response, No corneal reflex. No Gag reflex, Minimal cough reflex      PULM: Clear to auscultation bilaterally, no significant sputum production    CVS: Regular rate and rhythm, no murmurs, rubs, or gallops    ABD: Soft, nondistended, nontender, normoactive bowel sounds, no masses    EXT: No edema, nontender, There is no spontaneous movement of any extremities    SKIN: Warm and well perfused, no rashes noted., 2+ edema B/L arms and legs     RADIOLOGY: ***    CRITICAL CARE TIME SPENT: *** Patient is a 89y old  Female who presents with a chief complaint of ICH (26 Oct 2018 14:43)      BRIEF HOSPITAL COURSE:   89F with HTN, dementia, was brought to the ER after being found this morning unarousable and sonorously breathing in her bed. SHe was last seen normal at 8pm last night.  In the ER she was intubated for airway protection., She was unresponsive to voice or command and only withdrew to painful stimuli. Her BP was noted to be greater than 200. A STAT head Showed diffuse and extensive intra-parenchymal and intraventricular with leftward shift and deviation.  Coags were noted to be normal and IV Nicardipine was initiated for BP control.   Over the next several days, there was no improvement in her neurologic condition. She was able to liberate from the nicardipine.  Initially she was able to tolerate some SBTs, however over the last 2-3 days, she has not tolerated any SBT, she immediately failis with high RR and very low TVs.  Neurologically her exam only has very minimal brain stem reflexes on no sedation          PAST MEDICAL & SURGICAL HISTORY:  Oral phase dysphagia  Fall, initial encounter  SDH (subdural hematoma)  CVA (cerebral vascular accident)  Diabetes  Atrial fibrillation  High cholesterol  Hypertension  Delivered by  section  S/P tonsillectomy      Review of Systems:  CONSTITUTIONAL: No fever, chills, or fatigue  EYES: No eye pain, visual disturbances, or discharge  ENMT:  No difficulty hearing, tinnitus, vertigo; No sinus or throat pain  NECK: No pain or stiffness  RESPIRATORY: No cough, wheezing, chills or hemoptysis; No shortness of breath  CARDIOVASCULAR: No chest pain, palpitations, dizziness, or leg swelling  GASTROINTESTINAL: No abdominal or epigastric pain. No nausea, vomiting, or hematemesis; No diarrhea or constipation. No melena or hematochezia.  GENITOURINARY: No dysuria, frequency, hematuria, or incontinence  NEUROLOGICAL: No headaches, memory loss, loss of strength, numbness, or tremors  SKIN: No itching, burning, rashes, or lesions   MUSCULOSKELETAL: No joint pain or swelling; No muscle, back, or extremity pain  PSYCHIATRIC: No depression, anxiety, mood swings, or difficulty sleeping      Medications:    amLODIPine   Tablet 5 milliGRAM(s) Oral daily  hydrALAZINE Injectable 10 milliGRAM(s) IV Push every 4 hours PRN      acetaminophen    Suspension .. 650 milliGRAM(s) Oral every 6 hours PRN  fentaNYL    Injectable 50 MICROGram(s) IV Push every 2 hours PRN        pantoprazole   Suspension 40 milliGRAM(s) Oral daily            chlorhexidine 0.12% Liquid 15 milliLiter(s) Oral Mucosa two times a day        Mode: AC/ CMV (Assist Control/ Continuous Mandatory Ventilation)  RR (machine): 14  TV (machine): 360  FiO2: 30  PEEP: 5  MAP: 8  PIP: 24      ICU Vital Signs Last 24 Hrs  T(C): 38 (28 Oct 2018 15:00), Max: 39.2 (27 Oct 2018 20:00)  T(F): 100.4 (28 Oct 2018 15:00), Max: 102.6 (27 Oct 2018 20:00)  HR: 97 (28 Oct 2018 17:02) (81 - 180)  BP: 132/60 (28 Oct 2018 17:02) (88/61 - 175/76)  BP(mean): 87 (28 Oct 2018 17:02) (71 - 110)  ABP: --  ABP(mean): --  RR: 17 (28 Oct 2018 17:02) (14 - 35)  SpO2: 100% (28 Oct 2018 17:02) (100% - 100%)          I&O's Detail    27 Oct 2018 07:01  -  28 Oct 2018 07:00  --------------------------------------------------------  IN:    Enteral Tube Flush: 60 mL    Free Water: 1050 mL    Glucerna 1.5: 1100 mL    multiple electrolytes Injection Type 1multiple electrolytes Injection Type 1: 1000 mL  Total IN: 3210 mL    OUT:    Incontinent per Collection Ba mL  Total OUT: 750 mL    Total NET: 2460 mL      28 Oct 2018 07:01  -  28 Oct 2018 17:13  --------------------------------------------------------  IN:    Free Water: 350 mL    Glucerna 1.5: 450 mL  Total IN: 800 mL    OUT:  Total OUT: 0 mL    Total NET: 800 mL            LABS:                        13.1   10.2  )-----------( 124      ( 27 Oct 2018 07:51 )             40.1     10-27    147<H>  |  113<H>  |  86.0<H>  ----------------------------<  181<H>  5.2   |  22.0  |  1.50<H>    Ca    9.6      27 Oct 2018 07:51  Phos  2.4     10-27  Mg     2.9     10-27            CAPILLARY BLOOD GLUCOSE            CULTURES:  C Diff by PCR Result: Shashi (10-27-18 @ 20:36)      Physical Examination:    General: Unresponsive to verbal or painful stimuli, on full vent support    HEENT: Pupils are dilated with minimal response, No corneal reflex. No Gag reflex, Minimal cough reflex      PULM: Clear to auscultation bilaterally, no significant sputum production    CVS: Regular rate and rhythm, no murmurs, rubs, or gallops    ABD: Soft, nondistended, nontender, normoactive bowel sounds, no masses    EXT: No edema, nontender, There is no spontaneous movement of any extremities    SKIN: Warm and well perfused, no rashes noted., 2+ edema B/L arms and legs     RADIOLOGY: 	  Head CT:  Findings: Contiguous axial images from the skull base through the vertex   was performed withoutadministration of intravenous contrast. Sagittal   and coronal reformat images are also provided.    Correlation made with prior study 3/19/2018.    Large right temporal/temporoparietal hemorrhage with large   intraventricular extension distending and nearly filling the entire right   lateral ventricle, third ventricle and fourth ventricle. Blood is seen   also extending into the left lateral ventricle which is also dilated.   There is approximate 1.2 cm leftward midline shift. Gyral hemorrhages.  Acute right subdural blood also seen measuring up to approximately 3.5 mm   width with extension along the sphenoid ridges to the right para   mesencephalic cistern. There is impression upon the right side of the   suprasellar cistern which is otherwise clear. The blood filled fourth   ventricle is midline.    Chronic moderate white matter small vessel ischemic change noted.    The skull is intact. The visualized portion of the paranasal sinuses and   mastoid air cells are clear.    Impression:    Extensive acute intraparenchymal and intraventricular hemorrhage with   leftward midline shift and hydrocephalus as described. Acute right   subdural also noted as described.      CRITICAL CARE TIME SPENT:  I spent 45 mins of time evaluating and managing this Critical patient including extensive conversation with [facility] family

## 2018-10-28 NOTE — PROGRESS NOTE ADULT - SUBJECTIVE AND OBJECTIVE BOX
Patient is a 89y old  Female who presents with a chief complaint of ICH (26 Oct 2018 14:43)      BRIEF HOSPITAL COURSE: 89F with HTN, dementia, was brought to the ER after being found this morning unarousable and sonorously breathing in her bed. SHe was last seen normal at 8pm last night.  In the ER she was intubated for airway protection., She was unresponsive to voice or command and only withdrew to painful stimuli. Her BP was noted to be greater than 200. A STAT head Showed diffuse and extensive intra-parenchymal and intraventricular with leftward shift and deviation.      Events last 24 hours: SVT overnight requiring IV lopressor    PAST MEDICAL & SURGICAL HISTORY:  Oral phase dysphagia  Fall, initial encounter  SDH (subdural hematoma)  CVA (cerebral vascular accident)  Diabetes  Atrial fibrillation  High cholesterol  Hypertension  Delivered by  section  S/P tonsillectomy      Review of Systems:  Could not obtain ROS due to underlying mentation    Medications:    amLODIPine   Tablet 5 milliGRAM(s) Oral daily  hydrALAZINE Injectable 10 milliGRAM(s) IV Push every 4 hours PRN      acetaminophen    Suspension .. 650 milliGRAM(s) Oral every 6 hours PRN  fentaNYL    Injectable 50 MICROGram(s) IV Push every 2 hours PRN        pantoprazole   Suspension 40 milliGRAM(s) Oral daily            chlorhexidine 0.12% Liquid 15 milliLiter(s) Oral Mucosa two times a day        Mode: CPAP with PS  FiO2: 30  PEEP: 5  PS: 5  MAP: 8      ICU Vital Signs Last 24 Hrs  T(C): 37.6 (28 Oct 2018 06:30), Max: 39.2 (27 Oct 2018 20:00)  T(F): 99.7 (28 Oct 2018 06:30), Max: 102.6 (27 Oct 2018 20:00)  HR: 86 (28 Oct 2018 10:00) (81 - 180)  BP: 145/67 (28 Oct 2018 10:00) (88/61 - 175/76)  BP(mean): 96 (28 Oct 2018 10:00) (71 - 110)  ABP: --  ABP(mean): --  RR: 14 (28 Oct 2018 10:00) (14 - 60)  SpO2: 100% (28 Oct 2018 10:00) (99% - 100%)          I&O's Summary    27 Oct 2018 07:01  -  28 Oct 2018 07:00  --------------------------------------------------------  IN: 3210 mL / OUT: 750 mL / NET: 2460 mL    28 Oct 2018 07:  -  28 Oct 2018 11:32  --------------------------------------------------------  IN: 100 mL / OUT: 0 mL / NET: 100 mL                          13.1   10.2  )-----------( 124      ( 27 Oct 2018 07:51 )             40.1   10    147<H>  |  113<H>  |  86.0<H>  ----------------------------<  181<H>  5.2   |  22.0  |  1.50<H>    Ca    9.6      27 Oct 2018 07:51  Phos  2.4     10-27  Mg     2.9     10-27        CULTURES:  Culture Results:   >100,000 CFU/ml Klebsiella oxytoca (10-21-18 @ 16:13)      Physical Examination:    General: Intubated not sedated BS reflexs +    HEENT: No signs of trauma or acute injury. Pupils  sluggish b/l possible H/0 eye sx    PULM: Good equal BS decreased at bases     CVS: Regular rate and rhythm, no murmurs, rubs, or gallops    ABD: Soft, nondistended, nontender, normoactive bowel sounds, no masses    EXT: No edema, nontender    Neuro: Unresponsive to verbal stimuli, mild withdrawl to painful stimuli only, no spontaneous movements     CRITICAL CARE TIME SPENT: 35

## 2018-10-28 NOTE — PROGRESS NOTE ADULT - ASSESSMENT
89 yo female with pmh/o afib, SDH, HTN, DMII, who is admitted with HTN urgency and troponemia likely secondary to demand ischemia. Pt also noted to be SOB and febrile and son reports h/o dysphagia, this is concerning for PNA. 89F with a devastating hemorrhagic CVA with severe brain injury, who is comatose on full vent support with no neurologic improvement since admission. SHe reamin full comatose and has had worsening of her basic brain stem reflexes, with absence of gag, and corneal reflexes, and minimal respiratory effort.       I attempted to speak with the patient's son Miky, daughter Kriss and daughter Meena.  Miky claims to be the HCP, however there is documentation to support this and sulema other family doesnt believe there is any such paperwork in place.     There is obvious anger about the condition of the patient on the part of Miky. In speaking with him he is unwilling to discuss decision making regarding removal of ventilator support and comfort measures vs tracheostomy/PEG and long term care. Miky refused to listen to discussion about these options and what each involves. He clearly has alot of anger and was yelling at me in the room.    I further more explained that her neurologic condition had further deteriorated,  making any trial extubation attempts futile and medically inappropriate now.       Both Sisters, understand the severity of the patient's condition and are in agreement , that she should be removed from the ventilator, and allowed to pass away peacefully and naturally, without artificial support. They collectively agree, that if their mother could speak for herself, this is what she would want. AND Not to be kept on machines.     Miky utimately left the room refusing further discussion, saying he wanted 20 days......    He later returned and had a very intimidating and loud screaming episode with Meena, in which he was clearly being forceful and intimidating. I had to step in between him and ask him to be calm and considerate of the other patient's and families in the unit, Ultimately Security was called via "[employer]"    I instructed Kriss and Meena, that written documentation of the HCP was going to be needed and that we would further address the situation and decisions tomorrow

## 2018-10-28 NOTE — PROGRESS NOTE ADULT - PROBLEM SELECTOR PLAN 2
likely due to demand ischemia  continue to trend  pt without active cp  hold AC due to recent SDH  s/p plavix loading dose as above

## 2018-10-28 NOTE — PROGRESS NOTE ADULT - PROBLEM SELECTOR PLAN 1
admit to telemetry  EKG reviewed  EKG prn chest pain  NTG prn chest pain  ASA 81 mg ordered  Plavix given in ED as pt with recent SDH will hold additional doses  serial troponin, first 0.09  TTE reviewed grade I diastolic dysfunction EF 70-75%  HbA1c 5.7  lipid panel recently performed-results reviewed, total chol 205  cardiology consulted-awaiting call back [place] cardiology Likely related to HTN disease and age   Advanced directives in place  Needs further clarification of HCP and need decisions regarding Trach.PEG vs elective extubation and comfort measures

## 2018-10-28 NOTE — PROGRESS NOTE ADULT - ASSESSMENT
89F with a large non-traumatic, spontaneous hemorrhagic CVA, with intraparenchymal and intraventricular hemorrhage. With uncontrolled HTN and respiratory failure.    1: Non-traumatic hemorrhagic CVA (IPH/IVH) complicated by ac encephalopathy  2: Acute hypoxic resp failure  3: A fib  4: DM-2      Neuro:   Remains comatose (BS reflexes +) off of sedative   EEG:  burst-suppression no NCSE as per neuro  No neurosx intervention  S/p nicardipine drip  Poor prognosis confirmed by neurology  Palliative care involved and input appreciated: currently DNR  Our team reached out to family again for deciding on further goals of care (comfort vs Trach and Peg)    Cardiovascular:   HD stable for now  C/w norvac/ PRN hydralazine      Resp/Chest:   Remains on full volume AC mode, failed PSV/ SBT in AM  VAP ppx bundle    Gi/Nutrition:   Started Feeding  Diarrhea improved  Ruled out CDAD  PPI    ID:   5 day course of IV CTX through this hospital course    Nephro/Electrolyte/Acid-Base:   Stable    Endocrinology:   Stable    Haem/Oncology:   SCD

## 2018-10-28 NOTE — PROGRESS NOTE ADULT - PROBLEM SELECTOR PLAN 4
HISS  hold metformin Continue vent support with 6cc/kg, wean FIO2 for SAO2 > 90%   COntinue nutrition   no further SBT as pt no longer tolerating with worsening neuro status

## 2018-10-29 PROCEDURE — 99233 SBSQ HOSP IP/OBS HIGH 50: CPT

## 2018-10-29 PROCEDURE — 99291 CRITICAL CARE FIRST HOUR: CPT

## 2018-10-29 RX ORDER — SIMVASTATIN 20 MG/1
10 TABLET, FILM COATED ORAL AT BEDTIME
Qty: 0 | Refills: 0 | Status: DISCONTINUED | OUTPATIENT
Start: 2018-10-29 | End: 2018-11-02

## 2018-10-29 RX ORDER — PSYLLIUM SEED (WITH DEXTROSE)
1 POWDER (GRAM) ORAL DAILY
Qty: 0 | Refills: 0 | Status: DISCONTINUED | OUTPATIENT
Start: 2018-10-29 | End: 2018-11-02

## 2018-10-29 RX ADMIN — Medication 650 MILLIGRAM(S): at 05:20

## 2018-10-29 RX ADMIN — Medication 1 PACKET(S): at 12:03

## 2018-10-29 RX ADMIN — CHLORHEXIDINE GLUCONATE 15 MILLILITER(S): 213 SOLUTION TOPICAL at 17:59

## 2018-10-29 RX ADMIN — CHLORHEXIDINE GLUCONATE 15 MILLILITER(S): 213 SOLUTION TOPICAL at 05:20

## 2018-10-29 RX ADMIN — Medication 650 MILLIGRAM(S): at 18:00

## 2018-10-29 RX ADMIN — Medication 650 MILLIGRAM(S): at 05:47

## 2018-10-29 RX ADMIN — Medication 650 MILLIGRAM(S): at 18:45

## 2018-10-29 RX ADMIN — AMLODIPINE BESYLATE 5 MILLIGRAM(S): 2.5 TABLET ORAL at 05:20

## 2018-10-29 RX ADMIN — SIMVASTATIN 10 MILLIGRAM(S): 20 TABLET, FILM COATED ORAL at 23:00

## 2018-10-29 RX ADMIN — PANTOPRAZOLE SODIUM 40 MILLIGRAM(S): 20 TABLET, DELAYED RELEASE ORAL at 12:03
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
## 2018-10-29 NOTE — PROGRESS NOTE ADULT - ASSESSMENT
89F with a large non-traumatic, spontaneous hemorrhagic CVA, with intraparenchymal and intraventricular hemorrhage. With uncontrolled HTN and respiratory failure.    1: Non-traumatic hemorrhagic CVA (IPH/IVH) complicated by ac encephalopathy  2: Acute hypoxic resp failure  3: A fib  4: DM-2      Neuro:   Remains comatose (BS reflexes +) off of sedative , gaurded prognossi  EEG:  burst-suppression no NCSE as per neuro  No neurosx intervention  S/p nicardipine drip  Poor prognosis confirmed by neurology  Palliative care involved and input appreciated: currently DNR  Our team reached out to family again yesterday and there is strong disagreement between patient's son and daughter about perusing with goals of care to the point that we had to call for code grey yesterday; I have talked to palliative today personally and they will try to address it today and if we can not come to a conclusion, we might have to involve Ethics by tomorrow to make decision about overall goals of care     Cardiovascular:   HD stable for now  C/w norvac/ PRN hydralazine      Resp/Chest:   Remains on full volume AC mode, could not perform SBT today  VAP ppx bundle    Gi/Nutrition:   Started Feeding  Diarrhea improved  Ruled out CDAD  PPI  Antidiarrheal ageant added     ID:   5 day course of IV CTX through this hospital course    Nephro/Electrolyte/Acid-Base:   Stable    Endocrinology:   Stable    Haem/Oncology:   SCD

## 2018-10-29 NOTE — CHART NOTE - NSCHARTNOTEFT_GEN_A_CORE
Source: Patient [ ]  Family [ ]   other [x] EMR    Current Diet: Diet, NPO with Tube Feed:   Tube Feeding Modality: Orogastric  Glucerna 1.5 Bob  Total Volume for 24 Hours (mL): 1200  Continuous  Starting Tube Feed Rate {mL per Hour}: 50  Until Goal Tube Feed Rate (mL per Hour): 50  Tube Feed Duration (in Hours): 24  Tube Feed Start Time: 11:00 (10-26-18 @ 10:54)    Enteral /Parenteral Nutrition: Tube feeds running at goal rate of 50 ml/hr (x20 hrs) which is providing 1000 ml, 1500 kcal, 82.5g protein, 759 ml free water, and 100% of RDIs for vitamins/minerals. Also receiving additional free water flushes, 350ml q6 hrs to provide 1400ml/day.     Current Weight:   (10/28) 105.8 lbs  (10/26) 92.5 lbs  (10/25) 94.7 lbs  (10/24) 97 lbs  (10/23) 104.4 lbs  (10/22) 103.3 lbs      % Weight Change:  Noted with 13.3lb wt gain x 2 days, likely related to fluid as pt with 2+ BL leg and arm edema per documentation. Continue to trend closely and maintain strict Is&Os.     Pertinent Medications: MEDICATIONS  (STANDING):  amLODIPine   Tablet 5 milliGRAM(s) Oral daily  chlorhexidine 0.12% Liquid 15 milliLiter(s) Oral Mucosa two times a day  pantoprazole   Suspension 40 milliGRAM(s) Oral daily    MEDICATIONS  (PRN):  acetaminophen    Suspension .. 650 milliGRAM(s) Oral every 6 hours PRN Temp greater or equal to 38C (100.4F)  hydrALAZINE Injectable 10 milliGRAM(s) IV Push every 4 hours PRN SBP > 170    Pertinent Labs: (10/27) Na 147, BUN 86, Cr 1.5, Glu 181    Skin: stage II pressure injury to left ear and suspected DTI to right buttock per documentation    Nutrition focused physical exam conducted - found signs of malnutrition [ ]absent [ ]present    Subcutaneous fat loss: [ ] Orbital fat pads region, [ ]Buccal fat region, [ ]Triceps region,  [ ]Ribs region    Muscle wasting: [ ]Temples region, [ ]Clavicle region, [ ]Shoulder region, [ ]Scapula region, [ ]Interosseous region,  [ ]thigh region, [ ]Calf region    Estimated Needs:   [x] no change since previous assessment  [ ] recalculated:     Current Nutrition Diagnosis: Pt remains with malnutrition (severe, acute) related to inability to receive sufficient protein-energy associated with increased nutrient needs 2/2 suspected DTI to right buttock and stage II pressure injury to left ear, massive intraparenchymal hemorrhage and acute respiratory failure requiring intubation, and poor prognosis as evidenced by significant wt loss of 10.4% x 5 days, moderate muscle loss of temples, and 2+ BL edema to legs and arms per documentation. Most recent weight with gain noted, however, likely related to edema. Continue to trend daily and maintain strict Is&Os.   Recommendations:   1) Continue tube feeds per pt family wishes.  2) Recommend MVI and vitamin C 500mg daily to aid in healing.    Monitoring and Evaluation:   [ ] PO intake [x] Tolerance to diet prescription [X] Weights  [X] Follow up per protocol [X] Labs Source: Patient [ ]  Family [ ]   other [x] EMR    Current Diet: Diet, NPO with Tube Feed:   Tube Feeding Modality: Orogastric  Glucerna 1.5 Bob  Total Volume for 24 Hours (mL): 1200  Continuous  Starting Tube Feed Rate {mL per Hour}: 50  Until Goal Tube Feed Rate (mL per Hour): 50  Tube Feed Duration (in Hours): 24  Tube Feed Start Time: 11:00 (10-26-18 @ 10:54)    Enteral /Parenteral Nutrition: Tube feeds running at goal rate of 50 ml/hr (x20 hrs) which is providing 1000 ml, 1500 kcal, 82.5g protein, 759 ml free water, and 100% of RDIs for vitamins/minerals. Also receiving additional free water flushes, 350ml q6 hrs to provide 1400ml/day.     Current Weight:   (10/28) 105.8 lbs  (10/26) 92.5 lbs  (10/25) 94.7 lbs  (10/24) 97 lbs  (10/23) 104.4 lbs  (10/22) 103.3 lbs      % Weight Change:  Noted with 13.3lb wt gain x 2 days, likely related to fluid as pt with 2+ BL leg and arm edema per documentation. Continue to trend closely and maintain strict Is&Os.     Pertinent Medications: MEDICATIONS  (STANDING):  amLODIPine   Tablet 5 milliGRAM(s) Oral daily  chlorhexidine 0.12% Liquid 15 milliLiter(s) Oral Mucosa two times a day  pantoprazole   Suspension 40 milliGRAM(s) Oral daily    MEDICATIONS  (PRN):  acetaminophen    Suspension .. 650 milliGRAM(s) Oral every 6 hours PRN Temp greater or equal to 38C (100.4F)  hydrALAZINE Injectable 10 milliGRAM(s) IV Push every 4 hours PRN SBP > 170    Pertinent Labs: (10/27) Na 147, BUN 86, Cr 1.5, Glu 181    Skin: stage II pressure injury to left ear and suspected DTI to right buttock per documentation    Nutrition focused physical exam conducted - found signs of malnutrition [ ]absent [x]present    Subcutaneous fat loss: [ ] Orbital fat pads region, [ ]Buccal fat region, [ ]Triceps region,  [ ]Ribs region    Muscle wasting: [x]Temples region, [ ]Clavicle region, [ ]Shoulder region, [ ]Scapula region, [ ]Interosseous region,  [ ]thigh region, [ ]Calf region    Estimated Needs:   [x] no change since previous assessment  [ ] recalculated:     Current Nutrition Diagnosis: Pt remains with malnutrition (severe, acute) related to inability to receive sufficient protein-energy associated with increased nutrient needs 2/2 suspected DTI to right buttock and stage II pressure injury to left ear, massive intraparenchymal hemorrhage and acute respiratory failure requiring intubation, and poor prognosis as evidenced by significant wt loss of 10.4% x 5 days, moderate muscle loss of temples, and 2+ BL edema to legs and arms per documentation. Most recent weight with gain noted, however, likely related to edema. Continue to trend daily and maintain strict Is&Os.   Recommendations:   1) Continue tube feeds per pt family wishes.  2) Recommend MVI and vitamin C 500mg daily to aid in healing.    Monitoring and Evaluation:   [ ] PO intake [x] Tolerance to diet prescription [X] Weights  [X] Follow up per protocol [X] Labs Source: Patient [ ]  Family [ ]   other [x] EMR    Current Diet: Diet, NPO with Tube Feed:   Tube Feeding Modality: Orogastric  Glucerna 1.5 Bob  Total Volume for 24 Hours (mL): 1200  Continuous  Starting Tube Feed Rate {mL per Hour}: 50  Until Goal Tube Feed Rate (mL per Hour): 50  Tube Feed Duration (in Hours): 24  Tube Feed Start Time: 11:00 (10-26-18 @ 10:54)    Enteral /Parenteral Nutrition: Tube feeds running at goal rate of 50 ml/hr (x20 hrs) which is providing 1000 ml, 1500 kcal, 82.5g protein, 759 ml free water, and 100% of RDIs for vitamins/minerals. Also receiving additional free water flushes, 350ml q6 hrs to provide 1400ml/day.     Current Weight:   (10/28) 105.8 lbs  (10/26) 92.5 lbs  (10/25) 94.7 lbs  (10/24) 97 lbs  (10/23) 104.4 lbs  (10/22) 103.3 lbs      % Weight Change:  Noted with 13.3lb wt gain x 2 days, likely related to fluid as pt with 2+ BL leg and arm edema per documentation. Continue to trend closely and maintain strict Is&Os.     Pertinent Medications: MEDICATIONS  (STANDING):  amLODIPine   Tablet 5 milliGRAM(s) Oral daily  chlorhexidine 0.12% Liquid 15 milliLiter(s) Oral Mucosa two times a day  pantoprazole   Suspension 40 milliGRAM(s) Oral daily    MEDICATIONS  (PRN):  acetaminophen    Suspension .. 650 milliGRAM(s) Oral every 6 hours PRN Temp greater or equal to 38C (100.4F)  hydrALAZINE Injectable 10 milliGRAM(s) IV Push every 4 hours PRN SBP > 170    Pertinent Labs: (10/27) Na 147, BUN 86, Cr 1.5, Glu 181    Skin: stage II pressure injury to left ear and suspected DTI to right buttock per documentation    Nutrition focused physical exam conducted - found signs of malnutrition [ ]absent [x]present    Subcutaneous fat loss: [ ] Orbital fat pads region, [ ]Buccal fat region, [ ]Triceps region,  [ ]Ribs region    Muscle wasting: [x]Temples region, [ ]Clavicle region, [ ]Shoulder region, [ ]Scapula region, [ ]Interosseous region,  [ ]thigh region, [ ]Calf region    Estimated Needs:   [x] no change since previous assessment  [ ] recalculated:     Current Nutrition Diagnosis: Pt remains with malnutrition (severe, acute) related to inability to receive sufficient protein-energy associated with increased nutrient needs 2/2 suspected DTI to right buttock and stage II pressure injury to left ear, massive intraparenchymal hemorrhage and acute respiratory failure requiring intubation, and poor prognosis as evidenced by significant wt loss of 10.4% x 5 days, moderate muscle loss of temples, and 2+ BL edema to legs and arms per documentation. Most recent weight with gain noted, however, likely related to edema. Continue to trend daily and maintain strict Is&Os.     Recommendations:   1) Continue tube feeds per pt family wishes.  2) Recommend MVI and vitamin C 500mg daily to aid in healing.    Monitoring and Evaluation:   [ ] PO intake [x] Tolerance to diet prescription [X] Weights  [X] Follow up per protocol [X] Labs

## 2018-10-29 NOTE — PROGRESS NOTE ADULT - SUBJECTIVE AND OBJECTIVE BOX
CC: follow up symptoms GOC  INTERVAL HPI/OVERNIGHT EVENTS:  Code gray called yesterday - dispute between son and niece    PRESENT SYMPTOMS: SOURCE:  Patient/Family/Team    PAIN SCALE:  0 = none  1 = mild   2 = moderate  3 = severe    Pain:     Dyspnea:  [ ] YES [ ] NO  na on vent  Anxiety:  [ ] YES [ ] NO  na obtunded  Fatigue: [x ] YES [ ] NO  Nausea: [ ] YES [ ] NO  na  Loss of Appetite: [ ] YES [ ] NO  na  Other symptoms: __________    MEDICATIONS  (STANDING):  amLODIPine   Tablet 5 milliGRAM(s) Oral daily  chlorhexidine 0.12% Liquid 15 milliLiter(s) Oral Mucosa two times a day  pantoprazole   Suspension 40 milliGRAM(s) Oral daily  psyllium Powder 1 Packet(s) Oral daily  simvastatin 10 milliGRAM(s) Oral at bedtime    MEDICATIONS  (PRN):  acetaminophen    Suspension .. 650 milliGRAM(s) Oral every 6 hours PRN Temp greater or equal to 38C (100.4F)  hydrALAZINE Injectable 10 milliGRAM(s) IV Push every 4 hours PRN SBP > 170      Allergies    No Known Drug Allergies  Shellfish (Rash)    Intolerances      Karnofsky Performance Score/Palliative Performance Status Version 2:   10  %    Vital Signs Last 24 Hrs  T(C): 38.6 (29 Oct 2018 15:05), Max: 38.6 (29 Oct 2018 15:05)  T(F): 101.5 (29 Oct 2018 15:05), Max: 101.5 (29 Oct 2018 15:05)  HR: 106 (29 Oct 2018 15:05) (89 - 116)  BP: 161/85 (29 Oct 2018 15:05) (130/61 - 186/82)  BP(mean): 116 (29 Oct 2018 15:05) (87 - 120)  RR: 16 (29 Oct 2018 15:05) (12 - 26)  SpO2: 100% (29 Oct 2018 15:05) (100% - 100%)    PHYSICAL EXAM:    General: obtunded on vent - AC  HEENT: [x ] normal  [ ] dry mouth  [x ] ET tube/trach    Lungs: [x ] comfortable  on vent    CV: [x ] normal  [ ] tachycardia    GI: [ x] normal  [ ] distended  [ ] tender  [ ] no BS               [ ] PEG/NG/OG tube    : [ ] normal  [ x] incontinent  [ ] oliguria/anuria  [ ] garcia    MSK: [ ] normal  [ ] weakness  [ ] edema             [ ] ambulatory  [ x] bedbound/wheelchair bound    Skin: [ ] normal  [ ] pressure ulcers- Stage_____  [x ] no rash      I&O's Summary    28 Oct 2018 07:01  -  29 Oct 2018 07:00  --------------------------------------------------------  IN: 2720 mL / OUT: 500 mL / NET: 2220 mL    29 Oct 2018 07:01  -  29 Oct 2018 15:47  --------------------------------------------------------  IN: 860 mL / OUT: 0 mL / NET: 860 mL      Thank you for the opportunity to assist with the care of this patient.   [facility] [phone].

## 2018-10-30 LAB
ANION GAP SERPL CALC-SCNC: 12 MMOL/L — SIGNIFICANT CHANGE UP (ref 5–17)
BUN SERPL-MCNC: 73 MG/DL — HIGH (ref 8–20)
CALCIUM SERPL-MCNC: 8.9 MG/DL — SIGNIFICANT CHANGE UP (ref 8.6–10.2)
CHLORIDE SERPL-SCNC: 109 MMOL/L — HIGH (ref 98–107)
CO2 SERPL-SCNC: 26 MMOL/L — SIGNIFICANT CHANGE UP (ref 22–29)
CREAT SERPL-MCNC: 1.19 MG/DL — SIGNIFICANT CHANGE UP (ref 0.5–1.3)
GLUCOSE SERPL-MCNC: 225 MG/DL — HIGH (ref 70–115)
HCT VFR BLD CALC: 35.5 % — LOW (ref 37–47)
HGB BLD-MCNC: 10.9 G/DL — LOW (ref 12–16)
MAGNESIUM SERPL-MCNC: 2.9 MG/DL — HIGH (ref 1.6–2.6)
MCHC RBC-ENTMCNC: 26.8 PG — LOW (ref 27–31)
MCHC RBC-ENTMCNC: 30.7 G/DL — LOW (ref 32–36)
MCV RBC AUTO: 87.4 FL — SIGNIFICANT CHANGE UP (ref 81–99)
PHOSPHATE SERPL-MCNC: 2.1 MG/DL — LOW (ref 2.4–4.7)
PLATELET # BLD AUTO: 267 K/UL — SIGNIFICANT CHANGE UP (ref 150–400)
POTASSIUM SERPL-MCNC: 5.7 MMOL/L — HIGH (ref 3.5–5.3)
POTASSIUM SERPL-SCNC: 5.7 MMOL/L — HIGH (ref 3.5–5.3)
RBC # BLD: 4.06 M/UL — LOW (ref 4.4–5.2)
RBC # FLD: 15.8 % — HIGH (ref 11–15.6)
SODIUM SERPL-SCNC: 147 MMOL/L — HIGH (ref 135–145)
WBC # BLD: 12 K/UL — HIGH (ref 4.8–10.8)
WBC # FLD AUTO: 12 K/UL — HIGH (ref 4.8–10.8)

## 2018-10-30 PROCEDURE — 99291 CRITICAL CARE FIRST HOUR: CPT

## 2018-10-30 PROCEDURE — 99233 SBSQ HOSP IP/OBS HIGH 50: CPT

## 2018-10-30 RX ADMIN — Medication 650 MILLIGRAM(S): at 03:00

## 2018-10-30 RX ADMIN — CHLORHEXIDINE GLUCONATE 15 MILLILITER(S): 213 SOLUTION TOPICAL at 18:24

## 2018-10-30 RX ADMIN — AMLODIPINE BESYLATE 5 MILLIGRAM(S): 2.5 TABLET ORAL at 05:07

## 2018-10-30 RX ADMIN — CHLORHEXIDINE GLUCONATE 15 MILLILITER(S): 213 SOLUTION TOPICAL at 05:06

## 2018-10-30 RX ADMIN — SIMVASTATIN 10 MILLIGRAM(S): 20 TABLET, FILM COATED ORAL at 21:06

## 2018-10-30 RX ADMIN — Medication 650 MILLIGRAM(S): at 12:40

## 2018-10-30 RX ADMIN — PANTOPRAZOLE SODIUM 40 MILLIGRAM(S): 20 TABLET, DELAYED RELEASE ORAL at 12:14

## 2018-10-30 RX ADMIN — Medication 1 PACKET(S): at 12:14

## 2018-10-30 RX ADMIN — Medication 650 MILLIGRAM(S): at 22:00

## 2018-10-30 RX ADMIN — Medication 650 MILLIGRAM(S): at 21:12

## 2018-10-30 RX ADMIN — Medication 650 MILLIGRAM(S): at 02:38

## 2018-10-30 RX ADMIN — Medication 650 MILLIGRAM(S): at 12:15

## 2018-10-30 NOTE — PROGRESS NOTE ADULT - SUBJECTIVE AND OBJECTIVE BOX
CC: follow up GOC  INTERVAL HPI/OVERNIGHT EVENTS:    PRESENT SYMPTOMS: SOURCE:  Patient/Family/Team    PAIN SCALE:  0 = none  1 = mild   2 = moderate  3 = severe    Pain:     Dyspnea:  [ ] YES [ ] NO on Vent support  Anxiety:  [ ] YES [ ] NO  na  Fatigue: [ ] YES [ ] NO  na  Nausea: [ ] YES [ ] NO  na  Loss of Appetite: [ ] YES [ ] NO  na  Other symptoms: __________      MEDICATIONS  (STANDING):  amLODIPine   Tablet 5 milliGRAM(s) Oral daily  chlorhexidine 0.12% Liquid 15 milliLiter(s) Oral Mucosa two times a day  pantoprazole   Suspension 40 milliGRAM(s) Oral daily  psyllium Powder 1 Packet(s) Oral daily  simvastatin 10 milliGRAM(s) Oral at bedtime    MEDICATIONS  (PRN):  acetaminophen    Suspension .. 650 milliGRAM(s) Oral every 6 hours PRN Temp greater or equal to 38C (100.4F)  hydrALAZINE Injectable 10 milliGRAM(s) IV Push every 4 hours PRN SBP > 170      Allergies    No Known Drug Allergies  Shellfish (Rash)    Intolerances      Karnofsky Performance Score/Palliative Performance Status Version 2:  10       %    Vital Signs Last 24 Hrs  T(C): 38.9 (30 Oct 2018 12:00), Max: 38.9 (30 Oct 2018 12:00)  T(F): 102 (30 Oct 2018 12:00), Max: 102 (30 Oct 2018 12:00)  HR: 107 (30 Oct 2018 12:40) (98 - 120)  BP: 155/69 (30 Oct 2018 12:00) (122/64 - 167/90)  BP(mean): 99 (30 Oct 2018 12:00) (87 - 118)  RR: 15 (30 Oct 2018 12:00) (13 - 21)  SpO2: 100% (30 Oct 2018 12:40) (100% - 100%)    PHYSICAL EXAM:    General:  Obtunded     HEENT: [x normal  [ ] dry mouth  x ] ET tube/trach    Lungs: [x] comfortable  on vent support    CV: [x ] normal  [ ] tachycardia    GI: [ x] normal  [ ] distended  [ ] tender  [ ] no BS               [ ] PEG/NG/OG tube    : [ ] normal  [x ] incontinent  [ ] oliguria/anuria  [ ] garcia    MSK: [ ] normal  [ x] weakness  [ ] edema             [ ] ambulatory  [x ] bedbound/wheelchair bound    Skin: [ ] normal  [ ] pressure ulcers- Stage_____  [x ] no rash    LABS:                        10.9   12.0  )-----------( 267      ( 30 Oct 2018 06:07 )             35.5     10-30    147<H>  |  109<H>  |  73.0<H>  ----------------------------<  225<H>  5.7<H>   |  26.0  |  1.19    Ca    8.9      30 Oct 2018 06:07  Phos  2.1     10-30  Mg     2.9     10-30          I&O's Summary    29 Oct 2018 07:01  -  30 Oct 2018 07:00  --------------------------------------------------------  IN: 2375 mL / OUT: 700 mL / NET: 1675 mL    30 Oct 2018 07:01  -  30 Oct 2018 13:13  --------------------------------------------------------  IN: 750 mL / OUT: 0 mL / NET: 750 mL      Thank you for the opportunity to assist with the care of this patient.   [facility] [phone].

## 2018-10-30 NOTE — PROGRESS NOTE ADULT - PROBLEM SELECTOR PLAN 3
spoke with daughter Kristine. She states she spoke to her brother, Jack, who gives full decision making to her.  ( Of note, there is no official Health Care Proxy - next surrogates are the children ).  Kristine states there are a total of 5 other siblings,  who have also left decision making to her. She knows her mother would not want a PEG tube based on a prior conversation Kristine had with her a few years ago. Kristine does not want to prolong her mother's suffering and  wishes to eventually withdraw the vent. However, she wishes to speak to the other siblings first, as well as have other members from out of town to see her  ( family in [place]).  She states she will get back to me this week.  For now, agreeable to NO ESCALATION in care - no further blood work, no pressors, no radiographs. She states if  "God takes her naturally in the meantime, let him take her".     Daughter states she will get back to me this week.   [phone]

## 2018-10-30 NOTE — PROGRESS NOTE ADULT - SUBJECTIVE AND OBJECTIVE BOX
Patient is a 89y old  Female who presents with a chief complaint of ICH (26 Oct 2018 14:43)      BRIEF HOSPITAL COURSE: 89F with HTN, dementia, was brought to the ER after being found this morning unarousable and sonorously breathing in her bed. SHe was last seen normal at 8pm last night.  In the ER she was intubated for airway protection., She was unresponsive to voice or command and only withdrew to painful stimuli. Her BP was noted to be greater than 200. A STAT head Showed diffuse and extensive intra-parenchymal and intraventricular with leftward shift and deviation.      Events last 24 hours: SVT overnight requiring IV lopressor    PAST MEDICAL & SURGICAL HISTORY:  Oral phase dysphagia  Fall, initial encounter  SDH (subdural hematoma)  CVA (cerebral vascular accident)  Diabetes  Atrial fibrillation  High cholesterol  Hypertension  Delivered by  section  S/P tonsillectomy      Review of Systems:  Could not obtain ROS due to underlying mentation    Medications:    amLODIPine   Tablet 5 milliGRAM(s) Oral daily  hydrALAZINE Injectable 10 milliGRAM(s) IV Push every 4 hours PRN      acetaminophen    Suspension .. 650 milliGRAM(s) Oral every 6 hours PRN  fentaNYL    Injectable 50 MICROGram(s) IV Push every 2 hours PRN        pantoprazole   Suspension 40 milliGRAM(s) Oral daily            chlorhexidine 0.12% Liquid 15 milliLiter(s) Oral Mucosa two times a day        Mode: CPAP with PS  FiO2: 30  PEEP: 5  PS: 5  MAP: 8    ICU Vital Signs Last 24 Hrs  T(C): 38.9 (30 Oct 2018 12:00), Max: 38.9 (30 Oct 2018 12:00)  T(F): 102 (30 Oct 2018 12:00), Max: 102 (30 Oct 2018 12:00)  HR: 110 (30 Oct 2018 12:00) (98 - 120)  BP: 155/69 (30 Oct 2018 12:00) (122/64 - 186/82)  BP(mean): 99 (30 Oct 2018 12:00) (87 - 118)  ABP: --  ABP(mean): --  RR: 15 (30 Oct 2018 12:00) (13 - 21)  SpO2: 100% (30 Oct 2018 12:00) (100% - 100%)    I&O's Summary    29 Oct 2018 07:01  -  30 Oct 2018 07:00  --------------------------------------------------------  IN: 2375 mL / OUT: 700 mL / NET: 1675 mL    30 Oct 2018 07:01  -  30 Oct 2018 12:23  --------------------------------------------------------  IN: 750 mL / OUT: 0 mL / NET: 750 mL        CULTURES:  Culture Results:   >100,000 CFU/ml Klebsiella oxytoca (10-21-18 @ 16:13)      Physical Examination:    General: Intubated not sedated BS reflexs +    HEENT: No signs of trauma or acute injury. Pupils  sluggish b/l possible H/0 eye sx    PULM: Good equal BS decreased at bases     CVS: Regular rate and rhythm, no murmurs, rubs, or gallops    ABD: Soft, nondistended, nontender, normoactive bowel sounds, no masses    EXT: No edema, nontender    Neuro: Unresponsive to verbal stimuli, mild withdrawl to painful stimuli only, no spontaneous movements     CRITICAL CARE TIME SPENT: 35

## 2018-10-30 NOTE — PROGRESS NOTE ADULT - ASSESSMENT
89F with a large non-traumatic, spontaneous hemorrhagic CVA, with intraparenchymal and intraventricular hemorrhage. With uncontrolled HTN and respiratory failure.    1: Non-traumatic hemorrhagic CVA (IPH/IVH) complicated by ac encephalopathy  2: Acute hypoxic resp failure  3: A fib  4: DM-2      Neuro:   Remains comatose (BS reflexes +) off of sedative , gaurded prognossi  EEG:  burst-suppression no NCSE as per neuro  No neurosx intervention  S/p nicardipine drip  Poor prognosis confirmed by neurology  Palliative care involved and input appreciated: currently DNR  Palliative care team talked to patient's daughter: barry who is coming back with patient's Son Jimi today, who as per her was apologetic and coming around to make decision: Awaiting family meeting, with understanding that if there remains dispute between family members, we will need to involve ethics in goals of care discussion     Cardiovascular:   HD stable for now  C/w norvac/ PRN hydralazine      Resp/Chest:   Remains on full volume AC mode, could not perform SBT today  VAP ppx bundle    Gi/Nutrition:   Started Feeding  Diarrhea improved  Ruled out CDAD  PPI  Antidiarrheal ageant added     ID:   5 day course of IV CTX through this hospital course    Nephro/Electrolyte/Acid-Base:   Stable    Endocrinology:   Stable    Haem/Oncology:   SCD

## 2018-10-31 DIAGNOSIS — R13.10 DYSPHAGIA, UNSPECIFIED: ICD-10-CM

## 2018-10-31 PROCEDURE — 99232 SBSQ HOSP IP/OBS MODERATE 35: CPT

## 2018-10-31 PROCEDURE — 99233 SBSQ HOSP IP/OBS HIGH 50: CPT

## 2018-10-31 RX ADMIN — CHLORHEXIDINE GLUCONATE 15 MILLILITER(S): 213 SOLUTION TOPICAL at 17:09

## 2018-10-31 RX ADMIN — Medication 1 PACKET(S): at 12:44

## 2018-10-31 RX ADMIN — Medication 650 MILLIGRAM(S): at 18:56

## 2018-10-31 RX ADMIN — Medication 650 MILLIGRAM(S): at 05:31

## 2018-10-31 RX ADMIN — PANTOPRAZOLE SODIUM 40 MILLIGRAM(S): 20 TABLET, DELAYED RELEASE ORAL at 12:44

## 2018-10-31 RX ADMIN — Medication 650 MILLIGRAM(S): at 06:51

## 2018-10-31 RX ADMIN — AMLODIPINE BESYLATE 5 MILLIGRAM(S): 2.5 TABLET ORAL at 05:30

## 2018-10-31 RX ADMIN — CHLORHEXIDINE GLUCONATE 15 MILLILITER(S): 213 SOLUTION TOPICAL at 05:30

## 2018-10-31 NOTE — PROGRESS NOTE ADULT - ASSESSMENT
89 female with hx of dementia, HTN, admitted with massive intracerebral hemorrhage, encephalopathy, acute respiratory failure, now with poor mental status/ neuro exam.  Was on nicardipine for hypertensive emergency, now on oral agents. Still triggering vent, not brain dead.   Dispute among family members about what to do next.  Daughter Kristine wants withdrawal of life support, unsure when.  No escalation of care.

## 2018-10-31 NOTE — PROGRESS NOTE ADULT - PROBLEM SELECTOR PLAN 4
In my conversation yesterday  with Kristine, daughter,  Jack her brother ( though not the official HCP) had given her decisional making authority.  She states she has spoken to all her other siblings who are in agreement to withdraw the vent.    They wish to withdraw on Friday 11/2 as family from [place] are coming tomorrow night.  Jack does not want to be there, but she and other family will be present.   She will call me as to the time they will be here, likely in the morning.  Support.

## 2018-10-31 NOTE — PROGRESS NOTE ADULT - SUBJECTIVE AND OBJECTIVE BOX
CC: follow up GOC  INTERVAL HPI/OVERNIGHT EVENTS:    PRESENT SYMPTOMS: SOURCE:  Patient/Family/Team    PAIN SCALE:  0 = none  1 = mild   2 = moderate  3 = severe    Pain: no sign    Dyspnea:  [ ] YES [ ] NO  on vent  Anxiety:  [ ] YES [ x] NO   Fatigue: [ x] YES [ ] NO  Nausea: [ ] YES [ ] NO  na  Loss of Appetite: [ ] YES [ ] NO  na on TF  Other symptoms: __________    MEDICATIONS  (STANDING):  amLODIPine   Tablet 5 milliGRAM(s) Oral daily  chlorhexidine 0.12% Liquid 15 milliLiter(s) Oral Mucosa two times a day  pantoprazole   Suspension 40 milliGRAM(s) Oral daily  psyllium Powder 1 Packet(s) Oral daily  simvastatin 10 milliGRAM(s) Oral at bedtime    MEDICATIONS  (PRN):  acetaminophen    Suspension .. 650 milliGRAM(s) Oral every 6 hours PRN Temp greater or equal to 38C (100.4F)  hydrALAZINE Injectable 10 milliGRAM(s) IV Push every 4 hours PRN SBP > 170      Allergies    No Known Drug Allergies  Shellfish (Rash)    Intolerances        Karnofsky Performance Score/Palliative Performance Status Version 2:  10     %    Vital Signs Last 24 Hrs  T(C): 38 (31 Oct 2018 16:00), Max: 39.4 (30 Oct 2018 18:00)  T(F): 100.4 (31 Oct 2018 16:00), Max: 102.9 (30 Oct 2018 18:00)  HR: 92 (31 Oct 2018 16:33) (81 - 118)  BP: 160/70 (31 Oct 2018 16:00) (129/59 - 172/86)  BP(mean): 100 (31 Oct 2018 16:00) (85 - 118)  RR: 14 (31 Oct 2018 16:00) (14 - 19)  SpO2: 100% (31 Oct 2018 16:33) (100% - 100%)    PHYSICAL EXAM:    General:  non responsive    HEENT: [ x] normal  [ ] dry mouth  [x ] ET tube/trach    Lungs: [x ] comfortable- on vent    CV: [x ] normal  [ ] tachycardia    GI: [x ] normal  [ ] distended  [ ] tender  [ ] no BS               [ ] PEG/NG/OG tube    : [ ] normal  [ x] incontinent  [ ] oliguria/anuria  [ ] garcia    MSK: [ ] normal  [ x] weakness  [ ] edema             [ ] ambulatory  [ x] bedbound/wheelchair bound    Skin: [ ] normal  [ ] pressure ulcers- Stage_____  [x ] no rash    LABS:                        10.9   12.0  )-----------( 267      ( 30 Oct 2018 06:07 )             35.5     10-30    147<H>  |  109<H>  |  73.0<H>  ----------------------------<  225<H>  5.7<H>   |  26.0  |  1.19    Ca    8.9      30 Oct 2018 06:07  Phos  2.1     10-30  Mg     2.9     10-30          I&O's Summary    30 Oct 2018 07:01  -  31 Oct 2018 07:00  --------------------------------------------------------  IN: 2780 mL / OUT: 1650 mL / NET: 1130 mL    31 Oct 2018 07:01  -  31 Oct 2018 17:20  --------------------------------------------------------  IN: 750 mL / OUT: 0 mL / NET: 750 mL      Thank you for the opportunity to assist with the care of this patient.   [facility] Consult [facility] [phone].

## 2018-10-31 NOTE — PROGRESS NOTE ADULT - SUBJECTIVE AND OBJECTIVE BOX
INTERVAL HPI/OVERNIGHT EVENTS: No new events      Antimicrobial:    Cardiovascular:  amLODIPine   Tablet 5 milliGRAM(s) Oral daily  hydrALAZINE Injectable 10 milliGRAM(s) IV Push every 4 hours PRN    Pulmonary:    Hematalogic:    Other:  acetaminophen    Suspension .. 650 milliGRAM(s) Oral every 6 hours PRN  chlorhexidine 0.12% Liquid 15 milliLiter(s) Oral Mucosa two times a day  pantoprazole   Suspension 40 milliGRAM(s) Oral daily  psyllium Powder 1 Packet(s) Oral daily  simvastatin 10 milliGRAM(s) Oral at bedtime      Drug Dosing Weight  Height (cm): 149.86 (21 Oct 2018 16:25)  Weight (kg): 43.9 (21 Oct 2018 16:25)  BMI (kg/m2): 19.5 (21 Oct 2018 16:25)  BSA (m2): 1.35 (21 Oct 2018 16:25)      PMH/Social Hx/Fam Hx -reviewed admission note, no change since admission  PAST MEDICAL & SURGICAL HISTORY:  Oral phase dysphagia  Fall, initial encounter  SDH (subdural hematoma)  CVA (cerebral vascular accident)  Diabetes  Atrial fibrillation  High cholesterol  Hypertension  Delivered by  section  S/P tonsillectomy      T(C): 37.7 (10-31-18 @ 11:19), Max: 39.4 (10-30-18 @ 18:00)  HR: 97 (10-31-18 @ 14:00)  BP: 146/66 (10-31-18 @ 14:00)  BP(mean): 95 (10-31-18 @ 14:00)  ABP: --  ABP(mean): --  RR: 14 (10-31-18 @ 14:00)  SpO2: 100% (10-31-18 @ 14:00)  Wt(kg): --          10-30 @ 07:01  -  10-31 @ 07:00  --------------------------------------------------------  IN: 2780 mL / OUT: 1650 mL / NET: 1130 mL        Mode: AC/ CMV (Assist Control/ Continuous Mandatory Ventilation)  RR (machine): 14  TV (machine): 360  FiO2: 30  PEEP: 5  MAP: 9  PIP: 25      PHYSICAL EXAM:    GENERAL: [x ]NAD, [ ]well-groomed, [ ]well-developed;  [ ]  HEAD:  [x ]Atraumatic, [ ]Normocephalic;  [ ]  EYES: [ ]EOMI, [ ]PERRLA, [ x]conjunctiva and sclera clear;   [ ]  ENMT: [x ]No tonsillar erythema, exudates, or enlargement; [ ]Moist mucous membranes, [ ]Good dentition, [ ]No lesions; [ ]  NECK: [ x]Supple, normal appearance, [ ]No JVD; [ ]Normal thyroid; [ ]Trachea midline; [ ]  NERVOUS SYSTEM:  [ ]Alert & Oriented X3, [ ]Good concentration; [ ]Motor Strength 5/5 B/L upper and lower extremities; [ ]DTRs 2+ intact and symmetric; [ x] poor mental status, unresponsive/coma, still triggering vent  CHEST/LUNG: [x ]No chest deformity; [ ]Normal percussion bilaterally; [ ]No rales, rhonchi, wheezing; [ ]Crackles at bases; [ ]  HEART: [x ]Regular rate and rhythm; [ ]No murmurs, rubs, or gallops;  [ ]  ABDOMEN: [x ]Soft, Nontender, Nondistended; [ ]Bowel sounds present;  [ ]  EXTREMITIES:  [x ]2+ Peripheral Pulses, [ ]No clubbing, cyanosis, or edema [ ]Bilat lower extremity edema;  [ ]  LYMPH: [ x]No lymphadenopathy noted;  [ ]  SKIN: [ x]No rashes or lesions; [ x]Good capillary refill;  [ ]      LABS:  CBC Full  -  ( 30 Oct 2018 06:07 )  WBC Count : 12.0 K/uL  Hemoglobin : 10.9 g/dL  Hematocrit : 35.5 %  Platelet Count - Automated : 267 K/uL  Mean Cell Volume : 87.4 fl  Mean Cell Hemoglobin : 26.8 pg  Mean Cell Hemoglobin Concentration : 30.7 g/dL  Auto Neutrophil # : x  Auto Lymphocyte # : x  Auto Monocyte # : x  Auto Eosinophil # : x  Auto Basophil # : x  Auto Neutrophil % : x  Auto Lymphocyte % : x  Auto Monocyte % : x  Auto Eosinophil % : x  Auto Basophil % : x    10-30    147<H>  |  109<H>  |  73.0<H>  ----------------------------<  225<H>  5.7<H>   |  26.0  |  1.19    Ca    8.9      30 Oct 2018 06:07  Phos  2.1     10-30  Mg     2.9     1030

## 2018-11-01 PROCEDURE — 99233 SBSQ HOSP IP/OBS HIGH 50: CPT

## 2018-11-01 PROCEDURE — 99232 SBSQ HOSP IP/OBS MODERATE 35: CPT

## 2018-11-01 RX ADMIN — SIMVASTATIN 10 MILLIGRAM(S): 20 TABLET, FILM COATED ORAL at 00:37

## 2018-11-01 RX ADMIN — Medication 1 PACKET(S): at 11:23

## 2018-11-01 RX ADMIN — Medication 650 MILLIGRAM(S): at 21:07

## 2018-11-01 RX ADMIN — CHLORHEXIDINE GLUCONATE 15 MILLILITER(S): 213 SOLUTION TOPICAL at 18:43

## 2018-11-01 RX ADMIN — Medication 650 MILLIGRAM(S): at 05:30

## 2018-11-01 RX ADMIN — PANTOPRAZOLE SODIUM 40 MILLIGRAM(S): 20 TABLET, DELAYED RELEASE ORAL at 11:23

## 2018-11-01 RX ADMIN — SIMVASTATIN 10 MILLIGRAM(S): 20 TABLET, FILM COATED ORAL at 21:07

## 2018-11-01 RX ADMIN — CHLORHEXIDINE GLUCONATE 15 MILLILITER(S): 213 SOLUTION TOPICAL at 05:05

## 2018-11-01 RX ADMIN — Medication 650 MILLIGRAM(S): at 21:43

## 2018-11-01 RX ADMIN — AMLODIPINE BESYLATE 5 MILLIGRAM(S): 2.5 TABLET ORAL at 05:05

## 2018-11-01 NOTE — PROGRESS NOTE ADULT - SUBJECTIVE AND OBJECTIVE BOX
INTERVAL HPI/OVERNIGHT EVENTS: no events, family planning terminal extubation on friday.      Antimicrobial:    Cardiovascular:  amLODIPine   Tablet 5 milliGRAM(s) Oral daily  hydrALAZINE Injectable 10 milliGRAM(s) IV Push every 4 hours PRN    Pulmonary:    Hematalogic:    Other:  acetaminophen    Suspension .. 650 milliGRAM(s) Oral every 6 hours PRN  chlorhexidine 0.12% Liquid 15 milliLiter(s) Oral Mucosa two times a day  pantoprazole   Suspension 40 milliGRAM(s) Oral daily  psyllium Powder 1 Packet(s) Oral daily  simvastatin 10 milliGRAM(s) Oral at bedtime      Drug Dosing Weight  Height (cm): 149.86 (21 Oct 2018 16:25)  Weight (kg): 43.9 (21 Oct 2018 16:25)  BMI (kg/m2): 19.5 (21 Oct 2018 16:25)  BSA (m2): 1.35 (21 Oct 2018 16:25)      PMH/Social Hx/Fam Hx -reviewed admission note, no change since admission  PAST MEDICAL & SURGICAL HISTORY:  Oral phase dysphagia  Fall, initial encounter  SDH (subdural hematoma)  CVA (cerebral vascular accident)  Diabetes  Atrial fibrillation  High cholesterol  Hypertension  Delivered by  section  S/P tonsillectomy      T(C): 37.2 (18 @ 11:00), Max: 38.1 (18 @ 05:00)  HR: 87 (18 @ 13:18)  BP: 146/65 (18 @ 13:00)  BP(mean): 94 (18 @ 13:00)  ABP: --  ABP(mean): --  RR: 14 (18 @ 13:00)  SpO2: 100% (18 @ 13:18)  Wt(kg): --          10-31 @ 07:01  -   @ 07:00  --------------------------------------------------------  IN: 2600 mL / OUT: 690 mL / NET: 1910 mL        Mode: AC/ CMV (Assist Control/ Continuous Mandatory Ventilation)  RR (machine): 14  TV (machine): 360  FiO2: 30  PEEP: 5  MAP: 8  PIP: 25      PHYSICAL EXAM:  intubated and unresponsive  NAD  reg rhythm  equal air entry  trace edema      LABS:

## 2018-11-01 NOTE — PROGRESS NOTE ADULT - ASSESSMENT
89 female with hx of dementia, HTN, admitted with massive intracerebral hemorrhage, encephalopathy, acute respiratory failure, now with poor mental status/ neuro exam.  Was on nicardipine for hypertensive emergency, now on oral agents. Still triggering vent, not brain dead.   plan for terminal extubation on friday 11/2/18  for now dnr, no escalation ,no labs

## 2018-11-01 NOTE — PROGRESS NOTE ADULT - PROBLEM SELECTOR PLAN 3
Spoke to daughter, Kristine. She states family has decided to withdraw mother from vent tomorrow at 1pm.  Jack her brother has given her full decision making capacity.  Informed Kristine will give mother medications, opioid, to help with dyspnea so she can die without suffering.  Prognosis at that point,  minutes/ hours.   Called Jack - he confirms that he allows Kristine, sister to make decisions regarding his mother's care. He is aware that plans are being made to remove mother from vent.

## 2018-11-01 NOTE — PROGRESS NOTE ADULT - SUBJECTIVE AND OBJECTIVE BOX
CC: follow up GOC  INTERVAL HPI/OVERNIGHT EVENTS:    PRESENT SYMPTOMS: SOURCE:  Patient/Family/Team    PAIN SCALE:  0 = none  1 = mild   2 = moderate  3 = severe    Pain: no sign    Dyspnea:  [ ] YES [ ] NO  on full vent support  Anxiety:  [ ] YES [ ] NO  na  Fatigue: [ ] YES [ ] NO  na  Nausea: [ ] YES [ ] NO  na  Loss of Appetite: [ ] YES [ ] NO  on TF  Other symptoms: __________      MEDICATIONS  (STANDING):  amLODIPine   Tablet 5 milliGRAM(s) Oral daily  chlorhexidine 0.12% Liquid 15 milliLiter(s) Oral Mucosa two times a day  pantoprazole   Suspension 40 milliGRAM(s) Oral daily  psyllium Powder 1 Packet(s) Oral daily  simvastatin 10 milliGRAM(s) Oral at bedtime    MEDICATIONS  (PRN):  acetaminophen    Suspension .. 650 milliGRAM(s) Oral every 6 hours PRN Temp greater or equal to 38C (100.4F)  hydrALAZINE Injectable 10 milliGRAM(s) IV Push every 4 hours PRN SBP > 170      Allergies    No Known Drug Allergies  Shellfish (Rash)    Intolerances    Karnofsky Performance Score/Palliative Performance Status Version 2:  10   %    Vital Signs Last 24 Hrs  T(C): 37.2 (01 Nov 2018 11:00), Max: 38.1 (01 Nov 2018 05:00)  T(F): 99 (01 Nov 2018 11:00), Max: 100.6 (01 Nov 2018 05:00)  HR: 91 (01 Nov 2018 15:00) (83 - 102)  BP: 167/74 (01 Nov 2018 15:00) (121/58 - 167/74)  BP(mean): 106 (01 Nov 2018 15:00) (83 - 106)  RR: 14 (01 Nov 2018 15:00) (14 - 31)  SpO2: 100% (01 Nov 2018 15:00) (99% - 100%)    PHYSICAL EXAM:    General: non responsive    HEENT: [x ] normal  [ ] dry mouth  [x ] ET tube  Lungs: [ x] comfortable  on vent support  CV: [x ] normal  [ ] tachycardia    GI: [x ] normal  [ ] distended  [ ] tender  [ ] no BS               [ ] PEG/NG/OG tube    : [ ] normal  [x ] incontinent  [ ] oliguria/anuria  [ ] garcia    MSK: [ ] normal  [ ] weakness  [ ] edema             [ ] ambulatory  [x ] bedbound/wheelchair bound    Skin: [ ] normal  [ ] pressure ulcers- Stage_____  [x ] no rash      I&O's Summary    31 Oct 2018 07:01  -  01 Nov 2018 07:00  --------------------------------------------------------  IN: 2600 mL / OUT: 690 mL / NET: 1910 mL    01 Nov 2018 07:01  -  01 Nov 2018 16:45  --------------------------------------------------------  IN: 800 mL / OUT: 300 mL / NET: 500 mL      Thank you for the opportunity to assist with the care of this patient.   [facility] Consult [facility] [phone].

## 2018-11-02 DIAGNOSIS — R06.00 DYSPNEA, UNSPECIFIED: ICD-10-CM

## 2018-11-02 LAB
ANION GAP SERPL CALC-SCNC: 11 MMOL/L — SIGNIFICANT CHANGE UP (ref 5–17)
BUN SERPL-MCNC: 85 MG/DL — HIGH (ref 8–20)
CALCIUM SERPL-MCNC: 8.6 MG/DL — SIGNIFICANT CHANGE UP (ref 8.6–10.2)
CHLORIDE SERPL-SCNC: 107 MMOL/L — SIGNIFICANT CHANGE UP (ref 98–107)
CO2 SERPL-SCNC: 29 MMOL/L — SIGNIFICANT CHANGE UP (ref 22–29)
CREAT SERPL-MCNC: 1.18 MG/DL — SIGNIFICANT CHANGE UP (ref 0.5–1.3)
GLUCOSE SERPL-MCNC: 187 MG/DL — HIGH (ref 70–115)
HCT VFR BLD CALC: 31.2 % — LOW (ref 37–47)
HGB BLD-MCNC: 9.9 G/DL — LOW (ref 12–16)
MAGNESIUM SERPL-MCNC: 3.1 MG/DL — HIGH (ref 1.6–2.6)
MCHC RBC-ENTMCNC: 27.8 PG — SIGNIFICANT CHANGE UP (ref 27–31)
MCHC RBC-ENTMCNC: 31.7 G/DL — LOW (ref 32–36)
MCV RBC AUTO: 87.6 FL — SIGNIFICANT CHANGE UP (ref 81–99)
PHOSPHATE SERPL-MCNC: 3.8 MG/DL — SIGNIFICANT CHANGE UP (ref 2.4–4.7)
PLATELET # BLD AUTO: 284 K/UL — SIGNIFICANT CHANGE UP (ref 150–400)
POTASSIUM SERPL-MCNC: 5.4 MMOL/L — HIGH (ref 3.5–5.3)
POTASSIUM SERPL-SCNC: 5.4 MMOL/L — HIGH (ref 3.5–5.3)
RBC # BLD: 3.56 M/UL — LOW (ref 4.4–5.2)
RBC # FLD: 15.7 % — HIGH (ref 11–15.6)
SODIUM SERPL-SCNC: 147 MMOL/L — HIGH (ref 135–145)
WBC # BLD: 12.3 K/UL — HIGH (ref 4.8–10.8)
WBC # FLD AUTO: 12.3 K/UL — HIGH (ref 4.8–10.8)

## 2018-11-02 PROCEDURE — 81001 URINALYSIS AUTO W/SCOPE: CPT

## 2018-11-02 PROCEDURE — 93005 ELECTROCARDIOGRAM TRACING: CPT

## 2018-11-02 PROCEDURE — 99291 CRITICAL CARE FIRST HOUR: CPT | Mod: 25

## 2018-11-02 PROCEDURE — 84100 ASSAY OF PHOSPHORUS: CPT

## 2018-11-02 PROCEDURE — 85730 THROMBOPLASTIN TIME PARTIAL: CPT

## 2018-11-02 PROCEDURE — 31500 INSERT EMERGENCY AIRWAY: CPT

## 2018-11-02 PROCEDURE — 84132 ASSAY OF SERUM POTASSIUM: CPT

## 2018-11-02 PROCEDURE — 43753 TX GASTRO INTUB W/ASP: CPT

## 2018-11-02 PROCEDURE — 99233 SBSQ HOSP IP/OBS HIGH 50: CPT

## 2018-11-02 PROCEDURE — 99232 SBSQ HOSP IP/OBS MODERATE 35: CPT

## 2018-11-02 PROCEDURE — 83605 ASSAY OF LACTIC ACID: CPT

## 2018-11-02 PROCEDURE — 85014 HEMATOCRIT: CPT

## 2018-11-02 PROCEDURE — 85027 COMPLETE CBC AUTOMATED: CPT

## 2018-11-02 PROCEDURE — 80053 COMPREHEN METABOLIC PANEL: CPT

## 2018-11-02 PROCEDURE — 71045 X-RAY EXAM CHEST 1 VIEW: CPT

## 2018-11-02 PROCEDURE — 94770: CPT

## 2018-11-02 PROCEDURE — 96375 TX/PRO/DX INJ NEW DRUG ADDON: CPT | Mod: XU

## 2018-11-02 PROCEDURE — 85610 PROTHROMBIN TIME: CPT

## 2018-11-02 PROCEDURE — 82330 ASSAY OF CALCIUM: CPT

## 2018-11-02 PROCEDURE — 82435 ASSAY OF BLOOD CHLORIDE: CPT

## 2018-11-02 PROCEDURE — 83735 ASSAY OF MAGNESIUM: CPT

## 2018-11-02 PROCEDURE — 36600 WITHDRAWAL OF ARTERIAL BLOOD: CPT

## 2018-11-02 PROCEDURE — 94002 VENT MGMT INPAT INIT DAY: CPT

## 2018-11-02 PROCEDURE — 70450 CT HEAD/BRAIN W/O DYE: CPT

## 2018-11-02 PROCEDURE — 87186 SC STD MICRODIL/AGAR DIL: CPT

## 2018-11-02 PROCEDURE — 86850 RBC ANTIBODY SCREEN: CPT

## 2018-11-02 PROCEDURE — 86900 BLOOD TYPING SEROLOGIC ABO: CPT

## 2018-11-02 PROCEDURE — 84295 ASSAY OF SERUM SODIUM: CPT

## 2018-11-02 PROCEDURE — 82803 BLOOD GASES ANY COMBINATION: CPT

## 2018-11-02 PROCEDURE — 86901 BLOOD TYPING SEROLOGIC RH(D): CPT

## 2018-11-02 PROCEDURE — 82947 ASSAY GLUCOSE BLOOD QUANT: CPT

## 2018-11-02 PROCEDURE — 96374 THER/PROPH/DIAG INJ IV PUSH: CPT | Mod: XU

## 2018-11-02 PROCEDURE — 36415 COLL VENOUS BLD VENIPUNCTURE: CPT

## 2018-11-02 PROCEDURE — 94003 VENT MGMT INPAT SUBQ DAY: CPT

## 2018-11-02 PROCEDURE — 87493 C DIFF AMPLIFIED PROBE: CPT

## 2018-11-02 PROCEDURE — 80048 BASIC METABOLIC PNL TOTAL CA: CPT

## 2018-11-02 PROCEDURE — 95819 EEG AWAKE AND ASLEEP: CPT

## 2018-11-02 PROCEDURE — 87086 URINE CULTURE/COLONY COUNT: CPT

## 2018-11-02 RX ORDER — MORPHINE SULFATE 50 MG/1
6 CAPSULE, EXTENDED RELEASE ORAL EVERY 6 HOURS
Qty: 0 | Refills: 0 | Status: DISCONTINUED | OUTPATIENT
Start: 2018-11-02 | End: 2018-11-02

## 2018-11-02 RX ORDER — SCOPALAMINE 1 MG/3D
1 PATCH, EXTENDED RELEASE TRANSDERMAL ONCE
Qty: 0 | Refills: 0 | Status: COMPLETED | OUTPATIENT
Start: 2018-11-02 | End: 2018-11-02

## 2018-11-02 RX ORDER — MORPHINE SULFATE 50 MG/1
4 CAPSULE, EXTENDED RELEASE ORAL
Qty: 100 | Refills: 0 | Status: DISCONTINUED | OUTPATIENT
Start: 2018-11-02 | End: 2018-11-03

## 2018-11-02 RX ORDER — ROBINUL 0.2 MG/ML
0.2 INJECTION INTRAMUSCULAR; INTRAVENOUS EVERY 4 HOURS
Qty: 0 | Refills: 0 | Status: DISCONTINUED | OUTPATIENT
Start: 2018-11-02 | End: 2018-11-03

## 2018-11-02 RX ORDER — MORPHINE SULFATE 50 MG/1
4 CAPSULE, EXTENDED RELEASE ORAL ONCE
Qty: 0 | Refills: 0 | Status: DISCONTINUED | OUTPATIENT
Start: 2018-11-02 | End: 2018-11-02

## 2018-11-02 RX ORDER — MORPHINE SULFATE 50 MG/1
4 CAPSULE, EXTENDED RELEASE ORAL
Qty: 0 | Refills: 0 | Status: DISCONTINUED | OUTPATIENT
Start: 2018-11-02 | End: 2018-11-03

## 2018-11-02 RX ORDER — ROBINUL 0.2 MG/ML
0.1 INJECTION INTRAMUSCULAR; INTRAVENOUS EVERY 8 HOURS
Qty: 0 | Refills: 0 | Status: DISCONTINUED | OUTPATIENT
Start: 2018-11-02 | End: 2018-11-02

## 2018-11-02 RX ADMIN — MORPHINE SULFATE 4 MILLIGRAM(S): 50 CAPSULE, EXTENDED RELEASE ORAL at 14:48

## 2018-11-02 RX ADMIN — MORPHINE SULFATE 4 MG/HR: 50 CAPSULE, EXTENDED RELEASE ORAL at 12:51

## 2018-11-02 RX ADMIN — MORPHINE SULFATE 4 MILLIGRAM(S): 50 CAPSULE, EXTENDED RELEASE ORAL at 14:30

## 2018-11-02 RX ADMIN — AMLODIPINE BESYLATE 5 MILLIGRAM(S): 2.5 TABLET ORAL at 05:12

## 2018-11-02 RX ADMIN — Medication 2 MILLIGRAM(S): at 17:59

## 2018-11-02 RX ADMIN — MORPHINE SULFATE 4 MG/HR: 50 CAPSULE, EXTENDED RELEASE ORAL at 12:52

## 2018-11-02 RX ADMIN — ROBINUL 0.2 MILLIGRAM(S): 0.2 INJECTION INTRAMUSCULAR; INTRAVENOUS at 12:16

## 2018-11-02 RX ADMIN — MORPHINE SULFATE 4 MILLIGRAM(S): 50 CAPSULE, EXTENDED RELEASE ORAL at 13:52

## 2018-11-02 RX ADMIN — SCOPALAMINE 1 PATCH: 1 PATCH, EXTENDED RELEASE TRANSDERMAL at 12:52

## 2018-11-02 RX ADMIN — MORPHINE SULFATE 4 MG/HR: 50 CAPSULE, EXTENDED RELEASE ORAL at 13:22

## 2018-11-02 RX ADMIN — Medication 2 MILLIGRAM(S): at 14:27

## 2018-11-02 RX ADMIN — CHLORHEXIDINE GLUCONATE 15 MILLILITER(S): 213 SOLUTION TOPICAL at 05:12

## 2018-11-02 RX ADMIN — Medication 2 MILLIGRAM(S): at 16:28

## 2018-11-02 RX ADMIN — SCOPALAMINE 1 PATCH: 1 PATCH, EXTENDED RELEASE TRANSDERMAL at 21:43

## 2018-11-02 RX ADMIN — Medication 2 MILLIGRAM(S): at 21:41

## 2018-11-02 RX ADMIN — MORPHINE SULFATE 4 MILLIGRAM(S): 50 CAPSULE, EXTENDED RELEASE ORAL at 13:36

## 2018-11-02 RX ADMIN — Medication 2 MILLIGRAM(S): at 12:16

## 2018-11-02 RX ADMIN — MORPHINE SULFATE 4 MG/HR: 50 CAPSULE, EXTENDED RELEASE ORAL at 13:28

## 2018-11-02 NOTE — PROGRESS NOTE ADULT - PROVIDER SPECIALTY LIST ADULT
Critical Care
Neurology
Palliative Care
Critical Care
Palliative Care
Palliative Care

## 2018-11-02 NOTE — PROGRESS NOTE ADULT - ASSESSMENT
89 female with hx of dementia, HTN, admitted with massive intracerebral hemorrhage, encephalopathy, acute respiratory failure, now with poor mental status/ neuro exam.    Plan for terminal extubation today, started on morphine and ativan.

## 2018-11-02 NOTE — PROGRESS NOTE ADULT - SUBJECTIVE AND OBJECTIVE BOX
CC: follow up GOC  INTERVAL HPI/OVERNIGHT EVENTS:  family wishes liberation from vent  PRESENT SYMPTOMS: SOURCE:  Patient/Family/Team    PAIN SCALE:  0 = none  1 = mild   2 = moderate  3 = severe    Pain: no sign    Dyspnea:  [ ] YES [ ] NO on vent  Anxiety:  [ ] YES [ ] NO  na  Fatigue: [ ] YES [ ] NO    na  Nausea: [ ] YES [ ] NO  na  Loss of Appetite: [ ] YES [ ] NO  na  Other symptoms: __________    MEDICATIONS  (STANDING):  LORazepam   Injectable 2 milliGRAM(s) IV Push once  LORazepam   Injectable 2 milliGRAM(s) IV Push every 4 hours  morphine  - Injectable 4 milliGRAM(s) IV Push once  morphine  Infusion 4 mG/Hr (4 mL/Hr) IV Continuous <Continuous>    MEDICATIONS  (PRN):  acetaminophen    Suspension .. 650 milliGRAM(s) Oral every 6 hours PRN Temp greater or equal to 38C (100.4F)  glycopyrrolate Injectable 0.2 milliGRAM(s) IV Push every 4 hours PRN secretions  LORazepam   Injectable 2 milliGRAM(s) IV Push every 2 hours PRN Agitation  morphine  - Injectable 4 milliGRAM(s) IV Push every 2 hours PRN dyspnea or pain      Allergies    No Known Drug Allergies  Shellfish (Rash)    Intolerances      Karnofsky Performance Score/Palliative Performance Status Version 2:  10 %    Vital Signs Last 24 Hrs  T(C): 37.9 (02 Nov 2018 12:50), Max: 38.1 (01 Nov 2018 20:00)  T(F): 100.2 (02 Nov 2018 12:50), Max: 100.6 (01 Nov 2018 20:00)  HR: 123 (02 Nov 2018 13:20) (83 - 123)  BP: 123/68 (02 Nov 2018 13:00) (123/68 - 176/77)  BP(mean): 89 (02 Nov 2018 13:00) (83 - 111)  RR: 14 (02 Nov 2018 13:00) (14 - 22)  SpO2: 98% (02 Nov 2018 13:00) (98% - 100%)    PHYSICAL EXAM:    General: Non responsive     HEENT: [ x] normal  [ ] dry mouth  [x ] ET tube/trach    Lungs: [x ] comfortable  on vent    CV: [x ] normal  [ ] tachycardia    GI: soft NT  : [ ] normal  [ x] incontinent  [ ] oliguria/anuria  [ ] jose    MSK: [ ] normal  [ ] weakness  [ ] edema             [ ] ambulatory  [ x] bedbound/wheelchair bound    Skin: [ ] normal  [ ] pressure ulcers- Stage_____  [ x] no rash    LABS:                        9.9    12.3  )-----------( 284      ( 02 Nov 2018 04:42 )             31.2     11-02    147<H>  |  107  |  85.0<H>  ----------------------------<  187<H>  5.4<H>   |  29.0  |  1.18    Ca    8.6      02 Nov 2018 04:42  Phos  3.8     11-02  Mg     3.1     11-02          I&O's Summary    01 Nov 2018 07:01  -  02 Nov 2018 07:00  --------------------------------------------------------  IN: 2600 mL / OUT: 825 mL / NET: 1775 mL    02 Nov 2018 07:01  -  02 Nov 2018 14:22  --------------------------------------------------------  IN: 204 mL / OUT: 0 mL / NET: 204 mL      Thank you for the opportunity to assist with the care of this patient.   [facility] Consult [facility] [phone].

## 2018-11-02 NOTE — CHART NOTE - NSCHARTNOTEFT_GEN_A_CORE
Came to check on patient and family.  Son, Jack at bedside. Sister Kristine states Jack questioning morphine saying it is Euthanasia. Full care plan was given to Kristine before vent removal.   I spoke to Jack and informed him that morphine is to help with dyspnea so mother does not suffer when she is off the vent, and can die peacefully. He appeared disturbed and re questioned it, for which I re explained.  I asked him  " would you want your mother to suffer? " and he became very angry with me, stating " what kind of question is that! " . I apologized and told him it was not in any way in any offense.  I tried to re explain again, that medication is used to help with symptoms so patient can pass without suffering,  and in no way to aid in dying process.

## 2018-11-02 NOTE — PROGRESS NOTE ADULT - SUBJECTIVE AND OBJECTIVE BOX
INTERVAL HPI/OVERNIGHT EVENTS: No events.       Antimicrobial:    Cardiovascular:    Pulmonary:    Hematalogic:    Other:  acetaminophen    Suspension .. 650 milliGRAM(s) Oral every 6 hours PRN  glycopyrrolate Injectable 0.2 milliGRAM(s) IV Push every 4 hours PRN  LORazepam   Injectable 2 milliGRAM(s) IV Push every 6 hours  morphine  - Injectable 4 milliGRAM(s) IV Push every 2 hours PRN  morphine  Infusion 4 mG/Hr IV Continuous <Continuous>      Drug Dosing Weight  Height (cm): 149.86 (21 Oct 2018 16:25)  Weight (kg): 43.9 (21 Oct 2018 16:25)  BMI (kg/m2): 19.5 (21 Oct 2018 16:25)  BSA (m2): 1.35 (21 Oct 2018 16:25)    PMH/Social Hx/Fam Hx -reviewed admission note, no change since admission  PAST MEDICAL & SURGICAL HISTORY:  Oral phase dysphagia  Fall, initial encounter  SDH (subdural hematoma)  CVA (cerebral vascular accident)  Diabetes  Atrial fibrillation  High cholesterol  Hypertension  Delivered by  section  S/P tonsillectomy      T(C): 37.9 (18 @ 12:50), Max: 38.1 (18 @ 20:00)  HR: 123 (18 @ 13:20)  BP: 123/68 (18 @ 13:00)  BP(mean): 89 (18 @ 13:00)  ABP: --  ABP(mean): --  RR: 14 (18 @ 13:00)  SpO2: 98% (18 @ 13:00)  Wt(kg): --           @ 07:01  -   @ 07:00  --------------------------------------------------------  IN: 2600 mL / OUT: 825 mL / NET: 1775 mL        Mode: AC/ CMV (Assist Control/ Continuous Mandatory Ventilation)  RR (machine): 14  TV (machine): 360  FiO2: 30  PEEP: 5  MAP: 8  PIP: 27      PHYSICAL EXAM:    Intubated and unresponsive  copious secretions from mouth  triggers vent  triple reflex in lower extremity       LABS:  CBC Full  -  ( 2018 04:42 )  WBC Count : 12.3 K/uL  Hemoglobin : 9.9 g/dL  Hematocrit : 31.2 %  Platelet Count - Automated : 284 K/uL  Mean Cell Volume : 87.6 fl  Mean Cell Hemoglobin : 27.8 pg  Mean Cell Hemoglobin Concentration : 31.7 g/dL  Auto Neutrophil # : x  Auto Lymphocyte # : x  Auto Monocyte # : x  Auto Eosinophil # : x  Auto Basophil # : x  Auto Neutrophil % : x  Auto Lymphocyte % : x  Auto Monocyte % : x  Auto Eosinophil % : x  Auto Basophil % : x        147<H>  |  107  |  85.0<H>  ----------------------------<  187<H>  5.4<H>   |  29.0  |  1.18    Ca    8.6      2018 04:42  Phos  3.8       Mg     3.1

## 2018-11-02 NOTE — PROGRESS NOTE ADULT - PROBLEM SELECTOR PLAN 4
See separate Goals of Care note.   Family electing vent withdrawal. Family's  present.  Family informed prognosis after withdrawal likely minutes to hours.  Medications ordered for comfort.  Psychosocial support.

## 2018-11-02 NOTE — GOALS OF CARE CONVERSATION - PERSONAL ADVANCE DIRECTIVE - CONVERSATION DETAILS
Family has elected elective withdrawal from vent.  Spoke to daughter Kristine - her brother Jack aware and does not want to be present. I did speak to Jack yesterday who is aware of withdrawal.   Made family aware of prognosis after withdrawal likely minutes to hours  Family  present
Phone call to son Jack to review Plan of Care.  Son verbalized that he was at hospital last nite and would not be able to come earlier in the day.  Jack stated he would call his sister to determine if she would be able to meet with Palliative Care tomorrow to review care of Pt.  Son will tell the nsg staff tonite if a mtg can be arranged for tomorrow.  Palliative will follow as needed.

## 2018-11-02 NOTE — PROGRESS NOTE ADULT - PROBLEM SELECTOR PLAN 3
Morphine infusion to support dyspnea from vent.  Robinul 0.2 mg IVP q4hr PRN  and Scopolamine for secretions  Ativan 2mg IVP for agitation/anxiety.

## 2018-11-02 NOTE — PROGRESS NOTE ADULT - REASON FOR ADMISSION
ICH
n
cerebral hemorrhage
ICH
cerebral hemorrhage
ICH
IVH
IVH - Goals of Care
ICH
Goals of Care
ICH
IVH
IVH
Hemorrhagic CVA
Hemorrhagic CVA

## 2018-11-03 VITALS — TEMPERATURE: 98 F

## 2018-11-03 NOTE — DISCHARGE NOTE FOR THE EXPIRED PATIENT - HOSPITAL COURSE
89F with HTN, dementia, was BIBEMS found  unarousable and with agonal breathing in her bed. She was intubated for airway protection, was unresponsive to voice or command and only withdrew to painful stimuli. She was hypertensive on initial presentation with systolic blood pressure 240.  A STAT head Showed diffuse extensive intra-parenchymal and intraventricular with leftward shift and deviation. She was admitted to ICU with encephalopathy, acute respiratory failure, poor mental status/ neuro exam and over course of past days family decided DNR status and plan for terminal extubation today, started on morphine and ativan    I was called to patients bedside and examined the patient and found asystole on monitor, no palpable pulses at carotid and femoral locations, the ventilator was disconnected, no spontaneous breath sounds were auscultated via stethoscope. There were no heart sounds auscultated via stethoscope at left and right sternal boarders as well at PMI, skin was cyanotic and cool and pupils were fixed and dilated without reaction to light and she was pronounced dead 01:10. The son Jack was notified at 01:33 via telephone.

## 2019-09-18 NOTE — ED ADULT NURSE NOTE - TEMPLATE LIST FOR HEAD TO TOE ASSESSMENT
This occurred in 2011 and was characterized by dysarthria and mild cognitive impairment.  There is been no recurrence patient is quite functional.   Respiratory

## 2019-09-27 NOTE — GOALS OF CARE CONVERSATION - PERSONAL ADVANCE DIRECTIVE - CONVERSATION/DISCUSSION
Diagnosis/Prognosis
Palliative Care Referral
Patient/Caregiver provided printed discharge information.

## 2019-11-04 NOTE — PATIENT PROFILE ADULT. - FUNCTIONAL SCREEN CURRENT LEVEL: COMMUNICATION, MLM
LA NENA SPANGLER ; 11/15/2019 ; [facility] LA NENA SPANGLER ; 11/15/2019 ; N[facility] (2) difficulty understanding and speaking (not related to language barrier)

## 2021-01-08 NOTE — ED PROVIDER NOTE - NS ED MD DISPO ADMITTING SERVICE
Patient's primary care provider had specified that she should be following with these disciplines for ongoing refills of her medications. I reviewed neuro note from 10/21/20. Sounds like patient was unhappy with [facility] pain team and neurologist placed a referral to a different pain team for a second opinion. She needs to schedule with them, or with our pain team. Neuro wanted to follow up in 4 months. She should schedule this. Also needs to schedule with psychiatry and therapy.   Please advise she needs to schedule these. Then check with patient in 1 week to see if they were done. If not done, schedule virtual visit to discuss wean plan   MED

## 2021-11-07 NOTE — DISCHARGE NOTE ADULT - SECONDARY DIAGNOSIS.
Type 2 diabetes mellitus without complication, without long-term current use of insulin Atrial fibrillation Seizure disorder

## 2022-10-24 NOTE — ED PROVIDER NOTE - NS ED ATTENDING STATEMENT MOD
10/24/22 1:22 PM     See documentation in the VB [employer] SmartForm       Manuelito Majanoo
Attending Only

## 2023-02-21 NOTE — SWALLOW BEDSIDE ASSESSMENT ADULT - ASR SWALLOW ASPIRATION MONITOR
oral hygiene/throat clearing/upper respiratory infection/change of breathing pattern/position upright (90Y)/cough/fever/gurgly voice/pneumonia Stable

## 2023-06-05 NOTE — CONSULT NOTE ADULT - PROBLEM SELECTOR PROBLEM 2
Takes prn tramadol alternating with Tylenol. Patient ambulates with a walker. Fall precautions   Acute respiratory failure with hypoxia and hypercapnia

## 2023-10-11 NOTE — CONSULT NOTE ADULT - CONSULT REASON
Elevated Troponin Normal vision: sees adequately in most situations; can see medication labels, newsprint

## 2024-04-23 NOTE — CONSULT NOTE ADULT - CONSULT REASON
H&P reviewed. After examining the patient I find no changes in the patients condition since the H&P had been written.    Vitals:    04/23/24 0713   BP: 162/87   Pulse: 76   Resp: 18   Temp: 97.6 °F (36.4 °C)   SpO2: 100%      88F c/o "altered mental status and fever" to the ED. Code Sepsis was called. 88F c/o "altered mental status and fever" to the emergency room. Code Sepsis was initiated in the ED.     CT Scan shows "Subacute on chronic left frontoparietal temporal convexity subdural hemorrhage measuring up to 8 mm in maximal depth. No associated midline shift. Small focal more acute appearing right frontal convexity subdural hemorrhage measuring up to 5 mm in depth".     Neurosurgery was contacted by Dr. Dawkins for a neurosurgical consultation at 12:32AM. The patient was seen immediately and my attending neurosurgeon Dr. Jose Martin Disla was notified.